# Patient Record
Sex: MALE | Race: WHITE | URBAN - METROPOLITAN AREA
[De-identification: names, ages, dates, MRNs, and addresses within clinical notes are randomized per-mention and may not be internally consistent; named-entity substitution may affect disease eponyms.]

---

## 2023-02-21 DIAGNOSIS — Z12.5 PROSTATE CANCER SCREENING: Primary | ICD-10-CM

## 2023-03-31 ENCOUNTER — OFFICE VISIT (OUTPATIENT)
Dept: UROLOGY | Age: 73
End: 2023-03-31
Payer: MEDICARE

## 2023-03-31 VITALS
BODY MASS INDEX: 39.99 KG/M2 | WEIGHT: 270 LBS | TEMPERATURE: 97.4 F | SYSTOLIC BLOOD PRESSURE: 140 MMHG | DIASTOLIC BLOOD PRESSURE: 94 MMHG | HEIGHT: 69 IN | HEART RATE: 97 BPM

## 2023-03-31 DIAGNOSIS — Z12.5 PROSTATE CANCER SCREENING: ICD-10-CM

## 2023-03-31 DIAGNOSIS — R35.0 URINARY FREQUENCY: ICD-10-CM

## 2023-03-31 DIAGNOSIS — Z12.5 PROSTATE CANCER SCREENING: Primary | ICD-10-CM

## 2023-03-31 DIAGNOSIS — R97.20 ELEVATED PSA: ICD-10-CM

## 2023-03-31 PROCEDURE — 1036F TOBACCO NON-USER: CPT | Performed by: UROLOGY

## 2023-03-31 PROCEDURE — G8417 CALC BMI ABV UP PARAM F/U: HCPCS | Performed by: UROLOGY

## 2023-03-31 PROCEDURE — 1123F ACP DISCUSS/DSCN MKR DOCD: CPT | Performed by: UROLOGY

## 2023-03-31 PROCEDURE — G8427 DOCREV CUR MEDS BY ELIG CLIN: HCPCS | Performed by: UROLOGY

## 2023-03-31 PROCEDURE — 99214 OFFICE O/P EST MOD 30 MIN: CPT | Performed by: UROLOGY

## 2023-03-31 PROCEDURE — G8484 FLU IMMUNIZE NO ADMIN: HCPCS | Performed by: UROLOGY

## 2023-03-31 PROCEDURE — 3017F COLORECTAL CA SCREEN DOC REV: CPT | Performed by: UROLOGY

## 2023-03-31 RX ORDER — INSULIN GLARGINE 100 [IU]/ML
INJECTION, SOLUTION SUBCUTANEOUS NIGHTLY
COMMUNITY

## 2023-03-31 RX ORDER — ALFUZOSIN HYDROCHLORIDE 10 MG/1
10 TABLET, EXTENDED RELEASE ORAL DAILY
Qty: 30 TABLET | Refills: 3 | Status: SHIPPED | OUTPATIENT
Start: 2023-03-31

## 2023-03-31 ASSESSMENT — ENCOUNTER SYMPTOMS
SHORTNESS OF BREATH: 0
NAUSEA: 0
EYE REDNESS: 0
VOMITING: 0
COUGH: 0
BACK PAIN: 0
EYE PAIN: 0
ABDOMINAL PAIN: 0
CONSTIPATION: 0
DIARRHEA: 0
WHEEZING: 0

## 2023-03-31 NOTE — PROGRESS NOTES
Review of Systems   Constitutional:  Negative for chills, fatigue and fever. Eyes:  Negative for pain, redness and visual disturbance. Respiratory:  Negative for cough, shortness of breath and wheezing. Cardiovascular:  Negative for chest pain and leg swelling. Gastrointestinal:  Negative for abdominal pain, constipation, diarrhea, nausea and vomiting. Genitourinary:  Negative for difficulty urinating, dysuria, flank pain, frequency, hematuria, scrotal swelling, testicular pain and urgency. Musculoskeletal:  Negative for back pain, joint swelling and myalgias. Skin:  Negative for rash and wound. Neurological:  Negative for dizziness, tremors, weakness and numbness. Hematological:  Does not bruise/bleed easily.
PRESTAB) 3 MG tablet 3 mg      ONETOUCH VERIO strip USE 1 STRIP TO CHECK GLUCOSE ONCE DAILY      candesartan (ATACAND) 16 MG tablet TAKE 1 TABLET BY MOUTH ONCE DAILY         Ciprofloxacin and Sulfa antibiotics  Social History     Tobacco Use   Smoking Status Never   Smokeless Tobacco Never     (Ifpatient a smoker, smoking cessation counseling offered)    Social History     Substance and Sexual Activity   Alcohol Use None       REVIEW OF SYSTEMS:  Review of Systems    Physical Exam:      Vitals:    03/31/23 1117   BP: (!) 140/94   Pulse: 97   Temp: 97.4 °F (36.3 °C)     Body mass index is 39.87 kg/m². Patient is a 71 y.o. male in no acute distress and alert and oriented to person, place and time. Physical Exam  Constitutional: Patient in no acute distress. Neuro: Alert and oriented to person, place and time. Psych: Mood normal, affect normal  Skin: No rash noted    GUILLE 60 grams, no nodules. Assessment and Plan      1. Prostate cancer screening    2. Urinary frequency    3. Elevated PSA           Plan:         PSA pending. Frequency is likely related to DM. Start uroxatral.   Needs to restrict fluids before bed. PSA elevation is new. He does not want to pursue anything more at this time. Will repeat PSA in 6 months. Return in about 6 months (around 9/30/2023) for Labs. Prescriptions Ordered:  Orders Placed This Encounter   Medications    alfuzosin (UROXATRAL) 10 MG extended release tablet     Sig: Take 1 tablet by mouth daily     Dispense:  30 tablet     Refill:  3       Orders Placed:  No orders of the defined types were placed in this encounter. Stephane Armstrong MD    Agree with the ROS entered by the MA.

## 2023-03-31 NOTE — LETTER
[facility]  [address]  [facility] [address]  Dept: [phone]  Dept Fax: [phone]        3/31/23    Patient: Mojgan Price  YOB: 1950    Dear ALURA DIAS,    I had the pleasure of seeing one of your patients, MOJGAN PRICE today in the office today.  Below are the relevant portions of my assessment and plan of care.    IMPRESSION:  1. Prostate cancer screening    2. Urinary frequency    3. Elevated PSA        PLAN:  PSA pending.   Frequency is likely related to DM.   Needs to restrict fluids before bed.   PSA elevation is new.   He does not want to pursue anything more at this time.   Will repeat PSA in 6 months.   Return in about 6 months (around 9/30/2023) for Labs.    Prescriptions Ordered:  No orders of the defined types were placed in this encounter.    Orders Placed:  No orders of the defined types were placed in this encounter.       Thank you for allowing me to participate in the care of this patient.  I will keep you updated on this patient's follow up and I look forward to serving you and your patients again in the future.        Francisco Javier Palencia MD

## 2023-10-06 ENCOUNTER — OFFICE VISIT (OUTPATIENT)
Dept: UROLOGY | Age: 73
End: 2023-10-06
Payer: MEDICARE

## 2023-10-06 VITALS
SYSTOLIC BLOOD PRESSURE: 138 MMHG | HEIGHT: 69 IN | DIASTOLIC BLOOD PRESSURE: 81 MMHG | HEART RATE: 80 BPM | TEMPERATURE: 96.9 F | BODY MASS INDEX: 39.99 KG/M2 | WEIGHT: 270 LBS

## 2023-10-06 DIAGNOSIS — R35.0 URINARY FREQUENCY: ICD-10-CM

## 2023-10-06 DIAGNOSIS — Z12.5 PROSTATE CANCER SCREENING: Primary | ICD-10-CM

## 2023-10-06 PROCEDURE — G8484 FLU IMMUNIZE NO ADMIN: HCPCS | Performed by: UROLOGY

## 2023-10-06 PROCEDURE — G8427 DOCREV CUR MEDS BY ELIG CLIN: HCPCS | Performed by: UROLOGY

## 2023-10-06 PROCEDURE — G8417 CALC BMI ABV UP PARAM F/U: HCPCS | Performed by: UROLOGY

## 2023-10-06 PROCEDURE — 3017F COLORECTAL CA SCREEN DOC REV: CPT | Performed by: UROLOGY

## 2023-10-06 PROCEDURE — 99214 OFFICE O/P EST MOD 30 MIN: CPT | Performed by: UROLOGY

## 2023-10-06 PROCEDURE — 1036F TOBACCO NON-USER: CPT | Performed by: UROLOGY

## 2023-10-06 PROCEDURE — 1123F ACP DISCUSS/DSCN MKR DOCD: CPT | Performed by: UROLOGY

## 2023-10-06 RX ORDER — ALFUZOSIN HYDROCHLORIDE 10 MG/1
10 TABLET, EXTENDED RELEASE ORAL DAILY
Qty: 90 TABLET | Refills: 3 | Status: SHIPPED | OUTPATIENT
Start: 2023-10-06

## 2023-10-06 ASSESSMENT — ENCOUNTER SYMPTOMS
ABDOMINAL PAIN: 0
EYES NEGATIVE: 1
RESPIRATORY NEGATIVE: 1
WHEEZING: 0
NAUSEA: 0
EYE PAIN: 0
DIARRHEA: 0
CONSTIPATION: 0
GASTROINTESTINAL NEGATIVE: 1
SHORTNESS OF BREATH: 0
EYE REDNESS: 0
BACK PAIN: 0
VOMITING: 0
COUGH: 0

## 2023-10-06 NOTE — PROGRESS NOTES
Review of Systems   Constitutional: Negative. Negative for appetite change, chills and fatigue. Eyes: Negative. Negative for pain, redness and visual disturbance. Respiratory: Negative. Negative for cough, shortness of breath and wheezing. Cardiovascular: Negative. Negative for chest pain and leg swelling. Gastrointestinal: Negative. Negative for abdominal pain, constipation, diarrhea, nausea and vomiting. Genitourinary: Negative. Negative for difficulty urinating, dysuria, flank pain, frequency, hematuria and urgency. Musculoskeletal: Negative. Negative for back pain, joint swelling and myalgias. Skin: Negative. Negative for rash and wound. Neurological: Negative. Negative for dizziness, weakness and numbness. Hematological:  Does not bruise/bleed easily.

## 2023-10-06 NOTE — PROGRESS NOTES
[address],[place] [address] [facility] [address]  Dept: [address] Urology Office Note - Established    Patient:  Jeevan Mccullough  YOB: 1950  Date: 10/6/2023    The patient is a 68 y.o. male who presents todayfor evaluation of the following problems:   Chief Complaint   Patient presents with    6 Month Follow-Up       HPI  He is here in follow up for prostate cancer screening. PSA was 4.73 and is down to 2. He has some urgency   He is diabetic. Hgb A1c is 10. He was on Alfuzosin, but he is not taking it currently. Summary of old records: N/A    Additional History: N/A    Procedures Today: N/A    Urinalysis today:  No results found for this visit on 10/06/23. Last several PSA's:  No results found for: \"PSA\"  Last total testosterone:  No results found for: \"TESTOSTERONE\"    AUA Symptom Score (10/6/2023): Last BUN and creatinine:  No results found for: \"BUN\"  No results found for: \"CREATININE\"    Additional Lab/Culture results: none    Imaging Reviewed during this Office Visit: none  (results were independently reviewed by physician and radiology report verified)    PAST MEDICAL, [place]:  No past medical history on file. No past surgical history on file. No family history on file.   Outpatient Medications Marked as Taking for the 10/6/23 encounter (Office Visit) with Reynold Curtis MD   Medication Sig Dispense Refill    alfuzosin (UROXATRAL) 10 MG extended release tablet Take 1 tablet by mouth daily 90 tablet 3    insulin glargine (LANTUS) 100 UNIT/ML injection vial Inject into the skin nightly      alfuzosin (UROXATRAL) 10 MG extended release tablet Take 1 tablet by mouth daily 30 tablet 3    glyBURIDE micronized (GLYNASE PRESTAB) 3 MG tablet 1 tablet      ONETOUCH VERIO strip USE 1 STRIP TO CHECK GLUCOSE ONCE DAILY      candesartan

## 2024-06-20 ENCOUNTER — APPOINTMENT (OUTPATIENT)
Dept: GENERAL RADIOLOGY | Age: 74
DRG: 064 | End: 2024-06-20
Payer: MEDICARE

## 2024-06-20 ENCOUNTER — APPOINTMENT (OUTPATIENT)
Dept: MRI IMAGING | Age: 74
DRG: 064 | End: 2024-06-20
Payer: MEDICARE

## 2024-06-20 ENCOUNTER — HOSPITAL ENCOUNTER (INPATIENT)
Age: 74
LOS: 20 days | Discharge: NURSING, REHAB OR LONG-TERM CARE | DRG: 064 | End: 2024-07-10
Attending: EMERGENCY MEDICINE | Admitting: PSYCHIATRY & NEUROLOGY
Payer: MEDICARE

## 2024-06-20 ENCOUNTER — APPOINTMENT (OUTPATIENT)
Dept: CT IMAGING | Age: 74
DRG: 064 | End: 2024-06-20
Payer: MEDICARE

## 2024-06-20 DIAGNOSIS — G93.40 ENCEPHALOPATHY: ICD-10-CM

## 2024-06-20 DIAGNOSIS — R57.9 SHOCK (HCC): ICD-10-CM

## 2024-06-20 DIAGNOSIS — J18.9 PNEUMONIA DUE TO INFECTIOUS ORGANISM, UNSPECIFIED LATERALITY, UNSPECIFIED PART OF LUNG: ICD-10-CM

## 2024-06-20 DIAGNOSIS — M62.82 NON-TRAUMATIC RHABDOMYOLYSIS: ICD-10-CM

## 2024-06-20 DIAGNOSIS — R13.10 DYSPHAGIA, UNSPECIFIED TYPE: ICD-10-CM

## 2024-06-20 DIAGNOSIS — I63.512 ACUTE ISCHEMIC LEFT MCA STROKE (HCC): Primary | ICD-10-CM

## 2024-06-20 PROBLEM — I63.9 ISCHEMIC STROKE (HCC): Status: ACTIVE | Noted: 2024-06-20

## 2024-06-20 LAB
ALBUMIN SERPL-MCNC: 3.8 G/DL (ref 3.5–5.2)
ALBUMIN/GLOB SERPL: 1 {RATIO} (ref 1–2.5)
ALLEN TEST: POSITIVE
ALP SERPL-CCNC: 69 U/L (ref 40–129)
ALT SERPL-CCNC: 80 U/L (ref 10–50)
ANION GAP SERPL CALCULATED.3IONS-SCNC: 13 MMOL/L (ref 9–16)
APAP SERPL-MCNC: <5 UG/ML (ref 10–30)
AST SERPL-CCNC: 444 U/L (ref 10–50)
B-OH-BUTYR SERPL-MCNC: 0.65 MMOL/L (ref 0.02–0.27)
BACTERIA URNS QL MICRO: ABNORMAL
BASOPHILS # BLD: 0 K/UL (ref 0–0.2)
BASOPHILS NFR BLD: 0 % (ref 0–2)
BILIRUB SERPL-MCNC: 1.3 MG/DL (ref 0–1.2)
BILIRUB UR QL STRIP: ABNORMAL
BODY TEMPERATURE: 37
BUN BLD-MCNC: 30 MG/DL (ref 8–26)
BUN SERPL-MCNC: 27 MG/DL (ref 8–23)
CA-I BLD-SCNC: 1.14 MMOL/L (ref 1.13–1.33)
CA-I BLD-SCNC: 1.15 MMOL/L (ref 1.15–1.33)
CALCIUM SERPL-MCNC: 8.5 MG/DL (ref 8.6–10.4)
CASTS #/AREA URNS LPF: ABNORMAL /LPF (ref 0–8)
CHLORIDE BLD-SCNC: 101 MMOL/L (ref 98–107)
CHLORIDE SERPL-SCNC: 97 MMOL/L (ref 98–107)
CK SERPL-CCNC: ABNORMAL U/L (ref 39–308)
CLARITY UR: ABNORMAL
CO2 BLD CALC-SCNC: 23 MMOL/L (ref 22–30)
CO2 SERPL-SCNC: 21 MMOL/L (ref 20–31)
COHGB MFR BLD: 3 % (ref 0–5)
COLOR UR: ABNORMAL
CREAT SERPL-MCNC: 1.6 MG/DL (ref 0.7–1.2)
EGFR, POC: 53 ML/MIN/1.73M2
EOSINOPHIL # BLD: 0 K/UL (ref 0–0.44)
EOSINOPHILS RELATIVE PERCENT: 0 % (ref 1–4)
EPI CELLS #/AREA URNS HPF: ABNORMAL /HPF (ref 0–5)
ERYTHROCYTE [DISTWIDTH] IN BLOOD BY AUTOMATED COUNT: 14.5 % (ref 11.8–14.4)
ETHANOL PERCENT: <0.01 %
ETHANOLAMINE SERPL-MCNC: <10 MG/DL (ref 0–0.08)
FIO2 ON VENT: ABNORMAL %
FIO2: 100
GFR, ESTIMATED: 45 ML/MIN/1.73M2
GLUCOSE BLD-MCNC: 283 MG/DL (ref 75–110)
GLUCOSE BLD-MCNC: 366 MG/DL (ref 74–100)
GLUCOSE BLD-MCNC: 421 MG/DL (ref 74–100)
GLUCOSE SERPL-MCNC: 389 MG/DL (ref 74–99)
GLUCOSE UR STRIP-MCNC: ABNORMAL MG/DL
HCO3 VENOUS: 21.2 MMOL/L (ref 24–30)
HCO3 VENOUS: 22.9 MMOL/L (ref 22–29)
HCT VFR BLD AUTO: 46.7 % (ref 40.7–50.3)
HCT VFR BLD AUTO: 50 % (ref 41–53)
HGB BLD-MCNC: 15.1 G/DL (ref 13–17)
HGB UR QL STRIP.AUTO: ABNORMAL
IMM GRANULOCYTES # BLD AUTO: 0.21 K/UL (ref 0–0.3)
IMM GRANULOCYTES NFR BLD: 1 %
INR PPP: 1.3
KETONES UR STRIP-MCNC: ABNORMAL MG/DL
LACTIC ACID, SEPSIS WHOLE BLOOD: 3.6 MMOL/L (ref 0.5–1.9)
LACTIC ACID, SEPSIS WHOLE BLOOD: 4.7 MMOL/L (ref 0.5–1.9)
LEUKOCYTE ESTERASE UR QL STRIP: ABNORMAL
LYMPHOCYTES NFR BLD: 1.44 K/UL (ref 1.1–3.7)
LYMPHOCYTES RELATIVE PERCENT: 7 % (ref 24–43)
MAGNESIUM SERPL-MCNC: 2.2 MG/DL (ref 1.6–2.4)
MCH RBC QN AUTO: 30 PG (ref 25.2–33.5)
MCHC RBC AUTO-ENTMCNC: 32.3 G/DL (ref 28.4–34.8)
MCV RBC AUTO: 92.7 FL (ref 82.6–102.9)
MODE: ABNORMAL
MONOCYTES NFR BLD: 1.85 K/UL (ref 0.1–1.2)
MONOCYTES NFR BLD: 9 % (ref 3–12)
MORPHOLOGY: ABNORMAL
NEGATIVE BASE EXCESS, ART: 6.3 MMOL/L (ref 0–2)
NEGATIVE BASE EXCESS, VEN: 4.7 MMOL/L (ref 0–2)
NEGATIVE BASE EXCESS, VEN: 6.7 MMOL/L (ref 0–2)
NEUTROPHILS NFR BLD: 83 % (ref 36–65)
NEUTS SEG NFR BLD: 17.1 K/UL (ref 1.5–8.1)
NITRITE UR QL STRIP: NEGATIVE
NRBC BLD-RTO: 0 PER 100 WBC
O2 DELIVERY DEVICE: ABNORMAL
O2 SAT, VEN: 34.7 % (ref 60–85)
O2 SAT, VEN: 65.7 % (ref 60–85)
PARTIAL THROMBOPLASTIN TIME: 24.2 SEC (ref 23–36.5)
PCO2 VENOUS: 50.1 MM HG (ref 41–51)
PCO2 VENOUS: 52.4 MM HG (ref 39–55)
PH UR STRIP: 5.5 [PH] (ref 5–8)
PH VENOUS: 7.23 (ref 7.32–7.42)
PH VENOUS: 7.27 (ref 7.32–7.43)
PLATELET # BLD AUTO: 238 K/UL (ref 138–453)
PMV BLD AUTO: 12.4 FL (ref 8.1–13.5)
PO2 VENOUS: 24.2 MM HG (ref 30–50)
PO2 VENOUS: 42.2 MM HG (ref 30–50)
POC ANION GAP: 12 MMOL/L (ref 7–16)
POC CREATININE: 1.4 MG/DL (ref 0.51–1.19)
POC HCO3: 19.4 MMOL/L (ref 21–28)
POC HEMOGLOBIN (CALC): 16.9 G/DL (ref 13.5–17.5)
POC LACTIC ACID: 2 MMOL/L (ref 0.56–1.39)
POC LACTIC ACID: 4.5 MMOL/L (ref 0.56–1.39)
POC O2 SATURATION: 99.7 % (ref 94–98)
POC PCO2: 38.2 MM HG (ref 35–48)
POC PH: 7.31 (ref 7.35–7.45)
POC PO2: 223.3 MM HG (ref 83–108)
POTASSIUM BLD-SCNC: 5 MMOL/L (ref 3.5–4.5)
POTASSIUM SERPL-SCNC: 4.8 MMOL/L (ref 3.7–5.3)
PROT SERPL-MCNC: 7.6 G/DL (ref 6.6–8.7)
PROT UR STRIP-MCNC: ABNORMAL MG/DL
PROTHROMBIN TIME: 16.3 SEC (ref 11.7–14.9)
RBC # BLD AUTO: 5.04 M/UL (ref 4.21–5.77)
RBC #/AREA URNS HPF: ABNORMAL /HPF (ref 0–4)
SALICYLATES SERPL-MCNC: <0.5 MG/DL (ref 0–10)
SAMPLE SITE: ABNORMAL
SODIUM BLD-SCNC: 135 MMOL/L (ref 138–146)
SODIUM SERPL-SCNC: 131 MMOL/L (ref 136–145)
SP GR UR STRIP: 1.03 (ref 1–1.03)
TROPONIN I SERPL HS-MCNC: 72 NG/L (ref 0–22)
TROPONIN I SERPL HS-MCNC: 79 NG/L (ref 0–22)
UROBILINOGEN UR STRIP-ACNC: NORMAL EU/DL (ref 0–1)
WBC #/AREA URNS HPF: ABNORMAL /HPF (ref 0–5)
WBC OTHER # BLD: 20.6 K/UL (ref 3.5–11.3)

## 2024-06-20 PROCEDURE — 80053 COMPREHEN METABOLIC PANEL: CPT

## 2024-06-20 PROCEDURE — 36415 COLL VENOUS BLD VENIPUNCTURE: CPT

## 2024-06-20 PROCEDURE — 70551 MRI BRAIN STEM W/O DYE: CPT

## 2024-06-20 PROCEDURE — 99447 NTRPROF PH1/NTRNET/EHR 11-20: CPT | Performed by: PSYCHIATRY & NEUROLOGY

## 2024-06-20 PROCEDURE — 93005 ELECTROCARDIOGRAM TRACING: CPT | Performed by: REGISTERED NURSE

## 2024-06-20 PROCEDURE — 94761 N-INVAS EAR/PLS OXIMETRY MLT: CPT

## 2024-06-20 PROCEDURE — 0042T CT BRAIN PERFUSION: CPT

## 2024-06-20 PROCEDURE — 85025 COMPLETE CBC W/AUTO DIFF WBC: CPT

## 2024-06-20 PROCEDURE — 84484 ASSAY OF TROPONIN QUANT: CPT

## 2024-06-20 PROCEDURE — 83735 ASSAY OF MAGNESIUM: CPT

## 2024-06-20 PROCEDURE — 70450 CT HEAD/BRAIN W/O DYE: CPT

## 2024-06-20 PROCEDURE — 2500000003 HC RX 250 WO HCPCS: Performed by: STUDENT IN AN ORGANIZED HEALTH CARE EDUCATION/TRAINING PROGRAM

## 2024-06-20 PROCEDURE — 80051 ELECTROLYTE PANEL: CPT

## 2024-06-20 PROCEDURE — 71045 X-RAY EXAM CHEST 1 VIEW: CPT

## 2024-06-20 PROCEDURE — G0480 DRUG TEST DEF 1-7 CLASSES: HCPCS

## 2024-06-20 PROCEDURE — 87040 BLOOD CULTURE FOR BACTERIA: CPT

## 2024-06-20 PROCEDURE — 6370000000 HC RX 637 (ALT 250 FOR IP): Performed by: REGISTERED NURSE

## 2024-06-20 PROCEDURE — 70498 CT ANGIOGRAPHY NECK: CPT

## 2024-06-20 PROCEDURE — 87086 URINE CULTURE/COLONY COUNT: CPT

## 2024-06-20 PROCEDURE — 6360000002 HC RX W HCPCS: Performed by: STUDENT IN AN ORGANIZED HEALTH CARE EDUCATION/TRAINING PROGRAM

## 2024-06-20 PROCEDURE — 82803 BLOOD GASES ANY COMBINATION: CPT

## 2024-06-20 PROCEDURE — 2000000000 HC ICU R&B

## 2024-06-20 PROCEDURE — 99285 EMERGENCY DEPT VISIT HI MDM: CPT

## 2024-06-20 PROCEDURE — 2580000003 HC RX 258: Performed by: STUDENT IN AN ORGANIZED HEALTH CARE EDUCATION/TRAINING PROGRAM

## 2024-06-20 PROCEDURE — 36600 WITHDRAWAL OF ARTERIAL BLOOD: CPT

## 2024-06-20 PROCEDURE — 2500000003 HC RX 250 WO HCPCS: Performed by: REGISTERED NURSE

## 2024-06-20 PROCEDURE — 87205 SMEAR GRAM STAIN: CPT

## 2024-06-20 PROCEDURE — 94002 VENT MGMT INPAT INIT DAY: CPT

## 2024-06-20 PROCEDURE — 6370000000 HC RX 637 (ALT 250 FOR IP): Performed by: STUDENT IN AN ORGANIZED HEALTH CARE EDUCATION/TRAINING PROGRAM

## 2024-06-20 PROCEDURE — 80143 DRUG ASSAY ACETAMINOPHEN: CPT

## 2024-06-20 PROCEDURE — 2580000003 HC RX 258: Performed by: REGISTERED NURSE

## 2024-06-20 PROCEDURE — 82330 ASSAY OF CALCIUM: CPT

## 2024-06-20 PROCEDURE — 72125 CT NECK SPINE W/O DYE: CPT

## 2024-06-20 PROCEDURE — 85014 HEMATOCRIT: CPT

## 2024-06-20 PROCEDURE — 6360000004 HC RX CONTRAST MEDICATION: Performed by: STUDENT IN AN ORGANIZED HEALTH CARE EDUCATION/TRAINING PROGRAM

## 2024-06-20 PROCEDURE — 80179 DRUG ASSAY SALICYLATE: CPT

## 2024-06-20 PROCEDURE — 85610 PROTHROMBIN TIME: CPT

## 2024-06-20 PROCEDURE — 85730 THROMBOPLASTIN TIME PARTIAL: CPT

## 2024-06-20 PROCEDURE — 82805 BLOOD GASES W/O2 SATURATION: CPT

## 2024-06-20 PROCEDURE — 82550 ASSAY OF CK (CPK): CPT

## 2024-06-20 PROCEDURE — 82947 ASSAY GLUCOSE BLOOD QUANT: CPT

## 2024-06-20 PROCEDURE — 74177 CT ABD & PELVIS W/CONTRAST: CPT

## 2024-06-20 PROCEDURE — 82565 ASSAY OF CREATININE: CPT

## 2024-06-20 PROCEDURE — 83605 ASSAY OF LACTIC ACID: CPT

## 2024-06-20 PROCEDURE — 96375 TX/PRO/DX INJ NEW DRUG ADDON: CPT

## 2024-06-20 PROCEDURE — 84520 ASSAY OF UREA NITROGEN: CPT

## 2024-06-20 PROCEDURE — 87154 CUL TYP ID BLD PTHGN 6+ TRGT: CPT

## 2024-06-20 PROCEDURE — 82010 KETONE BODYS QUAN: CPT

## 2024-06-20 PROCEDURE — 6360000002 HC RX W HCPCS: Performed by: REGISTERED NURSE

## 2024-06-20 PROCEDURE — 2700000000 HC OXYGEN THERAPY PER DAY

## 2024-06-20 PROCEDURE — 5A1955Z RESPIRATORY VENTILATION, GREATER THAN 96 CONSECUTIVE HOURS: ICD-10-PCS | Performed by: STUDENT IN AN ORGANIZED HEALTH CARE EDUCATION/TRAINING PROGRAM

## 2024-06-20 PROCEDURE — 96365 THER/PROPH/DIAG IV INF INIT: CPT

## 2024-06-20 PROCEDURE — 81001 URINALYSIS AUTO W/SCOPE: CPT

## 2024-06-20 RX ORDER — INSULIN LISPRO 100 [IU]/ML
0-4 INJECTION, SOLUTION INTRAVENOUS; SUBCUTANEOUS NIGHTLY
Status: DISCONTINUED | OUTPATIENT
Start: 2024-06-20 | End: 2024-06-21

## 2024-06-20 RX ORDER — SODIUM CHLORIDE 0.9 % (FLUSH) 0.9 %
5-40 SYRINGE (ML) INJECTION EVERY 12 HOURS SCHEDULED
Status: DISCONTINUED | OUTPATIENT
Start: 2024-06-20 | End: 2024-07-10 | Stop reason: HOSPADM

## 2024-06-20 RX ORDER — ASPIRIN 325 MG
325 TABLET ORAL ONCE
Status: COMPLETED | OUTPATIENT
Start: 2024-06-20 | End: 2024-06-20

## 2024-06-20 RX ORDER — PHENYLEPHRINE HCL IN 0.9% NACL 50MG/250ML
10-300 PLASTIC BAG, INJECTION (ML) INTRAVENOUS CONTINUOUS
Status: DISCONTINUED | OUTPATIENT
Start: 2024-06-20 | End: 2024-06-21

## 2024-06-20 RX ORDER — ATORVASTATIN CALCIUM 80 MG/1
80 TABLET, FILM COATED ORAL NIGHTLY
Status: DISCONTINUED | OUTPATIENT
Start: 2024-06-20 | End: 2024-06-23

## 2024-06-20 RX ORDER — INSULIN LISPRO 100 [IU]/ML
0-8 INJECTION, SOLUTION INTRAVENOUS; SUBCUTANEOUS
Status: DISCONTINUED | OUTPATIENT
Start: 2024-06-21 | End: 2024-06-21

## 2024-06-20 RX ORDER — SODIUM CHLORIDE 9 MG/ML
INJECTION, SOLUTION INTRAVENOUS CONTINUOUS
Status: DISCONTINUED | OUTPATIENT
Start: 2024-06-20 | End: 2024-07-01

## 2024-06-20 RX ORDER — FENTANYL CITRATE-0.9 % NACL/PF 10 MCG/ML
25-200 PLASTIC BAG, INJECTION (ML) INTRAVENOUS CONTINUOUS
Status: DISCONTINUED | OUTPATIENT
Start: 2024-06-20 | End: 2024-06-20

## 2024-06-20 RX ORDER — ONDANSETRON 4 MG/1
4 TABLET, ORALLY DISINTEGRATING ORAL EVERY 8 HOURS PRN
Status: DISCONTINUED | OUTPATIENT
Start: 2024-06-20 | End: 2024-07-10 | Stop reason: HOSPADM

## 2024-06-20 RX ORDER — LABETALOL HYDROCHLORIDE 5 MG/ML
10 INJECTION, SOLUTION INTRAVENOUS EVERY 10 MIN PRN
Status: DISCONTINUED | OUTPATIENT
Start: 2024-06-20 | End: 2024-06-24

## 2024-06-20 RX ORDER — SODIUM CHLORIDE 9 MG/ML
INJECTION, SOLUTION INTRAVENOUS PRN
Status: DISCONTINUED | OUTPATIENT
Start: 2024-06-20 | End: 2024-07-06

## 2024-06-20 RX ORDER — FENTANYL CITRATE-0.9 % NACL/PF 20 MCG/2ML
50 SYRINGE (ML) INTRAVENOUS EVERY 30 MIN PRN
Status: DISCONTINUED | OUTPATIENT
Start: 2024-06-20 | End: 2024-06-21

## 2024-06-20 RX ORDER — SODIUM CHLORIDE 0.9 % (FLUSH) 0.9 %
5-40 SYRINGE (ML) INJECTION PRN
Status: DISCONTINUED | OUTPATIENT
Start: 2024-06-20 | End: 2024-07-10 | Stop reason: HOSPADM

## 2024-06-20 RX ORDER — ASPIRIN 300 MG/1
300 SUPPOSITORY RECTAL DAILY
Status: DISCONTINUED | OUTPATIENT
Start: 2024-06-21 | End: 2024-06-21

## 2024-06-20 RX ORDER — POLYETHYLENE GLYCOL 3350 17 G/17G
17 POWDER, FOR SOLUTION ORAL DAILY PRN
Status: DISCONTINUED | OUTPATIENT
Start: 2024-06-20 | End: 2024-06-21

## 2024-06-20 RX ORDER — ASPIRIN 81 MG/1
81 TABLET, CHEWABLE ORAL DAILY
Status: DISCONTINUED | OUTPATIENT
Start: 2024-06-21 | End: 2024-06-21

## 2024-06-20 RX ORDER — 0.9 % SODIUM CHLORIDE 0.9 %
30 INTRAVENOUS SOLUTION INTRAVENOUS ONCE
Status: COMPLETED | OUTPATIENT
Start: 2024-06-20 | End: 2024-06-20

## 2024-06-20 RX ORDER — ONDANSETRON 2 MG/ML
4 INJECTION INTRAMUSCULAR; INTRAVENOUS EVERY 6 HOURS PRN
Status: DISCONTINUED | OUTPATIENT
Start: 2024-06-20 | End: 2024-07-10 | Stop reason: HOSPADM

## 2024-06-20 RX ORDER — CHLORHEXIDINE GLUCONATE ORAL RINSE 1.2 MG/ML
15 SOLUTION DENTAL 2 TIMES DAILY
Status: DISCONTINUED | OUTPATIENT
Start: 2024-06-20 | End: 2024-07-01

## 2024-06-20 RX ORDER — PROPOFOL 10 MG/ML
5-50 INJECTION, EMULSION INTRAVENOUS CONTINUOUS
Status: DISCONTINUED | OUTPATIENT
Start: 2024-06-20 | End: 2024-06-22

## 2024-06-20 RX ADMIN — VANCOMYCIN HYDROCHLORIDE 1750 MG: 10 INJECTION, POWDER, LYOPHILIZED, FOR SOLUTION INTRAVENOUS at 16:38

## 2024-06-20 RX ADMIN — IOPAMIDOL 200 ML: 755 INJECTION, SOLUTION INTRAVENOUS at 14:43

## 2024-06-20 RX ADMIN — SODIUM CHLORIDE: 9 INJECTION, SOLUTION INTRAVENOUS at 16:08

## 2024-06-20 RX ADMIN — CHLORHEXIDINE GLUCONATE 0.12% ORAL RINSE 15 ML: 1.2 LIQUID ORAL at 22:03

## 2024-06-20 RX ADMIN — Medication 50 MCG/HR: at 21:59

## 2024-06-20 RX ADMIN — ASPIRIN 325 MG: 325 TABLET ORAL at 22:48

## 2024-06-20 RX ADMIN — FAMOTIDINE 20 MG: 10 INJECTION, SOLUTION INTRAVENOUS at 22:03

## 2024-06-20 RX ADMIN — PHENYLEPHRINE HYDROCHLORIDE 10 MCG/MIN: 10 INJECTION INTRAVENOUS at 22:17

## 2024-06-20 RX ADMIN — PROPOFOL 15 MCG/KG/MIN: 10 INJECTION, EMULSION INTRAVENOUS at 20:47

## 2024-06-20 RX ADMIN — SODIUM CHLORIDE 2121 ML: 9 INJECTION, SOLUTION INTRAVENOUS at 14:49

## 2024-06-20 RX ADMIN — SODIUM CHLORIDE: 9 INJECTION, SOLUTION INTRAVENOUS at 21:32

## 2024-06-20 RX ADMIN — PIPERACILLIN AND TAZOBACTAM 3375 MG: 3; .375 INJECTION, POWDER, LYOPHILIZED, FOR SOLUTION INTRAVENOUS at 15:43

## 2024-06-20 RX ADMIN — ATORVASTATIN CALCIUM 80 MG: 80 TABLET, FILM COATED ORAL at 22:03

## 2024-06-20 RX ADMIN — SODIUM CHLORIDE, PRESERVATIVE FREE 10 ML: 5 INJECTION INTRAVENOUS at 22:07

## 2024-06-20 RX ADMIN — PROPOFOL 20 MCG/KG/MIN: 10 INJECTION, EMULSION INTRAVENOUS at 15:25

## 2024-06-20 ASSESSMENT — PULMONARY FUNCTION TESTS
PIF_VALUE: 24
PIF_VALUE: 17
PIF_VALUE: 14

## 2024-06-20 NOTE — ED NOTES
Attempted to give report to Neuro ICU and was told by Aydee that there was no RN to take the patient and it would have to be night shift that takes report.

## 2024-06-20 NOTE — ED NOTES
Pt presents to ED as a transfer from [facility] for difficulty breathing.  Per EMS, pt's neighbor was concerned because he did not see the pt so she called EMS to check on pt.  EMS found pt in bed with snoring respirations and unresponsive.  Pt was intubated on scene and brought to [facility].  [facility] completed a CT scan that showed pt has left infarct. Pt also found to have rhabdomyolysis and lactic acidosis.  Upon arrival pt in a-fib.  Pt received 2 doses of fentanyl an unknown dose of Ketamine, and was started on a levo drip due to hypotension.

## 2024-06-20 NOTE — ED NOTES
Labeled blood specimens sent to lab via tube system.    [x] Lavender   [] on ice   [x] Blue   [x] Green/yellow  [x] Green/black [] on ice  [] Pink  [] Red  [] Yellow  [] on ice  [x] Blood Cultures  [] x1 [x] x2    Labeled urine specimen sent to lab via tube system.    Urine Sample  []  Clean catch  []  Straight cath  []  Urine voided  [x]  Indwelling catheter  []  Suprapubic catheter

## 2024-06-20 NOTE — ED PROVIDER NOTES
[facility] ED     Emergency Department     Faculty Attestation        I performed a history and physical examination of the patient and discussed management with the resident. I reviewed the resident’s note and agree with the documented findings and plan of care. Any areas of disagreement are noted on the chart. I was personally present for the key portions of any procedures. I have documented in the chart those procedures where I was not present during the key portions. I have reviewed the emergency nurses triage note. I agree with the chief complaint, past medical history, past surgical history, allergies, medications, social and family history as documented unless otherwise noted below.  For Physician Assistant/ Nurse Practitioner cases/documentation I have personally evaluated this patient and have completed at least one if not all key elements of the E/M (history, physical exam, and MDM). Additional findings are as noted.        PCP:  Kevyn Castillo MD  Note Started: 6/20/24, 1:55 PM EDT    Pertinent Comments:     Patient is a 73-year-old male transferred from [facility] with last seen normal at least 48 hours ago found on his floor with what appears to be rhabdomyolysis as well as not protecting his airway and altered requiring intubation prior to arrival.   CT head without contrast already shows an infarct in the left MCA territory.    Neuroendovascular was already involved and wished CT perfusion here.    Patient is intermittently in A-fib RVR as well.    Was requiring some Levophed just prior to arrival by [facility].    Awaiting stroke alert as well as MICU admission      CRITICAL CARE: There was a high probability of clinically significant/life threatening deterioration in this patient's condition which required my urgent intervention.  Total critical care time was 30 minutes.  This excludes any time for separately reportable procedures. 
        [facility] ED  Emergency Department  Emergency Medicine Resident Turn-Over     Note Started: 3:08 PM EDT    Care of Timothy Arellano was assumed from Dr. Richardson and is being seen for Respiratory Distress  .  The patient's initial evaluation and plan have been discussed with the prior provider who initially evaluated the patient.     EMERGENCY DEPARTMENT COURSE / MEDICAL DECISION MAKING:       MEDICATIONS GIVEN:  Orders Placed This Encounter   Medications    iopamidol (ISOVUE-370) 76 % injection 200 mL    sodium chloride 0.9 % bolus 2,121 mL    propofol infusion     Order Specific Question:   Titrate Infusion?     Answer:   Yes     Order Specific Question:   Initial Infusion Dose:     Answer:   20 mcg/kg/min     Order Specific Question:   Goal of Therapy:     Answer:   RASS of 0 to -1     Order Specific Question:   Contact Provider if:     Answer:   New onset HR less than 50 bpm     Order Specific Question:   Contact Provider if:     Answer:   New onset SBP less than 90 mmHg     Order Specific Question:   Contact Provider if:     Answer:   Patient is receiving maximum dose and is not achieving the goal of therapy     Order Specific Question:   Contact Provider if:     Answer:   Triglycerides greater than 500 mg/dL    vancomycin (VANCOCIN) 1750 mg in sodium chloride 0.9 % 500 mL IVPB     Order Specific Question:   Antimicrobial Indications     Answer:   Sepsis of Unknown Etiology     Order Specific Question:   Sepsis duration of therapy     Answer:   7 days    piperacillin-tazobactam (ZOSYN) 3,375 mg in sodium chloride 0.9 % 50 mL IVPB (mini-bag)     Order Specific Question:   Antimicrobial Indications     Answer:   Sepsis of Unknown Etiology     Order Specific Question:   Sepsis duration of therapy     Answer:   7 days    0.9 % sodium chloride infusion    sodium chloride flush 0.9 % injection 5-40 mL    sodium chloride flush 0.9 % injection 5-40 mL    0.9 % sodium chloride infusion    OR Linked 
[facility]  FACULTY HANDOFF       Handoff taken on the following patient from prior Attending Physician:  Pt Name: Enricocurtis Banksy  PCP:  Kevyn Castillo MD    Attestation  I was available and discussed any additional care issues that arose and coordinated the management plans with the resident(s) caring for the patient during my duty period. Any areas of disagreement with resident's documentation of care or procedures are noted on the chart. I was personally present for the key portions of any/all procedures during my duty period. I have documented in the chart those procedures where I was not present during the key portions.           Adam Wise MD  06/20/24 9881    
were made to edit the dictations but occasionally words are mis-transcribed.)

## 2024-06-21 ENCOUNTER — APPOINTMENT (OUTPATIENT)
Dept: GENERAL RADIOLOGY | Age: 74
DRG: 064 | End: 2024-06-21
Payer: MEDICARE

## 2024-06-21 PROBLEM — M62.82 NON-TRAUMATIC RHABDOMYOLYSIS: Status: ACTIVE | Noted: 2024-06-21

## 2024-06-21 PROBLEM — J18.9 PNEUMONIA DUE TO INFECTIOUS ORGANISM: Status: ACTIVE | Noted: 2024-06-21

## 2024-06-21 PROBLEM — I63.512 ACUTE ISCHEMIC LEFT MCA STROKE (HCC): Status: ACTIVE | Noted: 2024-06-20

## 2024-06-21 PROBLEM — N17.9 AKI (ACUTE KIDNEY INJURY) (HCC): Status: ACTIVE | Noted: 2024-06-21

## 2024-06-21 PROBLEM — R78.81 BACTEREMIA: Status: ACTIVE | Noted: 2024-06-21

## 2024-06-21 PROBLEM — R57.9 SHOCK (HCC): Status: ACTIVE | Noted: 2024-06-21

## 2024-06-21 LAB
ALLEN TEST: ABNORMAL
AMMONIA PLAS-SCNC: 39 UMOL/L (ref 16–60)
ANION GAP SERPL CALCULATED.3IONS-SCNC: 10 MMOL/L (ref 9–16)
ANION GAP SERPL CALCULATED.3IONS-SCNC: 11 MMOL/L (ref 9–16)
ANION GAP SERPL CALCULATED.3IONS-SCNC: 9 MMOL/L (ref 9–16)
ANTI-XA UNFRAC HEPARIN: 0.1 IU/L
ANTI-XA UNFRAC HEPARIN: 0.15 IU/L
ANTI-XA UNFRAC HEPARIN: <0.1 IU/L
B PARAP IS1001 DNA NPH QL NAA+NON-PROBE: NOT DETECTED
B PERT DNA SPEC QL NAA+PROBE: NOT DETECTED
BNP SERPL-MCNC: 7912 PG/ML (ref 0–300)
BUN SERPL-MCNC: 26 MG/DL (ref 8–23)
C PNEUM DNA NPH QL NAA+NON-PROBE: NOT DETECTED
CALCIUM SERPL-MCNC: 7.6 MG/DL (ref 8.6–10.4)
CHLORIDE SERPL-SCNC: 107 MMOL/L (ref 98–107)
CHLORIDE SERPL-SCNC: 109 MMOL/L (ref 98–107)
CHLORIDE SERPL-SCNC: 110 MMOL/L (ref 98–107)
CHLORIDE SERPL-SCNC: 111 MMOL/L (ref 98–107)
CHLORIDE SERPL-SCNC: 111 MMOL/L (ref 98–107)
CHOLEST SERPL-MCNC: 126 MG/DL (ref 0–199)
CHOLESTEROL/HDL RATIO: 4
CK SERPL-CCNC: ABNORMAL U/L (ref 39–308)
CO2 SERPL-SCNC: 18 MMOL/L (ref 20–31)
CO2 SERPL-SCNC: 19 MMOL/L (ref 20–31)
CO2 SERPL-SCNC: 20 MMOL/L (ref 20–31)
CREAT SERPL-MCNC: 1.3 MG/DL (ref 0.7–1.2)
EKG ATRIAL RATE: 120 BPM
EKG Q-T INTERVAL: 268 MS
EKG QRS DURATION: 102 MS
EKG QTC CALCULATION (BAZETT): 383 MS
EKG R AXIS: 16 DEGREES
EKG T AXIS: 30 DEGREES
EKG VENTRICULAR RATE: 123 BPM
ERYTHROCYTE [DISTWIDTH] IN BLOOD BY AUTOMATED COUNT: 14.8 % (ref 11.8–14.4)
ERYTHROCYTE [DISTWIDTH] IN BLOOD BY AUTOMATED COUNT: 15 % (ref 11.8–14.4)
EST. AVERAGE GLUCOSE BLD GHB EST-MCNC: 226 MG/DL
FIO2: 50
FLUAV RNA NPH QL NAA+NON-PROBE: NOT DETECTED
FLUBV RNA NPH QL NAA+NON-PROBE: NOT DETECTED
GFR, ESTIMATED: 58 ML/MIN/1.73M2
GLUCOSE BLD-MCNC: 192 MG/DL (ref 74–100)
GLUCOSE BLD-MCNC: 203 MG/DL (ref 75–110)
GLUCOSE BLD-MCNC: 207 MG/DL (ref 75–110)
GLUCOSE SERPL-MCNC: 178 MG/DL (ref 74–99)
HADV DNA NPH QL NAA+NON-PROBE: NOT DETECTED
HBA1C MFR BLD: 9.5 % (ref 4–6)
HCOV 229E RNA NPH QL NAA+NON-PROBE: NOT DETECTED
HCOV HKU1 RNA NPH QL NAA+NON-PROBE: NOT DETECTED
HCOV NL63 RNA NPH QL NAA+NON-PROBE: NOT DETECTED
HCOV OC43 RNA NPH QL NAA+NON-PROBE: NOT DETECTED
HCT VFR BLD AUTO: 39.5 % (ref 40.7–50.3)
HCT VFR BLD AUTO: 40.4 % (ref 40.7–50.3)
HDLC SERPL-MCNC: 36 MG/DL
HGB BLD-MCNC: 12.9 G/DL (ref 13–17)
HGB BLD-MCNC: 13.3 G/DL (ref 13–17)
HMPV RNA NPH QL NAA+NON-PROBE: NOT DETECTED
HPIV1 RNA NPH QL NAA+NON-PROBE: NOT DETECTED
HPIV2 RNA NPH QL NAA+NON-PROBE: NOT DETECTED
HPIV3 RNA NPH QL NAA+NON-PROBE: NOT DETECTED
HPIV4 RNA NPH QL NAA+NON-PROBE: NOT DETECTED
INR PPP: 1.4
LACTIC ACID, WHOLE BLOOD: 1.6 MMOL/L (ref 0.7–2.1)
LDLC SERPL CALC-MCNC: 66 MG/DL (ref 0–100)
M PNEUMO DNA NPH QL NAA+NON-PROBE: NOT DETECTED
MCH RBC QN AUTO: 30.6 PG (ref 25.2–33.5)
MCH RBC QN AUTO: 30.7 PG (ref 25.2–33.5)
MCHC RBC AUTO-ENTMCNC: 32.7 G/DL (ref 28.4–34.8)
MCHC RBC AUTO-ENTMCNC: 32.9 G/DL (ref 28.4–34.8)
MCV RBC AUTO: 93.3 FL (ref 82.6–102.9)
MCV RBC AUTO: 93.8 FL (ref 82.6–102.9)
MICROORGANISM SPEC CULT: NO GROWTH
MODE: ABNORMAL
NEGATIVE BASE EXCESS, ART: 4.2 MMOL/L (ref 0–2)
NRBC BLD-RTO: 0 PER 100 WBC
NRBC BLD-RTO: 0 PER 100 WBC
O2 DELIVERY DEVICE: ABNORMAL
PARTIAL THROMBOPLASTIN TIME: 24.3 SEC (ref 23–36.5)
PLATELET # BLD AUTO: 202 K/UL (ref 138–453)
PLATELET # BLD AUTO: 212 K/UL (ref 138–453)
PMV BLD AUTO: 11.9 FL (ref 8.1–13.5)
PMV BLD AUTO: 11.9 FL (ref 8.1–13.5)
POC HCO3: 22.2 MMOL/L (ref 21–28)
POC LACTIC ACID: 1.4 MMOL/L (ref 0.56–1.39)
POC O2 SATURATION: 97.5 % (ref 94–98)
POC PCO2: 44.8 MM HG (ref 35–48)
POC PH: 7.3 (ref 7.35–7.45)
POC PO2: 106.9 MM HG (ref 83–108)
POTASSIUM SERPL-SCNC: 4.1 MMOL/L (ref 3.7–5.3)
POTASSIUM SERPL-SCNC: 4.2 MMOL/L (ref 3.7–5.3)
POTASSIUM SERPL-SCNC: 4.3 MMOL/L (ref 3.7–5.3)
POTASSIUM SERPL-SCNC: 4.4 MMOL/L (ref 3.7–5.3)
POTASSIUM SERPL-SCNC: 4.4 MMOL/L (ref 3.7–5.3)
PROCALCITONIN SERPL-MCNC: 0.55 NG/ML (ref 0–0.09)
PROTHROMBIN TIME: 16.8 SEC (ref 11.7–14.9)
RBC # BLD AUTO: 4.21 M/UL (ref 4.21–5.77)
RBC # BLD AUTO: 4.33 M/UL (ref 4.21–5.77)
RSV RNA NPH QL NAA+NON-PROBE: NOT DETECTED
RV+EV RNA NPH QL NAA+NON-PROBE: NOT DETECTED
SAMPLE SITE: ABNORMAL
SARS-COV-2 RNA NPH QL NAA+NON-PROBE: NOT DETECTED
SODIUM SERPL-SCNC: 136 MMOL/L (ref 136–145)
SODIUM SERPL-SCNC: 138 MMOL/L (ref 136–145)
SODIUM SERPL-SCNC: 140 MMOL/L (ref 136–145)
SODIUM SERPL-SCNC: 140 MMOL/L (ref 136–145)
SODIUM SERPL-SCNC: 141 MMOL/L (ref 136–145)
SODIUM UR-SCNC: 20 MMOL/L
SPECIMEN DESCRIPTION: NORMAL
SPECIMEN DESCRIPTION: NORMAL
TRIGL SERPL-MCNC: 120 MG/DL
TROPONIN I SERPL HS-MCNC: 72 NG/L (ref 0–22)
VLDLC SERPL CALC-MCNC: 24 MG/DL
WBC OTHER # BLD: 15.3 K/UL (ref 3.5–11.3)
WBC OTHER # BLD: 17.5 K/UL (ref 3.5–11.3)

## 2024-06-21 PROCEDURE — 87205 SMEAR GRAM STAIN: CPT

## 2024-06-21 PROCEDURE — 85027 COMPLETE CBC AUTOMATED: CPT

## 2024-06-21 PROCEDURE — 83880 ASSAY OF NATRIURETIC PEPTIDE: CPT

## 2024-06-21 PROCEDURE — 82803 BLOOD GASES ANY COMBINATION: CPT

## 2024-06-21 PROCEDURE — 84165 PROTEIN E-PHORESIS SERUM: CPT

## 2024-06-21 PROCEDURE — 71045 X-RAY EXAM CHEST 1 VIEW: CPT

## 2024-06-21 PROCEDURE — 6360000002 HC RX W HCPCS: Performed by: STUDENT IN AN ORGANIZED HEALTH CARE EDUCATION/TRAINING PROGRAM

## 2024-06-21 PROCEDURE — 83036 HEMOGLOBIN GLYCOSYLATED A1C: CPT

## 2024-06-21 PROCEDURE — 82947 ASSAY GLUCOSE BLOOD QUANT: CPT

## 2024-06-21 PROCEDURE — 6370000000 HC RX 637 (ALT 250 FOR IP): Performed by: REGISTERED NURSE

## 2024-06-21 PROCEDURE — 84484 ASSAY OF TROPONIN QUANT: CPT

## 2024-06-21 PROCEDURE — 84155 ASSAY OF PROTEIN SERUM: CPT

## 2024-06-21 PROCEDURE — 82140 ASSAY OF AMMONIA: CPT

## 2024-06-21 PROCEDURE — 85520 HEPARIN ASSAY: CPT

## 2024-06-21 PROCEDURE — 80048 BASIC METABOLIC PNL TOTAL CA: CPT

## 2024-06-21 PROCEDURE — 2500000003 HC RX 250 WO HCPCS: Performed by: STUDENT IN AN ORGANIZED HEALTH CARE EDUCATION/TRAINING PROGRAM

## 2024-06-21 PROCEDURE — 85610 PROTHROMBIN TIME: CPT

## 2024-06-21 PROCEDURE — 37799 UNLISTED PX VASCULAR SURGERY: CPT

## 2024-06-21 PROCEDURE — 6370000000 HC RX 637 (ALT 250 FOR IP): Performed by: STUDENT IN AN ORGANIZED HEALTH CARE EDUCATION/TRAINING PROGRAM

## 2024-06-21 PROCEDURE — 94003 VENT MGMT INPAT SUBQ DAY: CPT

## 2024-06-21 PROCEDURE — 2580000003 HC RX 258: Performed by: INTERNAL MEDICINE

## 2024-06-21 PROCEDURE — 85730 THROMBOPLASTIN TIME PARTIAL: CPT

## 2024-06-21 PROCEDURE — 87040 BLOOD CULTURE FOR BACTERIA: CPT

## 2024-06-21 PROCEDURE — 2580000003 HC RX 258: Performed by: REGISTERED NURSE

## 2024-06-21 PROCEDURE — 99222 1ST HOSP IP/OBS MODERATE 55: CPT | Performed by: INTERNAL MEDICINE

## 2024-06-21 PROCEDURE — 89220 SPUTUM SPECIMEN COLLECTION: CPT

## 2024-06-21 PROCEDURE — 2580000003 HC RX 258: Performed by: STUDENT IN AN ORGANIZED HEALTH CARE EDUCATION/TRAINING PROGRAM

## 2024-06-21 PROCEDURE — 36556 INSERT NON-TUNNEL CV CATH: CPT

## 2024-06-21 PROCEDURE — 80051 ELECTROLYTE PANEL: CPT

## 2024-06-21 PROCEDURE — 84300 ASSAY OF URINE SODIUM: CPT

## 2024-06-21 PROCEDURE — 83605 ASSAY OF LACTIC ACID: CPT

## 2024-06-21 PROCEDURE — 94761 N-INVAS EAR/PLS OXIMETRY MLT: CPT

## 2024-06-21 PROCEDURE — 6360000002 HC RX W HCPCS: Performed by: REGISTERED NURSE

## 2024-06-21 PROCEDURE — 87641 MR-STAPH DNA AMP PROBE: CPT

## 2024-06-21 PROCEDURE — 99291 CRITICAL CARE FIRST HOUR: CPT | Performed by: STUDENT IN AN ORGANIZED HEALTH CARE EDUCATION/TRAINING PROGRAM

## 2024-06-21 PROCEDURE — 87070 CULTURE OTHR SPECIMN AEROBIC: CPT

## 2024-06-21 PROCEDURE — 80061 LIPID PANEL: CPT

## 2024-06-21 PROCEDURE — 02HV33Z INSERTION OF INFUSION DEVICE INTO SUPERIOR VENA CAVA, PERCUTANEOUS APPROACH: ICD-10-PCS | Performed by: INTERNAL MEDICINE

## 2024-06-21 PROCEDURE — 84145 PROCALCITONIN (PCT): CPT

## 2024-06-21 PROCEDURE — 04HY32Z INSERTION OF MONITORING DEVICE INTO LOWER ARTERY, PERCUTANEOUS APPROACH: ICD-10-PCS | Performed by: INTERNAL MEDICINE

## 2024-06-21 PROCEDURE — 2700000000 HC OXYGEN THERAPY PER DAY

## 2024-06-21 PROCEDURE — 3E043XZ INTRODUCTION OF VASOPRESSOR INTO CENTRAL VEIN, PERCUTANEOUS APPROACH: ICD-10-PCS

## 2024-06-21 PROCEDURE — 6360000002 HC RX W HCPCS

## 2024-06-21 PROCEDURE — 82550 ASSAY OF CK (CPK): CPT

## 2024-06-21 PROCEDURE — 0202U NFCT DS 22 TRGT SARS-COV-2: CPT

## 2024-06-21 PROCEDURE — 36415 COLL VENOUS BLD VENIPUNCTURE: CPT

## 2024-06-21 PROCEDURE — 2000000000 HC ICU R&B

## 2024-06-21 RX ORDER — DEXTROSE MONOHYDRATE 100 MG/ML
INJECTION, SOLUTION INTRAVENOUS CONTINUOUS PRN
Status: DISCONTINUED | OUTPATIENT
Start: 2024-06-21 | End: 2024-07-10 | Stop reason: HOSPADM

## 2024-06-21 RX ORDER — POLYETHYLENE GLYCOL 3350 17 G/17G
17 POWDER, FOR SOLUTION ORAL DAILY PRN
Status: DISCONTINUED | OUTPATIENT
Start: 2024-06-21 | End: 2024-07-06

## 2024-06-21 RX ORDER — APIXABAN 5 MG/1
5 TABLET, FILM COATED ORAL 2 TIMES DAILY
Status: ON HOLD | COMMUNITY
End: 2024-07-10 | Stop reason: HOSPADM

## 2024-06-21 RX ORDER — PHENYLEPHRINE HCL IN 0.9% NACL 50MG/250ML
10-300 PLASTIC BAG, INJECTION (ML) INTRAVENOUS CONTINUOUS
Status: DISCONTINUED | OUTPATIENT
Start: 2024-06-21 | End: 2024-06-23

## 2024-06-21 RX ORDER — ACETAMINOPHEN 325 MG/1
650 TABLET ORAL EVERY 6 HOURS PRN
Status: DISCONTINUED | OUTPATIENT
Start: 2024-06-21 | End: 2024-07-06

## 2024-06-21 RX ORDER — HEPARIN SODIUM 10000 [USP'U]/100ML
5-30 INJECTION, SOLUTION INTRAVENOUS CONTINUOUS
Status: DISCONTINUED | OUTPATIENT
Start: 2024-06-21 | End: 2024-06-30

## 2024-06-21 RX ORDER — INSULIN LISPRO 100 [IU]/ML
0-8 INJECTION, SOLUTION INTRAVENOUS; SUBCUTANEOUS EVERY 6 HOURS
Status: DISCONTINUED | OUTPATIENT
Start: 2024-06-21 | End: 2024-06-22

## 2024-06-21 RX ORDER — FENTANYL CITRATE 50 UG/ML
50 INJECTION, SOLUTION INTRAMUSCULAR; INTRAVENOUS
Status: DISCONTINUED | OUTPATIENT
Start: 2024-06-21 | End: 2024-07-01

## 2024-06-21 RX ORDER — ASPIRIN 300 MG/1
300 SUPPOSITORY RECTAL DAILY
Status: DISCONTINUED | OUTPATIENT
Start: 2024-06-22 | End: 2024-07-03

## 2024-06-21 RX ORDER — GLUCAGON 1 MG/ML
1 KIT INJECTION PRN
Status: DISCONTINUED | OUTPATIENT
Start: 2024-06-21 | End: 2024-07-10 | Stop reason: HOSPADM

## 2024-06-21 RX ORDER — FUROSEMIDE 20 MG/1
20 TABLET ORAL DAILY
Status: ON HOLD | COMMUNITY
Start: 2024-05-29

## 2024-06-21 RX ORDER — DIGOXIN 125 MCG
125 TABLET ORAL DAILY
Status: ON HOLD | COMMUNITY

## 2024-06-21 RX ORDER — ASPIRIN 81 MG/1
81 TABLET, CHEWABLE ORAL DAILY
Status: DISCONTINUED | OUTPATIENT
Start: 2024-06-22 | End: 2024-07-03

## 2024-06-21 RX ADMIN — ACETAMINOPHEN 650 MG: 325 TABLET ORAL at 15:04

## 2024-06-21 RX ADMIN — HEPARIN SODIUM 8 UNITS/KG/HR: 10000 INJECTION, SOLUTION INTRAVENOUS at 11:33

## 2024-06-21 RX ADMIN — SODIUM CHLORIDE 3000 MG: 900 INJECTION INTRAVENOUS at 16:26

## 2024-06-21 RX ADMIN — PROPOFOL 25 MCG/KG/MIN: 10 INJECTION, EMULSION INTRAVENOUS at 03:50

## 2024-06-21 RX ADMIN — PHENYLEPHRINE HYDROCHLORIDE 175 MCG/MIN: 10 INJECTION INTRAVENOUS at 09:50

## 2024-06-21 RX ADMIN — FAMOTIDINE 20 MG: 10 INJECTION, SOLUTION INTRAVENOUS at 09:21

## 2024-06-21 RX ADMIN — SODIUM CHLORIDE 100 ML/HR: 9 INJECTION, SOLUTION INTRAVENOUS at 11:54

## 2024-06-21 RX ADMIN — SODIUM CHLORIDE: 9 INJECTION, SOLUTION INTRAVENOUS at 21:06

## 2024-06-21 RX ADMIN — Medication 200 MCG/MIN: at 14:26

## 2024-06-21 RX ADMIN — PIPERACILLIN AND TAZOBACTAM 3375 MG: 3; .375 INJECTION, POWDER, LYOPHILIZED, FOR SOLUTION INTRAVENOUS at 09:04

## 2024-06-21 RX ADMIN — PHENYLEPHRINE HYDROCHLORIDE 225 MCG/MIN: 10 INJECTION INTRAVENOUS at 04:57

## 2024-06-21 RX ADMIN — ACETAMINOPHEN 650 MG: 325 TABLET ORAL at 21:03

## 2024-06-21 RX ADMIN — CHLORHEXIDINE GLUCONATE 0.12% ORAL RINSE 15 ML: 1.2 LIQUID ORAL at 20:22

## 2024-06-21 RX ADMIN — SODIUM CHLORIDE, PRESERVATIVE FREE 10 ML: 5 INJECTION INTRAVENOUS at 09:22

## 2024-06-21 RX ADMIN — INSULIN LISPRO 2 UNITS: 100 INJECTION, SOLUTION INTRAVENOUS; SUBCUTANEOUS at 18:59

## 2024-06-21 RX ADMIN — PIPERACILLIN AND TAZOBACTAM 3375 MG: 3; .375 INJECTION, POWDER, LYOPHILIZED, FOR SOLUTION INTRAVENOUS at 00:20

## 2024-06-21 RX ADMIN — PROPOFOL 20 MCG/KG/MIN: 10 INJECTION, EMULSION INTRAVENOUS at 23:08

## 2024-06-21 RX ADMIN — ASPIRIN 81 MG 81 MG: 81 TABLET ORAL at 09:21

## 2024-06-21 RX ADMIN — SODIUM CHLORIDE 3000 MG: 900 INJECTION INTRAVENOUS at 22:38

## 2024-06-21 RX ADMIN — CHLORHEXIDINE GLUCONATE 0.12% ORAL RINSE 15 ML: 1.2 LIQUID ORAL at 09:21

## 2024-06-21 RX ADMIN — PROPOFOL 25 MCG/KG/MIN: 10 INJECTION, EMULSION INTRAVENOUS at 09:01

## 2024-06-21 RX ADMIN — FAMOTIDINE 20 MG: 10 INJECTION, SOLUTION INTRAVENOUS at 20:22

## 2024-06-21 RX ADMIN — Medication 200 MCG/MIN: at 18:55

## 2024-06-21 RX ADMIN — SODIUM CHLORIDE, PRESERVATIVE FREE 10 ML: 5 INJECTION INTRAVENOUS at 20:23

## 2024-06-21 RX ADMIN — PROPOFOL 20 MCG/KG/MIN: 10 INJECTION, EMULSION INTRAVENOUS at 16:26

## 2024-06-21 RX ADMIN — INSULIN LISPRO 2 UNITS: 100 INJECTION, SOLUTION INTRAVENOUS; SUBCUTANEOUS at 13:31

## 2024-06-21 RX ADMIN — VASOPRESSIN 0.03 UNITS/MIN: 0.2 INJECTION INTRAVENOUS at 05:01

## 2024-06-21 RX ADMIN — Medication 200 MCG/MIN: at 23:42

## 2024-06-21 ASSESSMENT — PULMONARY FUNCTION TESTS
PIF_VALUE: 17
PIF_VALUE: 21
PIF_VALUE: 19
PIF_VALUE: 21
PIF_VALUE: 19
PIF_VALUE: 19
PIF_VALUE: 17
PIF_VALUE: 17
PIF_VALUE: 13
PIF_VALUE: 20
PIF_VALUE: 16
PIF_VALUE: 22
PIF_VALUE: 12
PIF_VALUE: 15

## 2024-06-21 NOTE — CARE COORDINATION
Unable to complete initial assessment. Called brother Ray Arellano this AM and again now and left  both times to call  at [phone].

## 2024-06-22 ENCOUNTER — APPOINTMENT (OUTPATIENT)
Dept: ULTRASOUND IMAGING | Age: 74
DRG: 064 | End: 2024-06-22
Payer: MEDICARE

## 2024-06-22 ENCOUNTER — APPOINTMENT (OUTPATIENT)
Dept: GENERAL RADIOLOGY | Age: 74
DRG: 064 | End: 2024-06-22
Payer: MEDICARE

## 2024-06-22 ENCOUNTER — APPOINTMENT (OUTPATIENT)
Dept: CT IMAGING | Age: 74
DRG: 064 | End: 2024-06-22
Payer: MEDICARE

## 2024-06-22 LAB
ANION GAP SERPL CALCULATED.3IONS-SCNC: 7 MMOL/L (ref 9–16)
ANION GAP SERPL CALCULATED.3IONS-SCNC: 8 MMOL/L (ref 9–16)
ANION GAP SERPL CALCULATED.3IONS-SCNC: 8 MMOL/L (ref 9–16)
ANION GAP SERPL CALCULATED.3IONS-SCNC: 9 MMOL/L (ref 9–16)
ANTI-XA UNFRAC HEPARIN: 0.36 IU/L
ANTI-XA UNFRAC HEPARIN: 0.4 IU/L
ANTI-XA UNFRAC HEPARIN: 0.42 IU/L
BUN SERPL-MCNC: 24 MG/DL (ref 8–23)
C3 SERPL-MCNC: 124 MG/DL (ref 90–180)
C4 SERPL-MCNC: 16 MG/DL (ref 10–40)
CALCIUM SERPL-MCNC: 7.6 MG/DL (ref 8.6–10.4)
CHLORIDE SERPL-SCNC: 110 MMOL/L (ref 98–107)
CHLORIDE SERPL-SCNC: 112 MMOL/L (ref 98–107)
CK SERPL-CCNC: 5457 U/L (ref 39–308)
CK SERPL-CCNC: 6394 U/L (ref 39–308)
CK SERPL-CCNC: 7868 U/L (ref 39–308)
CK SERPL-CCNC: 8931 U/L (ref 39–308)
CO2 SERPL-SCNC: 20 MMOL/L (ref 20–31)
CO2 SERPL-SCNC: 21 MMOL/L (ref 20–31)
CO2 SERPL-SCNC: 21 MMOL/L (ref 20–31)
CO2 SERPL-SCNC: 22 MMOL/L (ref 20–31)
CREAT SERPL-MCNC: 1 MG/DL (ref 0.7–1.2)
ERYTHROCYTE [DISTWIDTH] IN BLOOD BY AUTOMATED COUNT: 15.3 % (ref 11.8–14.4)
GFR, ESTIMATED: 83 ML/MIN/1.73M2
GLUCOSE BLD-MCNC: 203 MG/DL (ref 75–110)
GLUCOSE BLD-MCNC: 226 MG/DL (ref 75–110)
GLUCOSE BLD-MCNC: 231 MG/DL (ref 75–110)
GLUCOSE BLD-MCNC: 240 MG/DL (ref 75–110)
GLUCOSE BLD-MCNC: 256 MG/DL (ref 75–110)
GLUCOSE BLD-MCNC: 259 MG/DL (ref 75–110)
GLUCOSE SERPL-MCNC: 248 MG/DL (ref 74–99)
HCT VFR BLD AUTO: 38.5 % (ref 40.7–50.3)
HGB BLD-MCNC: 12.4 G/DL (ref 13–17)
MCH RBC QN AUTO: 30.4 PG (ref 25.2–33.5)
MCHC RBC AUTO-ENTMCNC: 32.2 G/DL (ref 28.4–34.8)
MCV RBC AUTO: 94.4 FL (ref 82.6–102.9)
MICROORGANISM SPEC CULT: ABNORMAL
MICROORGANISM SPEC CULT: NORMAL
MICROORGANISM/AGENT SPEC: NORMAL
MRSA, DNA, NASAL: NEGATIVE
NRBC BLD-RTO: 0 PER 100 WBC
PLATELET # BLD AUTO: 196 K/UL (ref 138–453)
PMV BLD AUTO: 11.9 FL (ref 8.1–13.5)
POTASSIUM SERPL-SCNC: 4 MMOL/L (ref 3.7–5.3)
POTASSIUM SERPL-SCNC: 4 MMOL/L (ref 3.7–5.3)
POTASSIUM SERPL-SCNC: 4.1 MMOL/L (ref 3.7–5.3)
POTASSIUM SERPL-SCNC: 4.2 MMOL/L (ref 3.7–5.3)
RBC # BLD AUTO: 4.08 M/UL (ref 4.21–5.77)
SERVICE CMNT-IMP: ABNORMAL
SERVICE CMNT-IMP: NORMAL
SODIUM SERPL-SCNC: 140 MMOL/L (ref 136–145)
SODIUM SERPL-SCNC: 140 MMOL/L (ref 136–145)
SODIUM SERPL-SCNC: 141 MMOL/L (ref 136–145)
SODIUM SERPL-SCNC: 141 MMOL/L (ref 136–145)
SPECIMEN DESCRIPTION: ABNORMAL
SPECIMEN DESCRIPTION: NORMAL
SPECIMEN DESCRIPTION: NORMAL
WBC OTHER # BLD: 11.8 K/UL (ref 3.5–11.3)

## 2024-06-22 PROCEDURE — 82947 ASSAY GLUCOSE BLOOD QUANT: CPT

## 2024-06-22 PROCEDURE — 2580000003 HC RX 258: Performed by: STUDENT IN AN ORGANIZED HEALTH CARE EDUCATION/TRAINING PROGRAM

## 2024-06-22 PROCEDURE — 94003 VENT MGMT INPAT SUBQ DAY: CPT

## 2024-06-22 PROCEDURE — 6370000000 HC RX 637 (ALT 250 FOR IP): Performed by: REGISTERED NURSE

## 2024-06-22 PROCEDURE — 6360000002 HC RX W HCPCS: Performed by: STUDENT IN AN ORGANIZED HEALTH CARE EDUCATION/TRAINING PROGRAM

## 2024-06-22 PROCEDURE — 2500000003 HC RX 250 WO HCPCS: Performed by: STUDENT IN AN ORGANIZED HEALTH CARE EDUCATION/TRAINING PROGRAM

## 2024-06-22 PROCEDURE — 2580000003 HC RX 258

## 2024-06-22 PROCEDURE — 6360000002 HC RX W HCPCS

## 2024-06-22 PROCEDURE — 76770 US EXAM ABDO BACK WALL COMP: CPT

## 2024-06-22 PROCEDURE — 85520 HEPARIN ASSAY: CPT

## 2024-06-22 PROCEDURE — 94761 N-INVAS EAR/PLS OXIMETRY MLT: CPT

## 2024-06-22 PROCEDURE — 2580000003 HC RX 258: Performed by: REGISTERED NURSE

## 2024-06-22 PROCEDURE — 70450 CT HEAD/BRAIN W/O DYE: CPT

## 2024-06-22 PROCEDURE — 80051 ELECTROLYTE PANEL: CPT

## 2024-06-22 PROCEDURE — 2000000000 HC ICU R&B

## 2024-06-22 PROCEDURE — 6370000000 HC RX 637 (ALT 250 FOR IP): Performed by: STUDENT IN AN ORGANIZED HEALTH CARE EDUCATION/TRAINING PROGRAM

## 2024-06-22 PROCEDURE — 6370000000 HC RX 637 (ALT 250 FOR IP)

## 2024-06-22 PROCEDURE — 99291 CRITICAL CARE FIRST HOUR: CPT | Performed by: STUDENT IN AN ORGANIZED HEALTH CARE EDUCATION/TRAINING PROGRAM

## 2024-06-22 PROCEDURE — 71045 X-RAY EXAM CHEST 1 VIEW: CPT

## 2024-06-22 PROCEDURE — 85027 COMPLETE CBC AUTOMATED: CPT

## 2024-06-22 PROCEDURE — 80048 BASIC METABOLIC PNL TOTAL CA: CPT

## 2024-06-22 PROCEDURE — 86160 COMPLEMENT ANTIGEN: CPT

## 2024-06-22 PROCEDURE — 99232 SBSQ HOSP IP/OBS MODERATE 35: CPT | Performed by: INTERNAL MEDICINE

## 2024-06-22 PROCEDURE — 82550 ASSAY OF CK (CPK): CPT

## 2024-06-22 PROCEDURE — 6360000002 HC RX W HCPCS: Performed by: INTERNAL MEDICINE

## 2024-06-22 PROCEDURE — 2700000000 HC OXYGEN THERAPY PER DAY

## 2024-06-22 RX ORDER — INSULIN GLARGINE 100 [IU]/ML
15 INJECTION, SOLUTION SUBCUTANEOUS NIGHTLY
Status: DISCONTINUED | OUTPATIENT
Start: 2024-06-22 | End: 2024-06-23

## 2024-06-22 RX ORDER — FUROSEMIDE 10 MG/ML
40 INJECTION INTRAMUSCULAR; INTRAVENOUS ONCE
Status: COMPLETED | OUTPATIENT
Start: 2024-06-22 | End: 2024-06-22

## 2024-06-22 RX ORDER — INSULIN LISPRO 100 [IU]/ML
0-16 INJECTION, SOLUTION INTRAVENOUS; SUBCUTANEOUS EVERY 6 HOURS
Status: DISCONTINUED | OUTPATIENT
Start: 2024-06-22 | End: 2024-07-10 | Stop reason: HOSPADM

## 2024-06-22 RX ADMIN — PROPOFOL 20 MCG/KG/MIN: 10 INJECTION, EMULSION INTRAVENOUS at 06:02

## 2024-06-22 RX ADMIN — FUROSEMIDE 40 MG: 10 INJECTION, SOLUTION INTRAMUSCULAR; INTRAVENOUS at 19:43

## 2024-06-22 RX ADMIN — METOPROLOL TARTRATE 25 MG: 25 TABLET, FILM COATED ORAL at 19:43

## 2024-06-22 RX ADMIN — SODIUM CHLORIDE, PRESERVATIVE FREE 10 ML: 5 INJECTION INTRAVENOUS at 08:20

## 2024-06-22 RX ADMIN — SODIUM CHLORIDE: 9 INJECTION, SOLUTION INTRAVENOUS at 19:34

## 2024-06-22 RX ADMIN — SODIUM CHLORIDE, PRESERVATIVE FREE 10 ML: 5 INJECTION INTRAVENOUS at 19:45

## 2024-06-22 RX ADMIN — INSULIN LISPRO 2 UNITS: 100 INJECTION, SOLUTION INTRAVENOUS; SUBCUTANEOUS at 08:17

## 2024-06-22 RX ADMIN — INSULIN LISPRO 8 UNITS: 100 INJECTION, SOLUTION INTRAVENOUS; SUBCUTANEOUS at 16:25

## 2024-06-22 RX ADMIN — FAMOTIDINE 20 MG: 10 INJECTION, SOLUTION INTRAVENOUS at 19:43

## 2024-06-22 RX ADMIN — CHLORHEXIDINE GLUCONATE 0.12% ORAL RINSE 15 ML: 1.2 LIQUID ORAL at 08:17

## 2024-06-22 RX ADMIN — SODIUM CHLORIDE 3000 MG: 900 INJECTION INTRAVENOUS at 17:13

## 2024-06-22 RX ADMIN — INSULIN LISPRO 4 UNITS: 100 INJECTION, SOLUTION INTRAVENOUS; SUBCUTANEOUS at 22:49

## 2024-06-22 RX ADMIN — SODIUM CHLORIDE 3000 MG: 900 INJECTION INTRAVENOUS at 04:33

## 2024-06-22 RX ADMIN — CHLORHEXIDINE GLUCONATE 0.12% ORAL RINSE 15 ML: 1.2 LIQUID ORAL at 20:38

## 2024-06-22 RX ADMIN — FAMOTIDINE 20 MG: 10 INJECTION, SOLUTION INTRAVENOUS at 08:17

## 2024-06-22 RX ADMIN — INSULIN LISPRO 2 UNITS: 100 INJECTION, SOLUTION INTRAVENOUS; SUBCUTANEOUS at 00:10

## 2024-06-22 RX ADMIN — SODIUM CHLORIDE 3000 MG: 900 INJECTION INTRAVENOUS at 10:49

## 2024-06-22 RX ADMIN — SODIUM CHLORIDE 3000 MG: 900 INJECTION INTRAVENOUS at 22:50

## 2024-06-22 RX ADMIN — ACETAMINOPHEN 650 MG: 325 TABLET ORAL at 20:55

## 2024-06-22 RX ADMIN — Medication 175 MCG/MIN: at 03:40

## 2024-06-22 RX ADMIN — METOPROLOL TARTRATE 25 MG: 25 TABLET, FILM COATED ORAL at 12:06

## 2024-06-22 RX ADMIN — Medication 65 MCG/MIN: at 10:07

## 2024-06-22 RX ADMIN — INSULIN LISPRO 8 UNITS: 100 INJECTION, SOLUTION INTRAVENOUS; SUBCUTANEOUS at 12:06

## 2024-06-22 RX ADMIN — INSULIN GLARGINE 15 UNITS: 100 INJECTION, SOLUTION SUBCUTANEOUS at 19:42

## 2024-06-22 RX ADMIN — HEPARIN SODIUM 12 UNITS/KG/HR: 10000 INJECTION, SOLUTION INTRAVENOUS at 08:58

## 2024-06-22 RX ADMIN — ASPIRIN 81 MG 81 MG: 81 TABLET ORAL at 08:17

## 2024-06-22 RX ADMIN — FENTANYL CITRATE 50 MCG: 50 INJECTION, SOLUTION INTRAMUSCULAR; INTRAVENOUS at 11:58

## 2024-06-22 ASSESSMENT — PULMONARY FUNCTION TESTS
PIF_VALUE: 22
PIF_VALUE: 16
PIF_VALUE: 12
PIF_VALUE: 17
PIF_VALUE: 23
PIF_VALUE: 11
PIF_VALUE: 15
PIF_VALUE: 15
PIF_VALUE: 19
PIF_VALUE: 22
PIF_VALUE: 24
PIF_VALUE: 15
PIF_VALUE: 19
PIF_VALUE: 14
PIF_VALUE: 25
PIF_VALUE: 17
PIF_VALUE: 28
PIF_VALUE: 26

## 2024-06-23 ENCOUNTER — APPOINTMENT (OUTPATIENT)
Dept: GENERAL RADIOLOGY | Age: 74
DRG: 064 | End: 2024-06-23
Payer: MEDICARE

## 2024-06-23 LAB
ALLEN TEST: ABNORMAL
ANION GAP SERPL CALCULATED.3IONS-SCNC: 10 MMOL/L (ref 9–16)
ANION GAP SERPL CALCULATED.3IONS-SCNC: 9 MMOL/L (ref 9–16)
ANION GAP SERPL CALCULATED.3IONS-SCNC: 9 MMOL/L (ref 9–16)
ANTI-XA UNFRAC HEPARIN: 0.39 IU/L
BUN SERPL-MCNC: 25 MG/DL (ref 8–23)
CALCIUM SERPL-MCNC: 8.5 MG/DL (ref 8.6–10.4)
CHLORIDE SERPL-SCNC: 110 MMOL/L (ref 98–107)
CHLORIDE SERPL-SCNC: 111 MMOL/L (ref 98–107)
CHLORIDE SERPL-SCNC: 111 MMOL/L (ref 98–107)
CK SERPL-CCNC: 3762 U/L (ref 39–308)
CK SERPL-CCNC: 4725 U/L (ref 39–308)
CO2 SERPL-SCNC: 23 MMOL/L (ref 20–31)
CO2 SERPL-SCNC: 23 MMOL/L (ref 20–31)
CO2 SERPL-SCNC: 25 MMOL/L (ref 20–31)
CREAT SERPL-MCNC: 0.9 MG/DL (ref 0.7–1.2)
ERYTHROCYTE [DISTWIDTH] IN BLOOD BY AUTOMATED COUNT: 15.7 % (ref 11.8–14.4)
FIO2: 50
GFR, ESTIMATED: 88 ML/MIN/1.73M2
GLUCOSE BLD-MCNC: 242 MG/DL (ref 75–110)
GLUCOSE BLD-MCNC: 251 MG/DL (ref 75–110)
GLUCOSE BLD-MCNC: 257 MG/DL (ref 75–110)
GLUCOSE BLD-MCNC: 258 MG/DL (ref 75–110)
GLUCOSE BLD-MCNC: 270 MG/DL (ref 74–100)
GLUCOSE BLD-MCNC: 280 MG/DL (ref 75–110)
GLUCOSE SERPL-MCNC: 262 MG/DL (ref 74–99)
HCT VFR BLD AUTO: 38 % (ref 40.7–50.3)
HGB BLD-MCNC: 12.4 G/DL (ref 13–17)
MCH RBC QN AUTO: 30.7 PG (ref 25.2–33.5)
MCHC RBC AUTO-ENTMCNC: 32.6 G/DL (ref 28.4–34.8)
MCV RBC AUTO: 94.1 FL (ref 82.6–102.9)
NEGATIVE BASE EXCESS, ART: 0.6 MMOL/L (ref 0–2)
NRBC BLD-RTO: 0 PER 100 WBC
O2 DELIVERY DEVICE: ABNORMAL
PLATELET # BLD AUTO: 170 K/UL (ref 138–453)
PMV BLD AUTO: 12.1 FL (ref 8.1–13.5)
POC HCO3: 24.4 MMOL/L (ref 21–28)
POC O2 SATURATION: 98.9 % (ref 94–98)
POC PCO2: 40.8 MM HG (ref 35–48)
POC PH: 7.39 (ref 7.35–7.45)
POC PO2: 131.5 MM HG (ref 83–108)
POTASSIUM SERPL-SCNC: 3.9 MMOL/L (ref 3.7–5.3)
POTASSIUM SERPL-SCNC: 3.9 MMOL/L (ref 3.7–5.3)
POTASSIUM SERPL-SCNC: 4.1 MMOL/L (ref 3.7–5.3)
RBC # BLD AUTO: 4.04 M/UL (ref 4.21–5.77)
SAMPLE SITE: ABNORMAL
SODIUM SERPL-SCNC: 143 MMOL/L (ref 136–145)
SODIUM SERPL-SCNC: 144 MMOL/L (ref 136–145)
SODIUM SERPL-SCNC: 144 MMOL/L (ref 136–145)
WBC OTHER # BLD: 10 K/UL (ref 3.5–11.3)

## 2024-06-23 PROCEDURE — 2580000003 HC RX 258: Performed by: REGISTERED NURSE

## 2024-06-23 PROCEDURE — 2580000003 HC RX 258: Performed by: STUDENT IN AN ORGANIZED HEALTH CARE EDUCATION/TRAINING PROGRAM

## 2024-06-23 PROCEDURE — 2580000003 HC RX 258

## 2024-06-23 PROCEDURE — 6360000002 HC RX W HCPCS: Performed by: REGISTERED NURSE

## 2024-06-23 PROCEDURE — 6360000002 HC RX W HCPCS: Performed by: STUDENT IN AN ORGANIZED HEALTH CARE EDUCATION/TRAINING PROGRAM

## 2024-06-23 PROCEDURE — 2000000000 HC ICU R&B

## 2024-06-23 PROCEDURE — 82550 ASSAY OF CK (CPK): CPT

## 2024-06-23 PROCEDURE — 6370000000 HC RX 637 (ALT 250 FOR IP): Performed by: REGISTERED NURSE

## 2024-06-23 PROCEDURE — 82803 BLOOD GASES ANY COMBINATION: CPT

## 2024-06-23 PROCEDURE — 82947 ASSAY GLUCOSE BLOOD QUANT: CPT

## 2024-06-23 PROCEDURE — 85520 HEPARIN ASSAY: CPT

## 2024-06-23 PROCEDURE — 6360000002 HC RX W HCPCS

## 2024-06-23 PROCEDURE — 2500000003 HC RX 250 WO HCPCS: Performed by: STUDENT IN AN ORGANIZED HEALTH CARE EDUCATION/TRAINING PROGRAM

## 2024-06-23 PROCEDURE — 6370000000 HC RX 637 (ALT 250 FOR IP): Performed by: STUDENT IN AN ORGANIZED HEALTH CARE EDUCATION/TRAINING PROGRAM

## 2024-06-23 PROCEDURE — 94761 N-INVAS EAR/PLS OXIMETRY MLT: CPT

## 2024-06-23 PROCEDURE — 99291 CRITICAL CARE FIRST HOUR: CPT | Performed by: STUDENT IN AN ORGANIZED HEALTH CARE EDUCATION/TRAINING PROGRAM

## 2024-06-23 PROCEDURE — 37799 UNLISTED PX VASCULAR SURGERY: CPT

## 2024-06-23 PROCEDURE — 80048 BASIC METABOLIC PNL TOTAL CA: CPT

## 2024-06-23 PROCEDURE — 6370000000 HC RX 637 (ALT 250 FOR IP)

## 2024-06-23 PROCEDURE — 80051 ELECTROLYTE PANEL: CPT

## 2024-06-23 PROCEDURE — 85027 COMPLETE CBC AUTOMATED: CPT

## 2024-06-23 PROCEDURE — 71045 X-RAY EXAM CHEST 1 VIEW: CPT

## 2024-06-23 PROCEDURE — 2700000000 HC OXYGEN THERAPY PER DAY

## 2024-06-23 PROCEDURE — 94003 VENT MGMT INPAT SUBQ DAY: CPT

## 2024-06-23 RX ORDER — FUROSEMIDE 20 MG/1
20 TABLET ORAL 2 TIMES DAILY
Status: DISCONTINUED | OUTPATIENT
Start: 2024-06-23 | End: 2024-07-03

## 2024-06-23 RX ORDER — ATORVASTATIN CALCIUM 80 MG/1
80 TABLET, FILM COATED ORAL NIGHTLY
Status: DISCONTINUED | OUTPATIENT
Start: 2024-06-23 | End: 2024-07-06

## 2024-06-23 RX ORDER — FUROSEMIDE 10 MG/ML
20 INJECTION INTRAMUSCULAR; INTRAVENOUS ONCE
Status: COMPLETED | OUTPATIENT
Start: 2024-06-23 | End: 2024-06-23

## 2024-06-23 RX ORDER — DIGOXIN 0.25 MG/ML
125 INJECTION INTRAMUSCULAR; INTRAVENOUS DAILY
Status: DISCONTINUED | OUTPATIENT
Start: 2024-06-23 | End: 2024-06-24

## 2024-06-23 RX ORDER — INSULIN GLARGINE 100 [IU]/ML
30 INJECTION, SOLUTION SUBCUTANEOUS NIGHTLY
Status: DISCONTINUED | OUTPATIENT
Start: 2024-06-23 | End: 2024-06-26

## 2024-06-23 RX ADMIN — FENTANYL CITRATE 50 MCG: 50 INJECTION, SOLUTION INTRAMUSCULAR; INTRAVENOUS at 18:02

## 2024-06-23 RX ADMIN — ATORVASTATIN CALCIUM 80 MG: 80 TABLET, FILM COATED ORAL at 19:53

## 2024-06-23 RX ADMIN — INSULIN LISPRO 8 UNITS: 100 INJECTION, SOLUTION INTRAVENOUS; SUBCUTANEOUS at 22:39

## 2024-06-23 RX ADMIN — FAMOTIDINE 20 MG: 10 INJECTION, SOLUTION INTRAVENOUS at 19:53

## 2024-06-23 RX ADMIN — SODIUM CHLORIDE 3000 MG: 900 INJECTION INTRAVENOUS at 17:12

## 2024-06-23 RX ADMIN — FUROSEMIDE 20 MG: 20 TABLET ORAL at 17:12

## 2024-06-23 RX ADMIN — DIGOXIN 125 MCG: 0.25 INJECTION INTRAMUSCULAR; INTRAVENOUS at 12:03

## 2024-06-23 RX ADMIN — FAMOTIDINE 20 MG: 10 INJECTION, SOLUTION INTRAVENOUS at 07:53

## 2024-06-23 RX ADMIN — CHLORHEXIDINE GLUCONATE 0.12% ORAL RINSE 15 ML: 1.2 LIQUID ORAL at 11:15

## 2024-06-23 RX ADMIN — METOPROLOL TARTRATE 25 MG: 25 TABLET, FILM COATED ORAL at 07:53

## 2024-06-23 RX ADMIN — INSULIN LISPRO 8 UNITS: 100 INJECTION, SOLUTION INTRAVENOUS; SUBCUTANEOUS at 05:04

## 2024-06-23 RX ADMIN — HEPARIN SODIUM 12 UNITS/KG/HR: 10000 INJECTION, SOLUTION INTRAVENOUS at 21:55

## 2024-06-23 RX ADMIN — CHLORHEXIDINE GLUCONATE 0.12% ORAL RINSE 15 ML: 1.2 LIQUID ORAL at 19:53

## 2024-06-23 RX ADMIN — INSULIN GLARGINE 30 UNITS: 100 INJECTION, SOLUTION SUBCUTANEOUS at 19:53

## 2024-06-23 RX ADMIN — METOPROLOL TARTRATE 25 MG: 25 TABLET, FILM COATED ORAL at 19:53

## 2024-06-23 RX ADMIN — FENTANYL CITRATE 50 MCG: 50 INJECTION, SOLUTION INTRAMUSCULAR; INTRAVENOUS at 08:07

## 2024-06-23 RX ADMIN — SODIUM CHLORIDE 3000 MG: 900 INJECTION INTRAVENOUS at 05:06

## 2024-06-23 RX ADMIN — INSULIN LISPRO 8 UNITS: 100 INJECTION, SOLUTION INTRAVENOUS; SUBCUTANEOUS at 17:12

## 2024-06-23 RX ADMIN — LABETALOL HYDROCHLORIDE 10 MG: 5 INJECTION, SOLUTION INTRAVENOUS at 18:52

## 2024-06-23 RX ADMIN — ASPIRIN 81 MG 81 MG: 81 TABLET ORAL at 07:53

## 2024-06-23 RX ADMIN — SODIUM CHLORIDE, PRESERVATIVE FREE 10 ML: 5 INJECTION INTRAVENOUS at 19:58

## 2024-06-23 RX ADMIN — INSULIN LISPRO 8 UNITS: 100 INJECTION, SOLUTION INTRAVENOUS; SUBCUTANEOUS at 11:26

## 2024-06-23 RX ADMIN — HEPARIN SODIUM 12 UNITS/KG/HR: 10000 INJECTION, SOLUTION INTRAVENOUS at 03:50

## 2024-06-23 RX ADMIN — SODIUM CHLORIDE: 9 INJECTION, SOLUTION INTRAVENOUS at 03:49

## 2024-06-23 RX ADMIN — FUROSEMIDE 20 MG: 10 INJECTION, SOLUTION INTRAMUSCULAR; INTRAVENOUS at 11:28

## 2024-06-23 RX ADMIN — SODIUM CHLORIDE 3000 MG: 900 INJECTION INTRAVENOUS at 22:33

## 2024-06-23 RX ADMIN — FENTANYL CITRATE 50 MCG: 50 INJECTION, SOLUTION INTRAMUSCULAR; INTRAVENOUS at 12:20

## 2024-06-23 RX ADMIN — SODIUM CHLORIDE 3000 MG: 900 INJECTION INTRAVENOUS at 11:07

## 2024-06-23 RX ADMIN — SODIUM CHLORIDE, PRESERVATIVE FREE 10 ML: 5 INJECTION INTRAVENOUS at 07:54

## 2024-06-23 ASSESSMENT — PULMONARY FUNCTION TESTS
PIF_VALUE: 11
PIF_VALUE: 13
PIF_VALUE: 19
PIF_VALUE: 16
PIF_VALUE: 8
PIF_VALUE: 14
PIF_VALUE: 16
PIF_VALUE: 19
PIF_VALUE: 16
PIF_VALUE: 33
PIF_VALUE: 18
PIF_VALUE: 22
PIF_VALUE: 16
PIF_VALUE: 17
PIF_VALUE: 14
PIF_VALUE: 18
PIF_VALUE: 16
PIF_VALUE: 17
PIF_VALUE: 15
PIF_VALUE: 16
PIF_VALUE: 21
PIF_VALUE: 19
PIF_VALUE: 11
PIF_VALUE: 19

## 2024-06-24 ENCOUNTER — APPOINTMENT (OUTPATIENT)
Age: 74
DRG: 064 | End: 2024-06-24
Payer: MEDICARE

## 2024-06-24 ENCOUNTER — APPOINTMENT (OUTPATIENT)
Dept: GENERAL RADIOLOGY | Age: 74
DRG: 064 | End: 2024-06-24
Payer: MEDICARE

## 2024-06-24 LAB
AMMONIA PLAS-SCNC: 39 UMOL/L (ref 16–60)
ANION GAP SERPL CALCULATED.3IONS-SCNC: 5 MMOL/L (ref 9–16)
ANTI-XA UNFRAC HEPARIN: 0.3 IU/L
BASOPHILS # BLD: <0.03 K/UL (ref 0–0.2)
BASOPHILS NFR BLD: 0 % (ref 0–2)
BUN SERPL-MCNC: 25 MG/DL (ref 8–23)
CALCIUM SERPL-MCNC: 9 MG/DL (ref 8.6–10.4)
CHLORIDE SERPL-SCNC: 110 MMOL/L (ref 98–107)
CK SERPL-CCNC: 1629 U/L (ref 39–308)
CO2 SERPL-SCNC: 30 MMOL/L (ref 20–31)
CREAT SERPL-MCNC: 0.8 MG/DL (ref 0.7–1.2)
ECHO AO ROOT DIAM: 3.1 CM
ECHO AO ROOT INDEX: 1.28 CM/M2
ECHO AV AREA PEAK VELOCITY: 1.8 CM2
ECHO AV AREA VTI: 1.9 CM2
ECHO AV AREA/BSA PEAK VELOCITY: 0.7 CM2/M2
ECHO AV AREA/BSA VTI: 0.8 CM2/M2
ECHO AV MEAN GRADIENT: 6 MMHG
ECHO AV MEAN VELOCITY: 1.1 M/S
ECHO AV PEAK GRADIENT: 11 MMHG
ECHO AV PEAK VELOCITY: 1.7 M/S
ECHO AV VELOCITY RATIO: 0.47
ECHO AV VTI: 27.6 CM
ECHO BSA: 2.55 M2
ECHO EST RA PRESSURE: 8 MMHG
ECHO LA AREA 4C: 24.7 CM2
ECHO LA DIAMETER INDEX: 1.94 CM/M2
ECHO LA DIAMETER: 4.7 CM
ECHO LA MAJOR AXIS: 6.3 CM
ECHO LA TO AORTIC ROOT RATIO: 1.52
ECHO LA VOL MOD A4C: 77 ML (ref 18–58)
ECHO LA VOLUME INDEX MOD A4C: 32 ML/M2 (ref 16–34)
ECHO LV E' LATERAL VELOCITY: 12 CM/S
ECHO LV E' SEPTAL VELOCITY: 9 CM/S
ECHO LV FRACTIONAL SHORTENING: 2 % (ref 28–44)
ECHO LV INTERNAL DIMENSION DIASTOLE INDEX: 1.94 CM/M2
ECHO LV INTERNAL DIMENSION DIASTOLIC: 4.7 CM (ref 4.2–5.9)
ECHO LV INTERNAL DIMENSION SYSTOLIC INDEX: 1.9 CM/M2
ECHO LV INTERNAL DIMENSION SYSTOLIC: 4.6 CM
ECHO LV IVSD: 1.1 CM (ref 0.6–1)
ECHO LV MASS 2D: 199.6 G (ref 88–224)
ECHO LV MASS INDEX 2D: 82.5 G/M2 (ref 49–115)
ECHO LV POSTERIOR WALL DIASTOLIC: 1.2 CM (ref 0.6–1)
ECHO LV RELATIVE WALL THICKNESS RATIO: 0.51
ECHO LVOT AREA: 3.8 CM2
ECHO LVOT AV VTI INDEX: 0.51
ECHO LVOT DIAM: 2.2 CM
ECHO LVOT MEAN GRADIENT: 1 MMHG
ECHO LVOT PEAK GRADIENT: 3 MMHG
ECHO LVOT PEAK VELOCITY: 0.8 M/S
ECHO LVOT STROKE VOLUME INDEX: 22 ML/M2
ECHO LVOT SV: 53.2 ML
ECHO LVOT VTI: 14 CM
ECHO MV A VELOCITY: 0.9 M/S
ECHO MV AREA VTI: 3.1 CM2
ECHO MV E DECELERATION TIME (DT): 98 MS
ECHO MV E VELOCITY: 0.7 M/S
ECHO MV E/A RATIO: 0.78
ECHO MV E/E' LATERAL: 5.83
ECHO MV E/E' RATIO (AVERAGED): 6.81
ECHO MV E/E' SEPTAL: 7.78
ECHO MV LVOT VTI INDEX: 1.24
ECHO MV MAX VELOCITY: 1.2 M/S
ECHO MV MEAN GRADIENT: 3 MMHG
ECHO MV MEAN VELOCITY: 0.7 M/S
ECHO MV PEAK GRADIENT: 5 MMHG
ECHO MV VTI: 17.4 CM
ECHO PV MAX VELOCITY: 1 M/S
ECHO PV PEAK GRADIENT: 4 MMHG
ECHO RIGHT VENTRICULAR SYSTOLIC PRESSURE (RVSP): 55 MMHG
ECHO RV BASAL DIMENSION: 3.8 CM
ECHO RV FREE WALL PEAK S': 13 CM/S
ECHO RV TAPSE: 1.3 CM (ref 1.7–?)
ECHO TV REGURGITANT MAX VELOCITY: 3.42 M/S
ECHO TV REGURGITANT PEAK GRADIENT: 47 MMHG
EOSINOPHIL # BLD: 0.12 K/UL (ref 0–0.44)
EOSINOPHILS RELATIVE PERCENT: 2 % (ref 1–4)
ERYTHROCYTE [DISTWIDTH] IN BLOOD BY AUTOMATED COUNT: 15.5 % (ref 11.8–14.4)
FIO2: 40
FOLATE SERPL-MCNC: 6.9 NG/ML (ref 4.8–24.2)
GFR, ESTIMATED: >90 ML/MIN/1.73M2
GLUCOSE BLD-MCNC: 191 MG/DL (ref 75–110)
GLUCOSE BLD-MCNC: 221 MG/DL (ref 75–110)
GLUCOSE BLD-MCNC: 228 MG/DL (ref 75–110)
GLUCOSE BLD-MCNC: 254 MG/DL (ref 75–110)
GLUCOSE BLD-MCNC: 265 MG/DL (ref 74–100)
GLUCOSE BLD-MCNC: 269 MG/DL (ref 75–110)
GLUCOSE SERPL-MCNC: 251 MG/DL (ref 74–99)
HCT VFR BLD AUTO: 34.8 % (ref 40.7–50.3)
HGB BLD-MCNC: 11.2 G/DL (ref 13–17)
IMM GRANULOCYTES # BLD AUTO: 0.04 K/UL (ref 0–0.3)
IMM GRANULOCYTES NFR BLD: 1 %
LYMPHOCYTES NFR BLD: 1.49 K/UL (ref 1.1–3.7)
LYMPHOCYTES RELATIVE PERCENT: 18 % (ref 24–43)
MCH RBC QN AUTO: 30.4 PG (ref 25.2–33.5)
MCHC RBC AUTO-ENTMCNC: 32.2 G/DL (ref 28.4–34.8)
MCV RBC AUTO: 94.3 FL (ref 82.6–102.9)
MONOCYTES NFR BLD: 0.7 K/UL (ref 0.1–1.2)
MONOCYTES NFR BLD: 9 % (ref 3–12)
NEUTROPHILS NFR BLD: 70 % (ref 36–65)
NEUTS SEG NFR BLD: 5.78 K/UL (ref 1.5–8.1)
NRBC BLD-RTO: 0 PER 100 WBC
PLATELET # BLD AUTO: 176 K/UL (ref 138–453)
PMV BLD AUTO: 12.2 FL (ref 8.1–13.5)
POC HCO3: 29.4 MMOL/L (ref 21–28)
POC O2 SATURATION: 97.7 % (ref 94–98)
POC PCO2: 43.1 MM HG (ref 35–48)
POC PH: 7.44 (ref 7.35–7.45)
POC PO2: 96.3 MM HG (ref 83–108)
POSITIVE BASE EXCESS, ART: 4.7 MMOL/L (ref 0–3)
POTASSIUM SERPL-SCNC: 3.9 MMOL/L (ref 3.7–5.3)
RBC # BLD AUTO: 3.69 M/UL (ref 4.21–5.77)
RBC # BLD: ABNORMAL 10*6/UL
SODIUM SERPL-SCNC: 145 MMOL/L (ref 136–145)
TSH SERPL DL<=0.05 MIU/L-ACNC: 2.23 UIU/ML (ref 0.27–4.2)
VIT B12 SERPL-MCNC: 1047 PG/ML (ref 232–1245)
WBC OTHER # BLD: 8.2 K/UL (ref 3.5–11.3)

## 2024-06-24 PROCEDURE — 80048 BASIC METABOLIC PNL TOTAL CA: CPT

## 2024-06-24 PROCEDURE — 2580000003 HC RX 258: Performed by: STUDENT IN AN ORGANIZED HEALTH CARE EDUCATION/TRAINING PROGRAM

## 2024-06-24 PROCEDURE — 95714 VEEG EA 12-26 HR UNMNTR: CPT

## 2024-06-24 PROCEDURE — 6360000002 HC RX W HCPCS

## 2024-06-24 PROCEDURE — 82947 ASSAY GLUCOSE BLOOD QUANT: CPT

## 2024-06-24 PROCEDURE — 6360000002 HC RX W HCPCS: Performed by: REGISTERED NURSE

## 2024-06-24 PROCEDURE — 93306 TTE W/DOPPLER COMPLETE: CPT | Performed by: INTERNAL MEDICINE

## 2024-06-24 PROCEDURE — 2580000003 HC RX 258: Performed by: PSYCHIATRY & NEUROLOGY

## 2024-06-24 PROCEDURE — 2700000000 HC OXYGEN THERAPY PER DAY

## 2024-06-24 PROCEDURE — 82803 BLOOD GASES ANY COMBINATION: CPT

## 2024-06-24 PROCEDURE — 6370000000 HC RX 637 (ALT 250 FOR IP): Performed by: REGISTERED NURSE

## 2024-06-24 PROCEDURE — 4A10X4Z MONITORING OF CENTRAL NERVOUS ELECTRICAL ACTIVITY, EXTERNAL APPROACH: ICD-10-PCS | Performed by: PSYCHIATRY & NEUROLOGY

## 2024-06-24 PROCEDURE — 82746 ASSAY OF FOLIC ACID SERUM: CPT

## 2024-06-24 PROCEDURE — 94761 N-INVAS EAR/PLS OXIMETRY MLT: CPT

## 2024-06-24 PROCEDURE — 2000000000 HC ICU R&B

## 2024-06-24 PROCEDURE — 6370000000 HC RX 637 (ALT 250 FOR IP)

## 2024-06-24 PROCEDURE — 94003 VENT MGMT INPAT SUBQ DAY: CPT

## 2024-06-24 PROCEDURE — 84443 ASSAY THYROID STIM HORMONE: CPT

## 2024-06-24 PROCEDURE — 95700 EEG CONT REC W/VID EEG TECH: CPT

## 2024-06-24 PROCEDURE — 6370000000 HC RX 637 (ALT 250 FOR IP): Performed by: STUDENT IN AN ORGANIZED HEALTH CARE EDUCATION/TRAINING PROGRAM

## 2024-06-24 PROCEDURE — 37799 UNLISTED PX VASCULAR SURGERY: CPT

## 2024-06-24 PROCEDURE — 85520 HEPARIN ASSAY: CPT

## 2024-06-24 PROCEDURE — 6360000002 HC RX W HCPCS: Performed by: STUDENT IN AN ORGANIZED HEALTH CARE EDUCATION/TRAINING PROGRAM

## 2024-06-24 PROCEDURE — 85025 COMPLETE CBC W/AUTO DIFF WBC: CPT

## 2024-06-24 PROCEDURE — 2580000003 HC RX 258: Performed by: REGISTERED NURSE

## 2024-06-24 PROCEDURE — 82607 VITAMIN B-12: CPT

## 2024-06-24 PROCEDURE — 71045 X-RAY EXAM CHEST 1 VIEW: CPT

## 2024-06-24 PROCEDURE — 6360000002 HC RX W HCPCS: Performed by: PSYCHIATRY & NEUROLOGY

## 2024-06-24 PROCEDURE — 82140 ASSAY OF AMMONIA: CPT

## 2024-06-24 PROCEDURE — 2500000003 HC RX 250 WO HCPCS: Performed by: STUDENT IN AN ORGANIZED HEALTH CARE EDUCATION/TRAINING PROGRAM

## 2024-06-24 PROCEDURE — 82550 ASSAY OF CK (CPK): CPT

## 2024-06-24 PROCEDURE — 93306 TTE W/DOPPLER COMPLETE: CPT

## 2024-06-24 RX ORDER — LABETALOL HYDROCHLORIDE 5 MG/ML
10 INJECTION, SOLUTION INTRAVENOUS
Status: DISCONTINUED | OUTPATIENT
Start: 2024-06-24 | End: 2024-07-01

## 2024-06-24 RX ORDER — DIGOXIN 125 MCG
125 TABLET ORAL DAILY
Status: DISCONTINUED | OUTPATIENT
Start: 2024-06-25 | End: 2024-06-24

## 2024-06-24 RX ORDER — NOREPINEPHRINE BITARTRATE 0.06 MG/ML
INJECTION, SOLUTION INTRAVENOUS
Status: DISCONTINUED
Start: 2024-06-24 | End: 2024-06-24 | Stop reason: WASHOUT

## 2024-06-24 RX ORDER — DIGOXIN 125 MCG
125 TABLET ORAL DAILY
Status: DISCONTINUED | OUTPATIENT
Start: 2024-06-25 | End: 2024-07-06

## 2024-06-24 RX ORDER — FAMOTIDINE 20 MG/1
20 TABLET, FILM COATED ORAL 2 TIMES DAILY
Status: DISCONTINUED | OUTPATIENT
Start: 2024-06-24 | End: 2024-07-01

## 2024-06-24 RX ADMIN — METOPROLOL TARTRATE 25 MG: 25 TABLET, FILM COATED ORAL at 07:38

## 2024-06-24 RX ADMIN — ASPIRIN 81 MG 81 MG: 81 TABLET ORAL at 07:38

## 2024-06-24 RX ADMIN — INSULIN GLARGINE 30 UNITS: 100 INJECTION, SOLUTION SUBCUTANEOUS at 20:23

## 2024-06-24 RX ADMIN — INSULIN LISPRO 8 UNITS: 100 INJECTION, SOLUTION INTRAVENOUS; SUBCUTANEOUS at 17:02

## 2024-06-24 RX ADMIN — CHLORHEXIDINE GLUCONATE 0.12% ORAL RINSE 15 ML: 1.2 LIQUID ORAL at 07:38

## 2024-06-24 RX ADMIN — SODIUM CHLORIDE, PRESERVATIVE FREE 10 ML: 5 INJECTION INTRAVENOUS at 07:39

## 2024-06-24 RX ADMIN — INSULIN LISPRO 4 UNITS: 100 INJECTION, SOLUTION INTRAVENOUS; SUBCUTANEOUS at 05:07

## 2024-06-24 RX ADMIN — LABETALOL HYDROCHLORIDE 10 MG: 5 INJECTION, SOLUTION INTRAVENOUS at 12:11

## 2024-06-24 RX ADMIN — LABETALOL HYDROCHLORIDE 10 MG: 5 INJECTION, SOLUTION INTRAVENOUS at 06:01

## 2024-06-24 RX ADMIN — FENTANYL CITRATE 50 MCG: 50 INJECTION, SOLUTION INTRAMUSCULAR; INTRAVENOUS at 09:25

## 2024-06-24 RX ADMIN — SODIUM CHLORIDE 3000 MG: 900 INJECTION INTRAVENOUS at 05:08

## 2024-06-24 RX ADMIN — DIGOXIN 125 MCG: 0.25 INJECTION INTRAMUSCULAR; INTRAVENOUS at 07:39

## 2024-06-24 RX ADMIN — FENTANYL CITRATE 50 MCG: 50 INJECTION, SOLUTION INTRAMUSCULAR; INTRAVENOUS at 15:22

## 2024-06-24 RX ADMIN — FENTANYL CITRATE 50 MCG: 50 INJECTION, SOLUTION INTRAMUSCULAR; INTRAVENOUS at 08:04

## 2024-06-24 RX ADMIN — METOPROLOL TARTRATE 25 MG: 25 TABLET, FILM COATED ORAL at 20:23

## 2024-06-24 RX ADMIN — FAMOTIDINE 20 MG: 20 TABLET, FILM COATED ORAL at 20:24

## 2024-06-24 RX ADMIN — CHLORHEXIDINE GLUCONATE 0.12% ORAL RINSE 15 ML: 1.2 LIQUID ORAL at 08:43

## 2024-06-24 RX ADMIN — INSULIN LISPRO 8 UNITS: 100 INJECTION, SOLUTION INTRAVENOUS; SUBCUTANEOUS at 11:24

## 2024-06-24 RX ADMIN — HEPARIN SODIUM 12 UNITS/KG/HR: 10000 INJECTION, SOLUTION INTRAVENOUS at 16:50

## 2024-06-24 RX ADMIN — CHLORHEXIDINE GLUCONATE 0.12% ORAL RINSE 15 ML: 1.2 LIQUID ORAL at 20:22

## 2024-06-24 RX ADMIN — CARBIDOPA AND LEVODOPA 1 TABLET: 25; 100 TABLET ORAL at 20:23

## 2024-06-24 RX ADMIN — SODIUM CHLORIDE 3000 MG: 900 INJECTION INTRAVENOUS at 11:26

## 2024-06-24 RX ADMIN — ATORVASTATIN CALCIUM 80 MG: 80 TABLET, FILM COATED ORAL at 20:23

## 2024-06-24 RX ADMIN — SODIUM CHLORIDE 3000 MG: 900 INJECTION INTRAVENOUS at 23:25

## 2024-06-24 RX ADMIN — FENTANYL CITRATE 50 MCG: 50 INJECTION, SOLUTION INTRAMUSCULAR; INTRAVENOUS at 02:04

## 2024-06-24 RX ADMIN — SODIUM CHLORIDE 3000 MG: 900 INJECTION INTRAVENOUS at 16:57

## 2024-06-24 RX ADMIN — FUROSEMIDE 20 MG: 20 TABLET ORAL at 07:39

## 2024-06-24 RX ADMIN — SODIUM CHLORIDE, PRESERVATIVE FREE 10 ML: 5 INJECTION INTRAVENOUS at 20:24

## 2024-06-24 RX ADMIN — FUROSEMIDE 20 MG: 20 TABLET ORAL at 16:58

## 2024-06-24 RX ADMIN — FAMOTIDINE 20 MG: 10 INJECTION, SOLUTION INTRAVENOUS at 07:38

## 2024-06-24 RX ADMIN — ACETAMINOPHEN 650 MG: 325 TABLET ORAL at 20:23

## 2024-06-24 ASSESSMENT — PULMONARY FUNCTION TESTS
PIF_VALUE: 26
PIF_VALUE: 20
PIF_VALUE: 12
PIF_VALUE: 12
PIF_VALUE: 20
PIF_VALUE: 20
PIF_VALUE: 12
PIF_VALUE: 21
PIF_VALUE: 13
PIF_VALUE: 21
PIF_VALUE: 23
PIF_VALUE: 22
PIF_VALUE: 21
PIF_VALUE: 17
PIF_VALUE: 21
PIF_VALUE: 12
PIF_VALUE: 20
PIF_VALUE: 13

## 2024-06-25 ENCOUNTER — APPOINTMENT (OUTPATIENT)
Dept: MRI IMAGING | Age: 74
DRG: 064 | End: 2024-06-25
Payer: MEDICARE

## 2024-06-25 PROBLEM — G93.40 ENCEPHALOPATHY: Status: ACTIVE | Noted: 2024-06-25

## 2024-06-25 LAB
ANION GAP SERPL CALCULATED.3IONS-SCNC: 9 MMOL/L (ref 9–16)
ANTI-XA UNFRAC HEPARIN: 0.18 IU/L
ANTI-XA UNFRAC HEPARIN: 0.38 IU/L
ANTI-XA UNFRAC HEPARIN: 0.46 IU/L
BUN SERPL-MCNC: 27 MG/DL (ref 8–23)
CALCIUM SERPL-MCNC: 9 MG/DL (ref 8.6–10.4)
CHLORIDE SERPL-SCNC: 107 MMOL/L (ref 98–107)
CO2 SERPL-SCNC: 28 MMOL/L (ref 20–31)
CREAT SERPL-MCNC: 0.8 MG/DL (ref 0.7–1.2)
DIGOXIN SERPL-MCNC: <0.3 NG/ML (ref 0.8–2)
ERYTHROCYTE [DISTWIDTH] IN BLOOD BY AUTOMATED COUNT: 15.1 % (ref 11.8–14.4)
FIO2: 30
GFR, ESTIMATED: >90 ML/MIN/1.73M2
GLUCOSE BLD-MCNC: 197 MG/DL (ref 75–110)
GLUCOSE BLD-MCNC: 215 MG/DL (ref 75–110)
GLUCOSE BLD-MCNC: 257 MG/DL (ref 74–100)
GLUCOSE SERPL-MCNC: 252 MG/DL (ref 74–99)
HCT VFR BLD AUTO: 37.5 % (ref 40.7–50.3)
HGB BLD-MCNC: 12.2 G/DL (ref 13–17)
MCH RBC QN AUTO: 30.4 PG (ref 25.2–33.5)
MCHC RBC AUTO-ENTMCNC: 32.5 G/DL (ref 28.4–34.8)
MCV RBC AUTO: 93.5 FL (ref 82.6–102.9)
MICROORGANISM SPEC CULT: NORMAL
MODE: ABNORMAL
NRBC BLD-RTO: 0 PER 100 WBC
O2 DELIVERY DEVICE: ABNORMAL
PLATELET # BLD AUTO: 185 K/UL (ref 138–453)
PMV BLD AUTO: 11.9 FL (ref 8.1–13.5)
POC HCO3: 28.9 MMOL/L (ref 21–28)
POC O2 SATURATION: 97.3 % (ref 94–98)
POC PCO2: 41.1 MM HG (ref 35–48)
POC PH: 7.46 (ref 7.35–7.45)
POC PO2: 89.7 MM HG (ref 83–108)
POSITIVE BASE EXCESS, ART: 4.5 MMOL/L (ref 0–3)
POTASSIUM SERPL-SCNC: 3.9 MMOL/L (ref 3.7–5.3)
RBC # BLD AUTO: 4.01 M/UL (ref 4.21–5.77)
SAMPLE SITE: ABNORMAL
SERVICE CMNT-IMP: NORMAL
SODIUM SERPL-SCNC: 144 MMOL/L (ref 136–145)
SPECIMEN DESCRIPTION: NORMAL
WBC OTHER # BLD: 9.2 K/UL (ref 3.5–11.3)

## 2024-06-25 PROCEDURE — 80048 BASIC METABOLIC PNL TOTAL CA: CPT

## 2024-06-25 PROCEDURE — 82803 BLOOD GASES ANY COMBINATION: CPT

## 2024-06-25 PROCEDURE — 85027 COMPLETE CBC AUTOMATED: CPT

## 2024-06-25 PROCEDURE — 6370000000 HC RX 637 (ALT 250 FOR IP)

## 2024-06-25 PROCEDURE — 85520 HEPARIN ASSAY: CPT

## 2024-06-25 PROCEDURE — 37799 UNLISTED PX VASCULAR SURGERY: CPT

## 2024-06-25 PROCEDURE — 70551 MRI BRAIN STEM W/O DYE: CPT

## 2024-06-25 PROCEDURE — 6370000000 HC RX 637 (ALT 250 FOR IP): Performed by: STUDENT IN AN ORGANIZED HEALTH CARE EDUCATION/TRAINING PROGRAM

## 2024-06-25 PROCEDURE — 80162 ASSAY OF DIGOXIN TOTAL: CPT

## 2024-06-25 PROCEDURE — 6360000002 HC RX W HCPCS: Performed by: PSYCHIATRY & NEUROLOGY

## 2024-06-25 PROCEDURE — 2580000003 HC RX 258: Performed by: PSYCHIATRY & NEUROLOGY

## 2024-06-25 PROCEDURE — 95714 VEEG EA 12-26 HR UNMNTR: CPT

## 2024-06-25 PROCEDURE — 2580000003 HC RX 258: Performed by: REGISTERED NURSE

## 2024-06-25 PROCEDURE — 95720 EEG PHY/QHP EA INCR W/VEEG: CPT | Performed by: PSYCHIATRY & NEUROLOGY

## 2024-06-25 PROCEDURE — 6360000002 HC RX W HCPCS

## 2024-06-25 PROCEDURE — 94761 N-INVAS EAR/PLS OXIMETRY MLT: CPT

## 2024-06-25 PROCEDURE — 94003 VENT MGMT INPAT SUBQ DAY: CPT

## 2024-06-25 PROCEDURE — 2000000000 HC ICU R&B

## 2024-06-25 PROCEDURE — 99291 CRITICAL CARE FIRST HOUR: CPT | Performed by: PSYCHIATRY & NEUROLOGY

## 2024-06-25 PROCEDURE — 6370000000 HC RX 637 (ALT 250 FOR IP): Performed by: REGISTERED NURSE

## 2024-06-25 PROCEDURE — 82947 ASSAY GLUCOSE BLOOD QUANT: CPT

## 2024-06-25 PROCEDURE — 2700000000 HC OXYGEN THERAPY PER DAY

## 2024-06-25 RX ORDER — MODAFINIL 100 MG/1
200 TABLET ORAL DAILY
Status: DISCONTINUED | OUTPATIENT
Start: 2024-06-26 | End: 2024-07-06

## 2024-06-25 RX ORDER — MODAFINIL 100 MG/1
200 TABLET ORAL DAILY
Status: DISCONTINUED | OUTPATIENT
Start: 2024-06-25 | End: 2024-06-25

## 2024-06-25 RX ADMIN — SODIUM CHLORIDE 3000 MG: 900 INJECTION INTRAVENOUS at 22:46

## 2024-06-25 RX ADMIN — HEPARIN SODIUM 14 UNITS/KG/HR: 10000 INJECTION, SOLUTION INTRAVENOUS at 11:37

## 2024-06-25 RX ADMIN — SODIUM CHLORIDE, PRESERVATIVE FREE 10 ML: 5 INJECTION INTRAVENOUS at 08:10

## 2024-06-25 RX ADMIN — INSULIN LISPRO 4 UNITS: 100 INJECTION, SOLUTION INTRAVENOUS; SUBCUTANEOUS at 11:50

## 2024-06-25 RX ADMIN — CARBIDOPA AND LEVODOPA 1 TABLET: 25; 100 TABLET ORAL at 08:10

## 2024-06-25 RX ADMIN — ASPIRIN 81 MG 81 MG: 81 TABLET ORAL at 08:10

## 2024-06-25 RX ADMIN — FAMOTIDINE 20 MG: 20 TABLET, FILM COATED ORAL at 08:10

## 2024-06-25 RX ADMIN — ATORVASTATIN CALCIUM 80 MG: 80 TABLET, FILM COATED ORAL at 20:10

## 2024-06-25 RX ADMIN — METOPROLOL TARTRATE 25 MG: 25 TABLET, FILM COATED ORAL at 08:10

## 2024-06-25 RX ADMIN — SODIUM CHLORIDE 3000 MG: 900 INJECTION INTRAVENOUS at 04:51

## 2024-06-25 RX ADMIN — FENTANYL CITRATE 50 MCG: 50 INJECTION, SOLUTION INTRAMUSCULAR; INTRAVENOUS at 11:41

## 2024-06-25 RX ADMIN — CARBIDOPA AND LEVODOPA 1 TABLET: 25; 100 TABLET ORAL at 20:10

## 2024-06-25 RX ADMIN — FUROSEMIDE 20 MG: 20 TABLET ORAL at 08:10

## 2024-06-25 RX ADMIN — CHLORHEXIDINE GLUCONATE 0.12% ORAL RINSE 15 ML: 1.2 LIQUID ORAL at 08:10

## 2024-06-25 RX ADMIN — INSULIN LISPRO 4 UNITS: 100 INJECTION, SOLUTION INTRAVENOUS; SUBCUTANEOUS at 04:47

## 2024-06-25 RX ADMIN — CARBIDOPA AND LEVODOPA 1 TABLET: 25; 100 TABLET ORAL at 15:18

## 2024-06-25 RX ADMIN — SODIUM CHLORIDE, PRESERVATIVE FREE 10 ML: 5 INJECTION INTRAVENOUS at 20:11

## 2024-06-25 RX ADMIN — SODIUM CHLORIDE 3000 MG: 900 INJECTION INTRAVENOUS at 18:25

## 2024-06-25 RX ADMIN — FUROSEMIDE 20 MG: 20 TABLET ORAL at 18:25

## 2024-06-25 RX ADMIN — FENTANYL CITRATE 50 MCG: 50 INJECTION, SOLUTION INTRAMUSCULAR; INTRAVENOUS at 01:45

## 2024-06-25 RX ADMIN — FAMOTIDINE 20 MG: 20 TABLET, FILM COATED ORAL at 20:10

## 2024-06-25 RX ADMIN — INSULIN GLARGINE 30 UNITS: 100 INJECTION, SOLUTION SUBCUTANEOUS at 22:34

## 2024-06-25 RX ADMIN — DIGOXIN 125 MCG: 125 TABLET ORAL at 08:10

## 2024-06-25 RX ADMIN — SODIUM CHLORIDE 3000 MG: 900 INJECTION INTRAVENOUS at 11:11

## 2024-06-25 RX ADMIN — METOPROLOL TARTRATE 25 MG: 25 TABLET, FILM COATED ORAL at 20:10

## 2024-06-25 RX ADMIN — INSULIN LISPRO 4 UNITS: 100 INJECTION, SOLUTION INTRAVENOUS; SUBCUTANEOUS at 22:35

## 2024-06-25 RX ADMIN — CHLORHEXIDINE GLUCONATE 0.12% ORAL RINSE 15 ML: 1.2 LIQUID ORAL at 20:11

## 2024-06-25 RX ADMIN — MODAFINIL 200 MG: 100 TABLET ORAL at 11:06

## 2024-06-25 ASSESSMENT — PULMONARY FUNCTION TESTS
PIF_VALUE: 12
PIF_VALUE: 2
PIF_VALUE: 12
PIF_VALUE: 21
PIF_VALUE: 12
PIF_VALUE: 22
PIF_VALUE: 12
PIF_VALUE: 19
PIF_VALUE: 20
PIF_VALUE: 12

## 2024-06-25 NOTE — CARE COORDINATION
Patient remains intubated and sedated.  No famiy present.  RN states wife should be here later today.     1646 No family at bedside.  Will contact the family in the AM to discuss transition plan.   Patient is not

## 2024-06-26 LAB
ALBUMIN PERCENT: 49 % (ref 45–65)
ALBUMIN SERPL-MCNC: 2.8 G/DL (ref 3.2–5.2)
ALPHA 2 PERCENT: 14 % (ref 6–13)
ALPHA1 GLOB SERPL ELPH-MCNC: 0.3 G/DL (ref 0.1–0.4)
ALPHA1 GLOB SERPL ELPH-MCNC: 6 % (ref 3–6)
ALPHA2 GLOB SERPL ELPH-MCNC: 0.8 G/DL (ref 0.5–0.9)
ANION GAP SERPL CALCULATED.3IONS-SCNC: 7 MMOL/L (ref 9–16)
ANTI-XA UNFRAC HEPARIN: 0.5 IU/L
B-GLOBULIN SERPL ELPH-MCNC: 0.8 G/DL (ref 0.5–1.1)
B-GLOBULIN SERPL ELPH-MCNC: 13 % (ref 11–19)
BUN SERPL-MCNC: 25 MG/DL (ref 8–23)
CALCIUM SERPL-MCNC: 8.9 MG/DL (ref 8.6–10.4)
CHLORIDE SERPL-SCNC: 104 MMOL/L (ref 98–107)
CO2 SERPL-SCNC: 30 MMOL/L (ref 20–31)
CREAT SERPL-MCNC: 0.8 MG/DL (ref 0.7–1.2)
ERYTHROCYTE [DISTWIDTH] IN BLOOD BY AUTOMATED COUNT: 14.8 % (ref 11.8–14.4)
GAMMA GLOB SERPL ELPH-MCNC: 1 G/DL (ref 0.5–1.5)
GAMMA GLOBULIN %: 18 % (ref 9–20)
GFR, ESTIMATED: >90 ML/MIN/1.73M2
GLUCOSE BLD-MCNC: 188 MG/DL (ref 75–110)
GLUCOSE BLD-MCNC: 199 MG/DL (ref 75–110)
GLUCOSE BLD-MCNC: 201 MG/DL (ref 75–110)
GLUCOSE BLD-MCNC: 202 MG/DL (ref 75–110)
GLUCOSE BLD-MCNC: 215 MG/DL (ref 75–110)
GLUCOSE SERPL-MCNC: 215 MG/DL (ref 74–99)
HCT VFR BLD AUTO: 36.4 % (ref 40.7–50.3)
HGB BLD-MCNC: 12 G/DL (ref 13–17)
MCH RBC QN AUTO: 30.5 PG (ref 25.2–33.5)
MCHC RBC AUTO-ENTMCNC: 33 G/DL (ref 28.4–34.8)
MCV RBC AUTO: 92.6 FL (ref 82.6–102.9)
MICROORGANISM SPEC CULT: NORMAL
NRBC BLD-RTO: 0 PER 100 WBC
PATHOLOGIST: ABNORMAL
PLATELET # BLD AUTO: 188 K/UL (ref 138–453)
PMV BLD AUTO: 12.1 FL (ref 8.1–13.5)
POTASSIUM SERPL-SCNC: 3.8 MMOL/L (ref 3.7–5.3)
PROT PATTERN SERPL ELPH-IMP: ABNORMAL
PROT SERPL-MCNC: 5.7 G/DL (ref 6.6–8.7)
RBC # BLD AUTO: 3.93 M/UL (ref 4.21–5.77)
SERVICE CMNT-IMP: NORMAL
SODIUM SERPL-SCNC: 141 MMOL/L (ref 136–145)
SPECIMEN DESCRIPTION: NORMAL
TOTAL PROT. SUM,%: 100 % (ref 98–102)
TOTAL PROT. SUM: 5.7 G/DL (ref 6.3–8.2)
WBC OTHER # BLD: 10.1 K/UL (ref 3.5–11.3)

## 2024-06-26 PROCEDURE — 94003 VENT MGMT INPAT SUBQ DAY: CPT

## 2024-06-26 PROCEDURE — 2580000003 HC RX 258: Performed by: REGISTERED NURSE

## 2024-06-26 PROCEDURE — 95718 EEG PHYS/QHP 2-12 HR W/VEEG: CPT | Performed by: PSYCHIATRY & NEUROLOGY

## 2024-06-26 PROCEDURE — 2000000000 HC ICU R&B

## 2024-06-26 PROCEDURE — 6360000002 HC RX W HCPCS

## 2024-06-26 PROCEDURE — 2700000000 HC OXYGEN THERAPY PER DAY

## 2024-06-26 PROCEDURE — 6370000000 HC RX 637 (ALT 250 FOR IP): Performed by: REGISTERED NURSE

## 2024-06-26 PROCEDURE — 2580000003 HC RX 258: Performed by: PSYCHIATRY & NEUROLOGY

## 2024-06-26 PROCEDURE — 6360000002 HC RX W HCPCS: Performed by: PSYCHIATRY & NEUROLOGY

## 2024-06-26 PROCEDURE — 6370000000 HC RX 637 (ALT 250 FOR IP)

## 2024-06-26 PROCEDURE — 95714 VEEG EA 12-26 HR UNMNTR: CPT

## 2024-06-26 PROCEDURE — 85520 HEPARIN ASSAY: CPT

## 2024-06-26 PROCEDURE — 99291 CRITICAL CARE FIRST HOUR: CPT | Performed by: PSYCHIATRY & NEUROLOGY

## 2024-06-26 PROCEDURE — 94761 N-INVAS EAR/PLS OXIMETRY MLT: CPT

## 2024-06-26 PROCEDURE — 6370000000 HC RX 637 (ALT 250 FOR IP): Performed by: STUDENT IN AN ORGANIZED HEALTH CARE EDUCATION/TRAINING PROGRAM

## 2024-06-26 PROCEDURE — 36415 COLL VENOUS BLD VENIPUNCTURE: CPT

## 2024-06-26 PROCEDURE — 95711 VEEG 2-12 HR UNMONITORED: CPT

## 2024-06-26 PROCEDURE — 80048 BASIC METABOLIC PNL TOTAL CA: CPT

## 2024-06-26 PROCEDURE — 6370000000 HC RX 637 (ALT 250 FOR IP): Performed by: PSYCHIATRY & NEUROLOGY

## 2024-06-26 PROCEDURE — 85027 COMPLETE CBC AUTOMATED: CPT

## 2024-06-26 RX ORDER — INSULIN GLARGINE 100 [IU]/ML
36 INJECTION, SOLUTION SUBCUTANEOUS NIGHTLY
Status: DISCONTINUED | OUTPATIENT
Start: 2024-06-26 | End: 2024-07-04

## 2024-06-26 RX ADMIN — METOPROLOL TARTRATE 25 MG: 25 TABLET, FILM COATED ORAL at 10:03

## 2024-06-26 RX ADMIN — CHLORHEXIDINE GLUCONATE 0.12% ORAL RINSE 15 ML: 1.2 LIQUID ORAL at 21:55

## 2024-06-26 RX ADMIN — FAMOTIDINE 20 MG: 20 TABLET, FILM COATED ORAL at 21:54

## 2024-06-26 RX ADMIN — FENTANYL CITRATE 50 MCG: 50 INJECTION, SOLUTION INTRAMUSCULAR; INTRAVENOUS at 02:51

## 2024-06-26 RX ADMIN — INSULIN LISPRO 4 UNITS: 100 INJECTION, SOLUTION INTRAVENOUS; SUBCUTANEOUS at 05:24

## 2024-06-26 RX ADMIN — MODAFINIL 200 MG: 100 TABLET ORAL at 10:03

## 2024-06-26 RX ADMIN — SODIUM CHLORIDE, PRESERVATIVE FREE 10 ML: 5 INJECTION INTRAVENOUS at 21:56

## 2024-06-26 RX ADMIN — INSULIN LISPRO 4 UNITS: 100 INJECTION, SOLUTION INTRAVENOUS; SUBCUTANEOUS at 23:18

## 2024-06-26 RX ADMIN — FAMOTIDINE 20 MG: 20 TABLET, FILM COATED ORAL at 10:03

## 2024-06-26 RX ADMIN — DIGOXIN 125 MCG: 125 TABLET ORAL at 10:04

## 2024-06-26 RX ADMIN — SODIUM CHLORIDE, PRESERVATIVE FREE 10 ML: 5 INJECTION INTRAVENOUS at 10:05

## 2024-06-26 RX ADMIN — ACETAMINOPHEN 650 MG: 325 TABLET ORAL at 17:43

## 2024-06-26 RX ADMIN — FUROSEMIDE 20 MG: 20 TABLET ORAL at 10:04

## 2024-06-26 RX ADMIN — HEPARIN SODIUM 14 UNITS/KG/HR: 10000 INJECTION, SOLUTION INTRAVENOUS at 02:43

## 2024-06-26 RX ADMIN — METOPROLOL TARTRATE 25 MG: 25 TABLET, FILM COATED ORAL at 21:54

## 2024-06-26 RX ADMIN — SODIUM CHLORIDE 3000 MG: 900 INJECTION INTRAVENOUS at 23:18

## 2024-06-26 RX ADMIN — CARBIDOPA AND LEVODOPA 2 TABLET: 25; 100 TABLET ORAL at 15:27

## 2024-06-26 RX ADMIN — SODIUM CHLORIDE 3000 MG: 900 INJECTION INTRAVENOUS at 05:16

## 2024-06-26 RX ADMIN — INSULIN GLARGINE 36 UNITS: 100 INJECTION, SOLUTION SUBCUTANEOUS at 21:55

## 2024-06-26 RX ADMIN — CHLORHEXIDINE GLUCONATE 0.12% ORAL RINSE 15 ML: 1.2 LIQUID ORAL at 10:03

## 2024-06-26 RX ADMIN — CARBIDOPA AND LEVODOPA 1 TABLET: 25; 100 TABLET ORAL at 10:03

## 2024-06-26 RX ADMIN — SODIUM CHLORIDE 3000 MG: 900 INJECTION INTRAVENOUS at 10:17

## 2024-06-26 RX ADMIN — ATORVASTATIN CALCIUM 80 MG: 80 TABLET, FILM COATED ORAL at 21:54

## 2024-06-26 RX ADMIN — ASPIRIN 81 MG 81 MG: 81 TABLET ORAL at 10:03

## 2024-06-26 RX ADMIN — CARBIDOPA AND LEVODOPA 2 TABLET: 25; 100 TABLET ORAL at 21:54

## 2024-06-26 RX ADMIN — FUROSEMIDE 20 MG: 20 TABLET ORAL at 17:43

## 2024-06-26 RX ADMIN — SODIUM CHLORIDE 3000 MG: 900 INJECTION INTRAVENOUS at 17:37

## 2024-06-26 ASSESSMENT — PULMONARY FUNCTION TESTS
PIF_VALUE: 12
PIF_VALUE: 13
PIF_VALUE: 12
PIF_VALUE: 13
PIF_VALUE: 20
PIF_VALUE: 12
PIF_VALUE: 14
PIF_VALUE: 12

## 2024-06-26 ASSESSMENT — PAIN SCALES - GENERAL
PAINLEVEL_OUTOF10: 0

## 2024-06-26 NOTE — CARE COORDINATION
Called patients brother Ray.  We have discussed LTACH options.  He tells me he has no preference, but the patient has a very close friend in [place] and he thinks that [facility] is the best choice.   Referral placed.    Raudel asks if I could return call later to discuss other options for ARU an SNF

## 2024-06-27 ENCOUNTER — APPOINTMENT (OUTPATIENT)
Dept: CT IMAGING | Age: 74
DRG: 064 | End: 2024-06-27
Payer: MEDICARE

## 2024-06-27 LAB
ANION GAP SERPL CALCULATED.3IONS-SCNC: 6 MMOL/L (ref 9–16)
ANTI-XA UNFRAC HEPARIN: <0.1 IU/L
BUN SERPL-MCNC: 26 MG/DL (ref 8–23)
CALCIUM SERPL-MCNC: 8.7 MG/DL (ref 8.6–10.4)
CHLORIDE SERPL-SCNC: 102 MMOL/L (ref 98–107)
CK SERPL-CCNC: 278 U/L (ref 39–308)
CO2 SERPL-SCNC: 33 MMOL/L (ref 20–31)
CREAT SERPL-MCNC: 0.8 MG/DL (ref 0.7–1.2)
ERYTHROCYTE [DISTWIDTH] IN BLOOD BY AUTOMATED COUNT: 14.8 % (ref 11.8–14.4)
GFR, ESTIMATED: >90 ML/MIN/1.73M2
GLUCOSE BLD-MCNC: 159 MG/DL (ref 75–110)
GLUCOSE BLD-MCNC: 183 MG/DL (ref 75–110)
GLUCOSE SERPL-MCNC: 150 MG/DL (ref 74–99)
HCT VFR BLD AUTO: 36.1 % (ref 40.7–50.3)
HGB BLD-MCNC: 11.5 G/DL (ref 13–17)
INR PPP: 1.3
MCH RBC QN AUTO: 30.2 PG (ref 25.2–33.5)
MCHC RBC AUTO-ENTMCNC: 31.9 G/DL (ref 28.4–34.8)
MCV RBC AUTO: 94.8 FL (ref 82.6–102.9)
NRBC BLD-RTO: 0 PER 100 WBC
PARTIAL THROMBOPLASTIN TIME: 25.5 SEC (ref 23–36.5)
PLATELET # BLD AUTO: 185 K/UL (ref 138–453)
PMV BLD AUTO: 12.2 FL (ref 8.1–13.5)
POTASSIUM SERPL-SCNC: 3.7 MMOL/L (ref 3.7–5.3)
PROTHROMBIN TIME: 15.6 SEC (ref 11.7–14.9)
RBC # BLD AUTO: 3.81 M/UL (ref 4.21–5.77)
SODIUM SERPL-SCNC: 141 MMOL/L (ref 136–145)
WBC OTHER # BLD: 10.2 K/UL (ref 3.5–11.3)

## 2024-06-27 PROCEDURE — 2580000003 HC RX 258: Performed by: PSYCHIATRY & NEUROLOGY

## 2024-06-27 PROCEDURE — 94003 VENT MGMT INPAT SUBQ DAY: CPT

## 2024-06-27 PROCEDURE — 85520 HEPARIN ASSAY: CPT

## 2024-06-27 PROCEDURE — 6370000000 HC RX 637 (ALT 250 FOR IP): Performed by: REGISTERED NURSE

## 2024-06-27 PROCEDURE — 85027 COMPLETE CBC AUTOMATED: CPT

## 2024-06-27 PROCEDURE — 85730 THROMBOPLASTIN TIME PARTIAL: CPT

## 2024-06-27 PROCEDURE — 70450 CT HEAD/BRAIN W/O DYE: CPT

## 2024-06-27 PROCEDURE — 85610 PROTHROMBIN TIME: CPT

## 2024-06-27 PROCEDURE — 82550 ASSAY OF CK (CPK): CPT

## 2024-06-27 PROCEDURE — 6370000000 HC RX 637 (ALT 250 FOR IP)

## 2024-06-27 PROCEDURE — 80048 BASIC METABOLIC PNL TOTAL CA: CPT

## 2024-06-27 PROCEDURE — 6360000002 HC RX W HCPCS: Performed by: PSYCHIATRY & NEUROLOGY

## 2024-06-27 PROCEDURE — 2700000000 HC OXYGEN THERAPY PER DAY

## 2024-06-27 PROCEDURE — 6360000002 HC RX W HCPCS

## 2024-06-27 PROCEDURE — 94761 N-INVAS EAR/PLS OXIMETRY MLT: CPT

## 2024-06-27 PROCEDURE — 82947 ASSAY GLUCOSE BLOOD QUANT: CPT

## 2024-06-27 PROCEDURE — 2580000003 HC RX 258: Performed by: REGISTERED NURSE

## 2024-06-27 PROCEDURE — 6370000000 HC RX 637 (ALT 250 FOR IP): Performed by: STUDENT IN AN ORGANIZED HEALTH CARE EDUCATION/TRAINING PROGRAM

## 2024-06-27 PROCEDURE — 36415 COLL VENOUS BLD VENIPUNCTURE: CPT

## 2024-06-27 PROCEDURE — 99291 CRITICAL CARE FIRST HOUR: CPT | Performed by: PSYCHIATRY & NEUROLOGY

## 2024-06-27 PROCEDURE — 6370000000 HC RX 637 (ALT 250 FOR IP): Performed by: PSYCHIATRY & NEUROLOGY

## 2024-06-27 PROCEDURE — 2000000000 HC ICU R&B

## 2024-06-27 RX ORDER — ENOXAPARIN SODIUM 100 MG/ML
30 INJECTION SUBCUTANEOUS 2 TIMES DAILY
Status: DISCONTINUED | OUTPATIENT
Start: 2024-06-28 | End: 2024-06-30

## 2024-06-27 RX ADMIN — SODIUM CHLORIDE, PRESERVATIVE FREE 5 ML: 5 INJECTION INTRAVENOUS at 08:13

## 2024-06-27 RX ADMIN — METOPROLOL TARTRATE 25 MG: 25 TABLET, FILM COATED ORAL at 20:35

## 2024-06-27 RX ADMIN — FAMOTIDINE 20 MG: 20 TABLET, FILM COATED ORAL at 20:35

## 2024-06-27 RX ADMIN — SODIUM CHLORIDE, PRESERVATIVE FREE 10 ML: 5 INJECTION INTRAVENOUS at 20:15

## 2024-06-27 RX ADMIN — CARBIDOPA AND LEVODOPA 3 TABLET: 25; 100 TABLET ORAL at 14:19

## 2024-06-27 RX ADMIN — CHLORHEXIDINE GLUCONATE 0.12% ORAL RINSE 15 ML: 1.2 LIQUID ORAL at 20:15

## 2024-06-27 RX ADMIN — INSULIN GLARGINE 36 UNITS: 100 INJECTION, SOLUTION SUBCUTANEOUS at 20:35

## 2024-06-27 RX ADMIN — FENTANYL CITRATE 50 MCG: 50 INJECTION, SOLUTION INTRAMUSCULAR; INTRAVENOUS at 22:48

## 2024-06-27 RX ADMIN — DIGOXIN 125 MCG: 125 TABLET ORAL at 08:13

## 2024-06-27 RX ADMIN — CARBIDOPA AND LEVODOPA 3 TABLET: 25; 100 TABLET ORAL at 20:35

## 2024-06-27 RX ADMIN — CARBIDOPA AND LEVODOPA 2 TABLET: 25; 100 TABLET ORAL at 08:14

## 2024-06-27 RX ADMIN — METOPROLOL TARTRATE 25 MG: 25 TABLET, FILM COATED ORAL at 08:14

## 2024-06-27 RX ADMIN — ASPIRIN 81 MG 81 MG: 81 TABLET ORAL at 08:14

## 2024-06-27 RX ADMIN — SODIUM CHLORIDE 3000 MG: 900 INJECTION INTRAVENOUS at 11:22

## 2024-06-27 RX ADMIN — FUROSEMIDE 20 MG: 20 TABLET ORAL at 18:19

## 2024-06-27 RX ADMIN — CHLORHEXIDINE GLUCONATE 0.12% ORAL RINSE 15 ML: 1.2 LIQUID ORAL at 08:15

## 2024-06-27 RX ADMIN — FUROSEMIDE 20 MG: 20 TABLET ORAL at 08:13

## 2024-06-27 RX ADMIN — MODAFINIL 200 MG: 100 TABLET ORAL at 08:13

## 2024-06-27 RX ADMIN — SODIUM CHLORIDE: 9 INJECTION, SOLUTION INTRAVENOUS at 04:33

## 2024-06-27 RX ADMIN — SODIUM CHLORIDE 3000 MG: 900 INJECTION INTRAVENOUS at 04:35

## 2024-06-27 RX ADMIN — ATORVASTATIN CALCIUM 80 MG: 80 TABLET, FILM COATED ORAL at 20:35

## 2024-06-27 RX ADMIN — FAMOTIDINE 20 MG: 20 TABLET, FILM COATED ORAL at 08:14

## 2024-06-27 ASSESSMENT — PULMONARY FUNCTION TESTS
PIF_VALUE: 12
PIF_VALUE: 53
PIF_VALUE: 21
PIF_VALUE: 25
PIF_VALUE: 22
PIF_VALUE: 12
PIF_VALUE: 28
PIF_VALUE: 30
PIF_VALUE: 28
PIF_VALUE: 12
PIF_VALUE: 12
PIF_VALUE: 30
PIF_VALUE: 12
PIF_VALUE: 27
PIF_VALUE: 12
PIF_VALUE: 21
PIF_VALUE: 26
PIF_VALUE: 12
PIF_VALUE: 17
PIF_VALUE: 28
PIF_VALUE: 27
PIF_VALUE: 24
PIF_VALUE: 22
PIF_VALUE: 12
PIF_VALUE: 26

## 2024-06-27 ASSESSMENT — PAIN SCALES - GENERAL: PAINLEVEL_OUTOF10: 0

## 2024-06-28 LAB
ANION GAP SERPL CALCULATED.3IONS-SCNC: 7 MMOL/L (ref 9–16)
ANTI-XA UNFRAC HEPARIN: <0.1 IU/L
BUN SERPL-MCNC: 24 MG/DL (ref 8–23)
CALCIUM SERPL-MCNC: 8.3 MG/DL (ref 8.6–10.4)
CHLORIDE SERPL-SCNC: 102 MMOL/L (ref 98–107)
CO2 SERPL-SCNC: 27 MMOL/L (ref 20–31)
CREAT SERPL-MCNC: 0.8 MG/DL (ref 0.7–1.2)
ERYTHROCYTE [DISTWIDTH] IN BLOOD BY AUTOMATED COUNT: 14.6 % (ref 11.8–14.4)
GFR, ESTIMATED: >90 ML/MIN/1.73M2
GLUCOSE BLD-MCNC: 165 MG/DL (ref 75–110)
GLUCOSE BLD-MCNC: 174 MG/DL (ref 75–110)
GLUCOSE BLD-MCNC: 178 MG/DL (ref 75–110)
GLUCOSE BLD-MCNC: 184 MG/DL (ref 75–110)
GLUCOSE BLD-MCNC: 188 MG/DL (ref 75–110)
GLUCOSE BLD-MCNC: 212 MG/DL (ref 75–110)
GLUCOSE SERPL-MCNC: 188 MG/DL (ref 74–99)
HCT VFR BLD AUTO: 37 % (ref 40.7–50.3)
HGB BLD-MCNC: 12.1 G/DL (ref 13–17)
MCH RBC QN AUTO: 31 PG (ref 25.2–33.5)
MCHC RBC AUTO-ENTMCNC: 32.7 G/DL (ref 28.4–34.8)
MCV RBC AUTO: 94.9 FL (ref 82.6–102.9)
NRBC BLD-RTO: 0 PER 100 WBC
PLATELET # BLD AUTO: 217 K/UL (ref 138–453)
PMV BLD AUTO: 12 FL (ref 8.1–13.5)
POTASSIUM SERPL-SCNC: 3.8 MMOL/L (ref 3.7–5.3)
RBC # BLD AUTO: 3.9 M/UL (ref 4.21–5.77)
SODIUM SERPL-SCNC: 136 MMOL/L (ref 136–145)
TROPONIN I SERPL HS-MCNC: 57 NG/L (ref 0–22)
WBC OTHER # BLD: 11.2 K/UL (ref 3.5–11.3)

## 2024-06-28 PROCEDURE — 94003 VENT MGMT INPAT SUBQ DAY: CPT

## 2024-06-28 PROCEDURE — 6370000000 HC RX 637 (ALT 250 FOR IP)

## 2024-06-28 PROCEDURE — 6370000000 HC RX 637 (ALT 250 FOR IP): Performed by: REGISTERED NURSE

## 2024-06-28 PROCEDURE — 84484 ASSAY OF TROPONIN QUANT: CPT

## 2024-06-28 PROCEDURE — 6370000000 HC RX 637 (ALT 250 FOR IP): Performed by: STUDENT IN AN ORGANIZED HEALTH CARE EDUCATION/TRAINING PROGRAM

## 2024-06-28 PROCEDURE — 93005 ELECTROCARDIOGRAM TRACING: CPT | Performed by: STUDENT IN AN ORGANIZED HEALTH CARE EDUCATION/TRAINING PROGRAM

## 2024-06-28 PROCEDURE — 2580000003 HC RX 258: Performed by: REGISTERED NURSE

## 2024-06-28 PROCEDURE — 36415 COLL VENOUS BLD VENIPUNCTURE: CPT

## 2024-06-28 PROCEDURE — 2500000003 HC RX 250 WO HCPCS: Performed by: STUDENT IN AN ORGANIZED HEALTH CARE EDUCATION/TRAINING PROGRAM

## 2024-06-28 PROCEDURE — 6370000000 HC RX 637 (ALT 250 FOR IP): Performed by: PSYCHIATRY & NEUROLOGY

## 2024-06-28 PROCEDURE — 6360000002 HC RX W HCPCS

## 2024-06-28 PROCEDURE — 82947 ASSAY GLUCOSE BLOOD QUANT: CPT

## 2024-06-28 PROCEDURE — 6360000002 HC RX W HCPCS: Performed by: STUDENT IN AN ORGANIZED HEALTH CARE EDUCATION/TRAINING PROGRAM

## 2024-06-28 PROCEDURE — 94761 N-INVAS EAR/PLS OXIMETRY MLT: CPT

## 2024-06-28 PROCEDURE — 2700000000 HC OXYGEN THERAPY PER DAY

## 2024-06-28 PROCEDURE — 2580000003 HC RX 258: Performed by: PSYCHIATRY & NEUROLOGY

## 2024-06-28 PROCEDURE — 99291 CRITICAL CARE FIRST HOUR: CPT | Performed by: PSYCHIATRY & NEUROLOGY

## 2024-06-28 PROCEDURE — 85027 COMPLETE CBC AUTOMATED: CPT

## 2024-06-28 PROCEDURE — 2000000000 HC ICU R&B

## 2024-06-28 PROCEDURE — 85520 HEPARIN ASSAY: CPT

## 2024-06-28 PROCEDURE — 80048 BASIC METABOLIC PNL TOTAL CA: CPT

## 2024-06-28 RX ORDER — MAGNESIUM SULFATE IN WATER 40 MG/ML
2000 INJECTION, SOLUTION INTRAVENOUS ONCE
Status: COMPLETED | OUTPATIENT
Start: 2024-06-28 | End: 2024-06-29

## 2024-06-28 RX ORDER — CALCIUM GLUCONATE 20 MG/ML
2000 INJECTION, SOLUTION INTRAVENOUS ONCE
Status: COMPLETED | OUTPATIENT
Start: 2024-06-29 | End: 2024-06-29

## 2024-06-28 RX ADMIN — FUROSEMIDE 20 MG: 20 TABLET ORAL at 07:54

## 2024-06-28 RX ADMIN — ASPIRIN 81 MG 81 MG: 81 TABLET ORAL at 07:54

## 2024-06-28 RX ADMIN — INSULIN LISPRO 4 UNITS: 100 INJECTION, SOLUTION INTRAVENOUS; SUBCUTANEOUS at 23:21

## 2024-06-28 RX ADMIN — ENOXAPARIN SODIUM 30 MG: 100 INJECTION SUBCUTANEOUS at 20:06

## 2024-06-28 RX ADMIN — CARBIDOPA AND LEVODOPA 3 TABLET: 25; 100 TABLET ORAL at 20:06

## 2024-06-28 RX ADMIN — FAMOTIDINE 20 MG: 20 TABLET, FILM COATED ORAL at 07:54

## 2024-06-28 RX ADMIN — CHLORHEXIDINE GLUCONATE 0.12% ORAL RINSE 15 ML: 1.2 LIQUID ORAL at 20:06

## 2024-06-28 RX ADMIN — CARBIDOPA AND LEVODOPA 3 TABLET: 25; 100 TABLET ORAL at 07:54

## 2024-06-28 RX ADMIN — FAMOTIDINE 20 MG: 20 TABLET, FILM COATED ORAL at 20:06

## 2024-06-28 RX ADMIN — INSULIN GLARGINE 36 UNITS: 100 INJECTION, SOLUTION SUBCUTANEOUS at 20:06

## 2024-06-28 RX ADMIN — METOPROLOL TARTRATE 25 MG: 25 TABLET, FILM COATED ORAL at 20:06

## 2024-06-28 RX ADMIN — METOPROLOL TARTRATE 25 MG: 25 TABLET, FILM COATED ORAL at 07:54

## 2024-06-28 RX ADMIN — CALCIUM GLUCONATE 2000 MG: 20 INJECTION, SOLUTION INTRAVENOUS at 23:42

## 2024-06-28 RX ADMIN — SODIUM CHLORIDE: 9 INJECTION, SOLUTION INTRAVENOUS at 20:09

## 2024-06-28 RX ADMIN — MAGNESIUM SULFATE HEPTAHYDRATE 2000 MG: 40 INJECTION, SOLUTION INTRAVENOUS at 23:35

## 2024-06-28 RX ADMIN — FUROSEMIDE 20 MG: 20 TABLET ORAL at 17:09

## 2024-06-28 RX ADMIN — CHLORHEXIDINE GLUCONATE 0.12% ORAL RINSE 15 ML: 1.2 LIQUID ORAL at 07:54

## 2024-06-28 RX ADMIN — ENOXAPARIN SODIUM 30 MG: 100 INJECTION SUBCUTANEOUS at 07:54

## 2024-06-28 RX ADMIN — SODIUM CHLORIDE, PRESERVATIVE FREE 10 ML: 5 INJECTION INTRAVENOUS at 20:06

## 2024-06-28 RX ADMIN — SODIUM CHLORIDE, PRESERVATIVE FREE 5 ML: 5 INJECTION INTRAVENOUS at 07:56

## 2024-06-28 RX ADMIN — ATORVASTATIN CALCIUM 80 MG: 80 TABLET, FILM COATED ORAL at 20:06

## 2024-06-28 RX ADMIN — DIGOXIN 125 MCG: 125 TABLET ORAL at 07:54

## 2024-06-28 RX ADMIN — MODAFINIL 200 MG: 100 TABLET ORAL at 07:54

## 2024-06-28 RX ADMIN — CARBIDOPA AND LEVODOPA 3 TABLET: 25; 100 TABLET ORAL at 15:24

## 2024-06-28 ASSESSMENT — PULMONARY FUNCTION TESTS
PIF_VALUE: 24
PIF_VALUE: 12
PIF_VALUE: 23
PIF_VALUE: 12
PIF_VALUE: 24
PIF_VALUE: 28
PIF_VALUE: 12
PIF_VALUE: 30
PIF_VALUE: 28
PIF_VALUE: 12
PIF_VALUE: 21
PIF_VALUE: 22

## 2024-06-28 ASSESSMENT — PAIN SCALES - GENERAL: PAINLEVEL_OUTOF10: 0

## 2024-06-29 LAB
ALLEN TEST: POSITIVE
ANION GAP SERPL CALCULATED.3IONS-SCNC: 9 MMOL/L (ref 9–16)
ANTI-XA UNFRAC HEPARIN: 0.15 IU/L
BUN SERPL-MCNC: 22 MG/DL (ref 8–23)
CALCIUM SERPL-MCNC: 8.3 MG/DL (ref 8.6–10.4)
CHLORIDE SERPL-SCNC: 100 MMOL/L (ref 98–107)
CO2 SERPL-SCNC: 25 MMOL/L (ref 20–31)
CREAT SERPL-MCNC: 0.8 MG/DL (ref 0.7–1.2)
DIGOXIN SERPL-MCNC: 0.7 NG/ML (ref 0.8–2)
ERYTHROCYTE [DISTWIDTH] IN BLOOD BY AUTOMATED COUNT: 14.4 % (ref 11.8–14.4)
FIO2: 30
GFR, ESTIMATED: >90 ML/MIN/1.73M2
GLUCOSE BLD-MCNC: 164 MG/DL (ref 75–110)
GLUCOSE BLD-MCNC: 165 MG/DL (ref 74–100)
GLUCOSE BLD-MCNC: 184 MG/DL (ref 75–110)
GLUCOSE SERPL-MCNC: 198 MG/DL (ref 74–99)
HCO3 VENOUS: 30.8 MMOL/L (ref 22–29)
HCT VFR BLD AUTO: 37.8 % (ref 40.7–50.3)
HGB BLD-MCNC: 12.3 G/DL (ref 13–17)
MCH RBC QN AUTO: 30.4 PG (ref 25.2–33.5)
MCHC RBC AUTO-ENTMCNC: 32.5 G/DL (ref 28.4–34.8)
MCV RBC AUTO: 93.3 FL (ref 82.6–102.9)
NRBC BLD-RTO: 0 PER 100 WBC
O2 DELIVERY DEVICE: ABNORMAL
O2 SAT, VEN: 69.9 % (ref 60–85)
PCO2 VENOUS: 44 MM HG (ref 41–51)
PH VENOUS: 7.45 (ref 7.32–7.43)
PLATELET # BLD AUTO: 192 K/UL (ref 138–453)
PMV BLD AUTO: 12.1 FL (ref 8.1–13.5)
PO2 VENOUS: 35.1 MM HG (ref 30–50)
POC LACTIC ACID: 1.1 MMOL/L (ref 0.56–1.39)
POSITIVE BASE EXCESS, VEN: 6.1 MMOL/L (ref 0–3)
POTASSIUM SERPL-SCNC: 4.1 MMOL/L (ref 3.7–5.3)
RBC # BLD AUTO: 4.05 M/UL (ref 4.21–5.77)
SAMPLE SITE: ABNORMAL
SODIUM SERPL-SCNC: 134 MMOL/L (ref 136–145)
TROPONIN I SERPL HS-MCNC: 60 NG/L (ref 0–22)
WBC OTHER # BLD: 11.2 K/UL (ref 3.5–11.3)

## 2024-06-29 PROCEDURE — 6370000000 HC RX 637 (ALT 250 FOR IP): Performed by: REGISTERED NURSE

## 2024-06-29 PROCEDURE — 85520 HEPARIN ASSAY: CPT

## 2024-06-29 PROCEDURE — 36415 COLL VENOUS BLD VENIPUNCTURE: CPT

## 2024-06-29 PROCEDURE — 6370000000 HC RX 637 (ALT 250 FOR IP): Performed by: PSYCHIATRY & NEUROLOGY

## 2024-06-29 PROCEDURE — 84484 ASSAY OF TROPONIN QUANT: CPT

## 2024-06-29 PROCEDURE — 94761 N-INVAS EAR/PLS OXIMETRY MLT: CPT

## 2024-06-29 PROCEDURE — 2580000003 HC RX 258: Performed by: REGISTERED NURSE

## 2024-06-29 PROCEDURE — 83605 ASSAY OF LACTIC ACID: CPT

## 2024-06-29 PROCEDURE — 6370000000 HC RX 637 (ALT 250 FOR IP)

## 2024-06-29 PROCEDURE — 6370000000 HC RX 637 (ALT 250 FOR IP): Performed by: STUDENT IN AN ORGANIZED HEALTH CARE EDUCATION/TRAINING PROGRAM

## 2024-06-29 PROCEDURE — 80048 BASIC METABOLIC PNL TOTAL CA: CPT

## 2024-06-29 PROCEDURE — 85027 COMPLETE CBC AUTOMATED: CPT

## 2024-06-29 PROCEDURE — 2700000000 HC OXYGEN THERAPY PER DAY

## 2024-06-29 PROCEDURE — 99291 CRITICAL CARE FIRST HOUR: CPT | Performed by: PSYCHIATRY & NEUROLOGY

## 2024-06-29 PROCEDURE — 2500000003 HC RX 250 WO HCPCS: Performed by: PSYCHIATRY & NEUROLOGY

## 2024-06-29 PROCEDURE — 94003 VENT MGMT INPAT SUBQ DAY: CPT

## 2024-06-29 PROCEDURE — 36600 WITHDRAWAL OF ARTERIAL BLOOD: CPT

## 2024-06-29 PROCEDURE — 6360000002 HC RX W HCPCS

## 2024-06-29 PROCEDURE — 82803 BLOOD GASES ANY COMBINATION: CPT

## 2024-06-29 PROCEDURE — 2000000000 HC ICU R&B

## 2024-06-29 PROCEDURE — 80162 ASSAY OF DIGOXIN TOTAL: CPT

## 2024-06-29 PROCEDURE — 82947 ASSAY GLUCOSE BLOOD QUANT: CPT

## 2024-06-29 RX ORDER — CALCIUM GLUCONATE 20 MG/ML
2000 INJECTION, SOLUTION INTRAVENOUS ONCE
Status: COMPLETED | OUTPATIENT
Start: 2024-06-29 | End: 2024-06-29

## 2024-06-29 RX ORDER — DOXAZOSIN 2 MG/1
4 TABLET ORAL DAILY
Status: DISCONTINUED | OUTPATIENT
Start: 2024-06-29 | End: 2024-07-06

## 2024-06-29 RX ADMIN — DIGOXIN 125 MCG: 125 TABLET ORAL at 08:06

## 2024-06-29 RX ADMIN — DOXAZOSIN 4 MG: 2 TABLET ORAL at 10:32

## 2024-06-29 RX ADMIN — FAMOTIDINE 20 MG: 20 TABLET, FILM COATED ORAL at 08:06

## 2024-06-29 RX ADMIN — CARBIDOPA AND LEVODOPA 3 TABLET: 25; 100 TABLET ORAL at 20:39

## 2024-06-29 RX ADMIN — ASPIRIN 81 MG 81 MG: 81 TABLET ORAL at 08:05

## 2024-06-29 RX ADMIN — INSULIN GLARGINE 36 UNITS: 100 INJECTION, SOLUTION SUBCUTANEOUS at 19:42

## 2024-06-29 RX ADMIN — FAMOTIDINE 20 MG: 20 TABLET, FILM COATED ORAL at 20:39

## 2024-06-29 RX ADMIN — FUROSEMIDE 20 MG: 20 TABLET ORAL at 08:06

## 2024-06-29 RX ADMIN — CARBIDOPA AND LEVODOPA 3 TABLET: 25; 100 TABLET ORAL at 08:04

## 2024-06-29 RX ADMIN — METOPROLOL TARTRATE 25 MG: 25 TABLET, FILM COATED ORAL at 08:05

## 2024-06-29 RX ADMIN — FUROSEMIDE 20 MG: 20 TABLET ORAL at 17:47

## 2024-06-29 RX ADMIN — ENOXAPARIN SODIUM 30 MG: 100 INJECTION SUBCUTANEOUS at 19:42

## 2024-06-29 RX ADMIN — CALCIUM GLUCONATE 2000 MG: 20 INJECTION, SOLUTION INTRAVENOUS at 12:37

## 2024-06-29 RX ADMIN — METOPROLOL TARTRATE 25 MG: 25 TABLET, FILM COATED ORAL at 20:39

## 2024-06-29 RX ADMIN — ATORVASTATIN CALCIUM 80 MG: 80 TABLET, FILM COATED ORAL at 20:39

## 2024-06-29 RX ADMIN — SODIUM CHLORIDE, PRESERVATIVE FREE 10 ML: 5 INJECTION INTRAVENOUS at 19:43

## 2024-06-29 RX ADMIN — ENOXAPARIN SODIUM 30 MG: 100 INJECTION SUBCUTANEOUS at 08:05

## 2024-06-29 RX ADMIN — CARBIDOPA AND LEVODOPA 3 TABLET: 25; 100 TABLET ORAL at 14:05

## 2024-06-29 RX ADMIN — SODIUM CHLORIDE, PRESERVATIVE FREE 10 ML: 5 INJECTION INTRAVENOUS at 08:06

## 2024-06-29 RX ADMIN — CHLORHEXIDINE GLUCONATE 0.12% ORAL RINSE 15 ML: 1.2 LIQUID ORAL at 19:43

## 2024-06-29 RX ADMIN — MODAFINIL 200 MG: 100 TABLET ORAL at 08:04

## 2024-06-29 RX ADMIN — CHLORHEXIDINE GLUCONATE 0.12% ORAL RINSE 15 ML: 1.2 LIQUID ORAL at 08:05

## 2024-06-29 ASSESSMENT — PULMONARY FUNCTION TESTS
PIF_VALUE: 16
PIF_VALUE: 16
PIF_VALUE: 28
PIF_VALUE: 17
PIF_VALUE: 28
PIF_VALUE: 16
PIF_VALUE: 16
PIF_VALUE: 17
PIF_VALUE: 30
PIF_VALUE: 18
PIF_VALUE: 28
PIF_VALUE: 16
PIF_VALUE: 30
PIF_VALUE: 20
PIF_VALUE: 28
PIF_VALUE: 16
PIF_VALUE: 6
PIF_VALUE: 16
PIF_VALUE: 28
PIF_VALUE: 16
PIF_VALUE: 27
PIF_VALUE: 15
PIF_VALUE: 20
PIF_VALUE: 31
PIF_VALUE: 35

## 2024-06-29 ASSESSMENT — PAIN SCALES - GENERAL: PAINLEVEL_OUTOF10: 0

## 2024-06-30 ENCOUNTER — APPOINTMENT (OUTPATIENT)
Dept: CT IMAGING | Age: 74
DRG: 064 | End: 2024-06-30
Payer: MEDICARE

## 2024-06-30 LAB
ANION GAP SERPL CALCULATED.3IONS-SCNC: 8 MMOL/L (ref 9–16)
ANION GAP SERPL CALCULATED.3IONS-SCNC: 9 MMOL/L (ref 9–16)
ANTI-XA UNFRAC HEPARIN: 0.19 IU/L
ANTI-XA UNFRAC HEPARIN: 0.32 IU/L
ANTI-XA UNFRAC HEPARIN: <0.1 IU/L
BUN SERPL-MCNC: 20 MG/DL (ref 8–23)
BUN SERPL-MCNC: 20 MG/DL (ref 8–23)
CALCIUM SERPL-MCNC: 8.2 MG/DL (ref 8.6–10.4)
CALCIUM SERPL-MCNC: 8.3 MG/DL (ref 8.6–10.4)
CHLORIDE SERPL-SCNC: 100 MMOL/L (ref 98–107)
CHLORIDE SERPL-SCNC: 101 MMOL/L (ref 98–107)
CO2 SERPL-SCNC: 21 MMOL/L (ref 20–31)
CO2 SERPL-SCNC: 24 MMOL/L (ref 20–31)
CREAT SERPL-MCNC: 0.7 MG/DL (ref 0.7–1.2)
CREAT SERPL-MCNC: 0.7 MG/DL (ref 0.7–1.2)
ERYTHROCYTE [DISTWIDTH] IN BLOOD BY AUTOMATED COUNT: 14.6 % (ref 11.8–14.4)
GFR, ESTIMATED: >90 ML/MIN/1.73M2
GFR, ESTIMATED: >90 ML/MIN/1.73M2
GLUCOSE BLD-MCNC: 154 MG/DL (ref 75–110)
GLUCOSE BLD-MCNC: 159 MG/DL (ref 75–110)
GLUCOSE BLD-MCNC: 171 MG/DL (ref 75–110)
GLUCOSE BLD-MCNC: 181 MG/DL (ref 75–110)
GLUCOSE SERPL-MCNC: 174 MG/DL (ref 74–99)
GLUCOSE SERPL-MCNC: 186 MG/DL (ref 74–99)
HCT VFR BLD AUTO: 36.5 % (ref 40.7–50.3)
HGB BLD-MCNC: 11.6 G/DL (ref 13–17)
MCH RBC QN AUTO: 30.8 PG (ref 25.2–33.5)
MCHC RBC AUTO-ENTMCNC: 31.8 G/DL (ref 28.4–34.8)
MCV RBC AUTO: 96.8 FL (ref 82.6–102.9)
NRBC BLD-RTO: 0 PER 100 WBC
PLATELET # BLD AUTO: 192 K/UL (ref 138–453)
PMV BLD AUTO: 11.9 FL (ref 8.1–13.5)
POTASSIUM SERPL-SCNC: 4.1 MMOL/L (ref 3.7–5.3)
POTASSIUM SERPL-SCNC: 4.3 MMOL/L (ref 3.7–5.3)
RBC # BLD AUTO: 3.77 M/UL (ref 4.21–5.77)
SODIUM SERPL-SCNC: 130 MMOL/L (ref 136–145)
SODIUM SERPL-SCNC: 133 MMOL/L (ref 136–145)
TROPONIN I SERPL HS-MCNC: 61 NG/L (ref 0–22)
TROPONIN I SERPL HS-MCNC: 64 NG/L (ref 0–22)
WBC OTHER # BLD: 8.7 K/UL (ref 3.5–11.3)

## 2024-06-30 PROCEDURE — 2700000000 HC OXYGEN THERAPY PER DAY

## 2024-06-30 PROCEDURE — 6370000000 HC RX 637 (ALT 250 FOR IP): Performed by: STUDENT IN AN ORGANIZED HEALTH CARE EDUCATION/TRAINING PROGRAM

## 2024-06-30 PROCEDURE — 6370000000 HC RX 637 (ALT 250 FOR IP): Performed by: PSYCHIATRY & NEUROLOGY

## 2024-06-30 PROCEDURE — 6360000002 HC RX W HCPCS

## 2024-06-30 PROCEDURE — 85520 HEPARIN ASSAY: CPT

## 2024-06-30 PROCEDURE — 6370000000 HC RX 637 (ALT 250 FOR IP)

## 2024-06-30 PROCEDURE — 2000000000 HC ICU R&B

## 2024-06-30 PROCEDURE — 94761 N-INVAS EAR/PLS OXIMETRY MLT: CPT

## 2024-06-30 PROCEDURE — 84484 ASSAY OF TROPONIN QUANT: CPT

## 2024-06-30 PROCEDURE — 6370000000 HC RX 637 (ALT 250 FOR IP): Performed by: REGISTERED NURSE

## 2024-06-30 PROCEDURE — 70450 CT HEAD/BRAIN W/O DYE: CPT

## 2024-06-30 PROCEDURE — 94003 VENT MGMT INPAT SUBQ DAY: CPT

## 2024-06-30 PROCEDURE — 99291 CRITICAL CARE FIRST HOUR: CPT | Performed by: PSYCHIATRY & NEUROLOGY

## 2024-06-30 PROCEDURE — 80048 BASIC METABOLIC PNL TOTAL CA: CPT

## 2024-06-30 PROCEDURE — 2500000003 HC RX 250 WO HCPCS: Performed by: STUDENT IN AN ORGANIZED HEALTH CARE EDUCATION/TRAINING PROGRAM

## 2024-06-30 PROCEDURE — 85027 COMPLETE CBC AUTOMATED: CPT

## 2024-06-30 PROCEDURE — 36415 COLL VENOUS BLD VENIPUNCTURE: CPT

## 2024-06-30 PROCEDURE — 6360000002 HC RX W HCPCS: Performed by: STUDENT IN AN ORGANIZED HEALTH CARE EDUCATION/TRAINING PROGRAM

## 2024-06-30 PROCEDURE — 2580000003 HC RX 258: Performed by: REGISTERED NURSE

## 2024-06-30 PROCEDURE — 82947 ASSAY GLUCOSE BLOOD QUANT: CPT

## 2024-06-30 PROCEDURE — 93005 ELECTROCARDIOGRAM TRACING: CPT | Performed by: STUDENT IN AN ORGANIZED HEALTH CARE EDUCATION/TRAINING PROGRAM

## 2024-06-30 RX ORDER — CALCIUM GLUCONATE 20 MG/ML
2000 INJECTION, SOLUTION INTRAVENOUS ONCE
Status: COMPLETED | OUTPATIENT
Start: 2024-06-30 | End: 2024-06-30

## 2024-06-30 RX ORDER — MAGNESIUM SULFATE IN WATER 40 MG/ML
2000 INJECTION, SOLUTION INTRAVENOUS ONCE
Status: COMPLETED | OUTPATIENT
Start: 2024-06-30 | End: 2024-06-30

## 2024-06-30 RX ORDER — HEPARIN SODIUM 10000 [USP'U]/100ML
5-30 INJECTION, SOLUTION INTRAVENOUS CONTINUOUS
Status: DISCONTINUED | OUTPATIENT
Start: 2024-06-30 | End: 2024-07-06

## 2024-06-30 RX ADMIN — CARBIDOPA AND LEVODOPA 3 TABLET: 25; 100 TABLET ORAL at 19:43

## 2024-06-30 RX ADMIN — FUROSEMIDE 20 MG: 20 TABLET ORAL at 17:27

## 2024-06-30 RX ADMIN — FENTANYL CITRATE 50 MCG: 50 INJECTION, SOLUTION INTRAMUSCULAR; INTRAVENOUS at 16:26

## 2024-06-30 RX ADMIN — MODAFINIL 200 MG: 100 TABLET ORAL at 07:58

## 2024-06-30 RX ADMIN — CHLORHEXIDINE GLUCONATE 0.12% ORAL RINSE 15 ML: 1.2 LIQUID ORAL at 19:43

## 2024-06-30 RX ADMIN — CHLORHEXIDINE GLUCONATE 0.12% ORAL RINSE 15 ML: 1.2 LIQUID ORAL at 07:58

## 2024-06-30 RX ADMIN — FAMOTIDINE 20 MG: 20 TABLET, FILM COATED ORAL at 07:57

## 2024-06-30 RX ADMIN — CARBIDOPA AND LEVODOPA 3 TABLET: 25; 100 TABLET ORAL at 14:53

## 2024-06-30 RX ADMIN — INSULIN GLARGINE 36 UNITS: 100 INJECTION, SOLUTION SUBCUTANEOUS at 19:43

## 2024-06-30 RX ADMIN — CARBIDOPA AND LEVODOPA 3 TABLET: 25; 100 TABLET ORAL at 09:07

## 2024-06-30 RX ADMIN — SODIUM CHLORIDE, PRESERVATIVE FREE 10 ML: 5 INJECTION INTRAVENOUS at 07:58

## 2024-06-30 RX ADMIN — SODIUM CHLORIDE, PRESERVATIVE FREE 10 ML: 5 INJECTION INTRAVENOUS at 19:43

## 2024-06-30 RX ADMIN — FAMOTIDINE 20 MG: 20 TABLET, FILM COATED ORAL at 19:43

## 2024-06-30 RX ADMIN — DIGOXIN 125 MCG: 125 TABLET ORAL at 08:02

## 2024-06-30 RX ADMIN — ASPIRIN 81 MG 81 MG: 81 TABLET ORAL at 07:58

## 2024-06-30 RX ADMIN — MAGNESIUM SULFATE HEPTAHYDRATE 2000 MG: 40 INJECTION, SOLUTION INTRAVENOUS at 06:08

## 2024-06-30 RX ADMIN — CALCIUM GLUCONATE 2000 MG: 20 INJECTION, SOLUTION INTRAVENOUS at 06:06

## 2024-06-30 RX ADMIN — HEPARIN SODIUM 8 UNITS/KG/HR: 10000 INJECTION, SOLUTION INTRAVENOUS at 08:12

## 2024-06-30 RX ADMIN — ATORVASTATIN CALCIUM 80 MG: 80 TABLET, FILM COATED ORAL at 19:43

## 2024-06-30 RX ADMIN — METOPROLOL TARTRATE 25 MG: 25 TABLET, FILM COATED ORAL at 19:43

## 2024-06-30 ASSESSMENT — PULMONARY FUNCTION TESTS
PIF_VALUE: 14
PIF_VALUE: 27
PIF_VALUE: 18
PIF_VALUE: 14
PIF_VALUE: 26
PIF_VALUE: 28
PIF_VALUE: 28
PIF_VALUE: 23
PIF_VALUE: 24
PIF_VALUE: 23
PIF_VALUE: 14
PIF_VALUE: 14
PIF_VALUE: 18
PIF_VALUE: 22
PIF_VALUE: 27
PIF_VALUE: 14
PIF_VALUE: 21
PIF_VALUE: 22
PIF_VALUE: 14
PIF_VALUE: 22
PIF_VALUE: 26
PIF_VALUE: 29

## 2024-07-01 ENCOUNTER — APPOINTMENT (OUTPATIENT)
Dept: GENERAL RADIOLOGY | Age: 74
DRG: 064 | End: 2024-07-01
Payer: MEDICARE

## 2024-07-01 LAB
ANION GAP SERPL CALCULATED.3IONS-SCNC: 11 MMOL/L (ref 9–16)
ANTI-XA UNFRAC HEPARIN: 0.31 IU/L
ANTI-XA UNFRAC HEPARIN: 0.36 IU/L
ANTI-XA UNFRAC HEPARIN: 0.38 IU/L
ANTI-XA UNFRAC HEPARIN: 0.41 IU/L
BUN SERPL-MCNC: 17 MG/DL (ref 8–23)
CALCIUM SERPL-MCNC: 8 MG/DL (ref 8.6–10.4)
CHLORIDE SERPL-SCNC: 100 MMOL/L (ref 98–107)
CO2 SERPL-SCNC: 25 MMOL/L (ref 20–31)
CREAT SERPL-MCNC: 0.7 MG/DL (ref 0.7–1.2)
EKG ATRIAL RATE: 208 BPM
EKG ATRIAL RATE: 357 BPM
EKG Q-T INTERVAL: 402 MS
EKG Q-T INTERVAL: 442 MS
EKG QRS DURATION: 102 MS
EKG QRS DURATION: 104 MS
EKG QTC CALCULATION (BAZETT): 454 MS
EKG QTC CALCULATION (BAZETT): 528 MS
EKG R AXIS: 0 DEGREES
EKG R AXIS: 14 DEGREES
EKG T AXIS: -35 DEGREES
EKG T AXIS: -41 DEGREES
EKG VENTRICULAR RATE: 77 BPM
EKG VENTRICULAR RATE: 86 BPM
ERYTHROCYTE [DISTWIDTH] IN BLOOD BY AUTOMATED COUNT: 14.5 % (ref 11.8–14.4)
GFR, ESTIMATED: >90 ML/MIN/1.73M2
GLUCOSE BLD-MCNC: 137 MG/DL (ref 75–110)
GLUCOSE BLD-MCNC: 165 MG/DL (ref 75–110)
GLUCOSE BLD-MCNC: 173 MG/DL (ref 74–100)
GLUCOSE SERPL-MCNC: 159 MG/DL (ref 74–99)
HCT VFR BLD AUTO: 37 % (ref 40.7–50.3)
HGB BLD-MCNC: 11.5 G/DL (ref 13–17)
MCH RBC QN AUTO: 30.9 PG (ref 25.2–33.5)
MCHC RBC AUTO-ENTMCNC: 31.1 G/DL (ref 28.4–34.8)
MCV RBC AUTO: 99.5 FL (ref 82.6–102.9)
MODE: ABNORMAL
NRBC BLD-RTO: 0 PER 100 WBC
O2 DELIVERY DEVICE: ABNORMAL
PLATELET # BLD AUTO: 183 K/UL (ref 138–453)
PMV BLD AUTO: 11.9 FL (ref 8.1–13.5)
POC HCO3: 28.6 MMOL/L (ref 21–28)
POC O2 SATURATION: 95.8 % (ref 94–98)
POC PCO2: 39.5 MM HG (ref 35–48)
POC PH: 7.47 (ref 7.35–7.45)
POC PO2: 75.3 MM HG (ref 83–108)
POSITIVE BASE EXCESS, ART: 4.6 MMOL/L (ref 0–3)
POTASSIUM SERPL-SCNC: 4.1 MMOL/L (ref 3.7–5.3)
RBC # BLD AUTO: 3.72 M/UL (ref 4.21–5.77)
SODIUM SERPL-SCNC: 136 MMOL/L (ref 136–145)
TROPONIN I SERPL HS-MCNC: 104 NG/L (ref 0–22)
WBC OTHER # BLD: 10.2 K/UL (ref 3.5–11.3)

## 2024-07-01 PROCEDURE — 85520 HEPARIN ASSAY: CPT

## 2024-07-01 PROCEDURE — 80048 BASIC METABOLIC PNL TOTAL CA: CPT

## 2024-07-01 PROCEDURE — 6370000000 HC RX 637 (ALT 250 FOR IP): Performed by: STUDENT IN AN ORGANIZED HEALTH CARE EDUCATION/TRAINING PROGRAM

## 2024-07-01 PROCEDURE — 2000000000 HC ICU R&B

## 2024-07-01 PROCEDURE — 2580000003 HC RX 258: Performed by: REGISTERED NURSE

## 2024-07-01 PROCEDURE — 71045 X-RAY EXAM CHEST 1 VIEW: CPT

## 2024-07-01 PROCEDURE — 6370000000 HC RX 637 (ALT 250 FOR IP): Performed by: PSYCHIATRY & NEUROLOGY

## 2024-07-01 PROCEDURE — 82803 BLOOD GASES ANY COMBINATION: CPT

## 2024-07-01 PROCEDURE — 6370000000 HC RX 637 (ALT 250 FOR IP)

## 2024-07-01 PROCEDURE — 84484 ASSAY OF TROPONIN QUANT: CPT

## 2024-07-01 PROCEDURE — 94003 VENT MGMT INPAT SUBQ DAY: CPT

## 2024-07-01 PROCEDURE — 85027 COMPLETE CBC AUTOMATED: CPT

## 2024-07-01 PROCEDURE — 82947 ASSAY GLUCOSE BLOOD QUANT: CPT

## 2024-07-01 PROCEDURE — 6360000002 HC RX W HCPCS: Performed by: STUDENT IN AN ORGANIZED HEALTH CARE EDUCATION/TRAINING PROGRAM

## 2024-07-01 PROCEDURE — 2700000000 HC OXYGEN THERAPY PER DAY

## 2024-07-01 PROCEDURE — 99291 CRITICAL CARE FIRST HOUR: CPT | Performed by: STUDENT IN AN ORGANIZED HEALTH CARE EDUCATION/TRAINING PROGRAM

## 2024-07-01 PROCEDURE — 36415 COLL VENOUS BLD VENIPUNCTURE: CPT

## 2024-07-01 PROCEDURE — 6370000000 HC RX 637 (ALT 250 FOR IP): Performed by: REGISTERED NURSE

## 2024-07-01 PROCEDURE — 6360000002 HC RX W HCPCS

## 2024-07-01 PROCEDURE — 36600 WITHDRAWAL OF ARTERIAL BLOOD: CPT

## 2024-07-01 RX ORDER — FUROSEMIDE 10 MG/ML
20 INJECTION INTRAMUSCULAR; INTRAVENOUS ONCE
Status: COMPLETED | OUTPATIENT
Start: 2024-07-01 | End: 2024-07-01

## 2024-07-01 RX ORDER — LABETALOL HYDROCHLORIDE 5 MG/ML
10 INJECTION, SOLUTION INTRAVENOUS
Status: DISCONTINUED | OUTPATIENT
Start: 2024-07-01 | End: 2024-07-10 | Stop reason: HOSPADM

## 2024-07-01 RX ADMIN — CARBIDOPA AND LEVODOPA 3 TABLET: 25; 100 TABLET ORAL at 19:24

## 2024-07-01 RX ADMIN — CARBIDOPA AND LEVODOPA 3 TABLET: 25; 100 TABLET ORAL at 14:24

## 2024-07-01 RX ADMIN — DIGOXIN 125 MCG: 125 TABLET ORAL at 09:20

## 2024-07-01 RX ADMIN — FAMOTIDINE 20 MG: 20 TABLET, FILM COATED ORAL at 09:20

## 2024-07-01 RX ADMIN — FUROSEMIDE 20 MG: 10 INJECTION, SOLUTION INTRAMUSCULAR; INTRAVENOUS at 11:13

## 2024-07-01 RX ADMIN — METOPROLOL TARTRATE 25 MG: 25 TABLET, FILM COATED ORAL at 19:24

## 2024-07-01 RX ADMIN — ASPIRIN 81 MG 81 MG: 81 TABLET ORAL at 09:20

## 2024-07-01 RX ADMIN — METOPROLOL TARTRATE 25 MG: 25 TABLET, FILM COATED ORAL at 09:20

## 2024-07-01 RX ADMIN — FUROSEMIDE 20 MG: 20 TABLET ORAL at 18:28

## 2024-07-01 RX ADMIN — INSULIN GLARGINE 36 UNITS: 100 INJECTION, SOLUTION SUBCUTANEOUS at 19:24

## 2024-07-01 RX ADMIN — DOXAZOSIN 4 MG: 2 TABLET ORAL at 09:20

## 2024-07-01 RX ADMIN — HEPARIN SODIUM 10 UNITS/KG/HR: 10000 INJECTION, SOLUTION INTRAVENOUS at 04:01

## 2024-07-01 RX ADMIN — ATORVASTATIN CALCIUM 80 MG: 80 TABLET, FILM COATED ORAL at 19:24

## 2024-07-01 RX ADMIN — CARBIDOPA AND LEVODOPA 3 TABLET: 25; 100 TABLET ORAL at 09:20

## 2024-07-01 RX ADMIN — FUROSEMIDE 20 MG: 20 TABLET ORAL at 09:20

## 2024-07-01 RX ADMIN — CHLORHEXIDINE GLUCONATE 0.12% ORAL RINSE 15 ML: 1.2 LIQUID ORAL at 09:20

## 2024-07-01 RX ADMIN — SODIUM CHLORIDE, PRESERVATIVE FREE 10 ML: 5 INJECTION INTRAVENOUS at 19:29

## 2024-07-01 RX ADMIN — MODAFINIL 200 MG: 100 TABLET ORAL at 09:20

## 2024-07-01 ASSESSMENT — PULMONARY FUNCTION TESTS
PIF_VALUE: 29
PIF_VALUE: 14
PIF_VALUE: 25
PIF_VALUE: 22
PIF_VALUE: 14
PIF_VALUE: 24
PIF_VALUE: 29
PIF_VALUE: 28
PIF_VALUE: 25
PIF_VALUE: 14
PIF_VALUE: 29
PIF_VALUE: 22
PIF_VALUE: 14

## 2024-07-01 NOTE — CODE DOCUMENTATION
Patient has been extubated and is on 4 Liters NC.  Will updated [facility].  Will attempt to discuss placement with patient.  Brother Ray made choice for LTACH

## 2024-07-02 ENCOUNTER — APPOINTMENT (OUTPATIENT)
Dept: CT IMAGING | Age: 74
DRG: 064 | End: 2024-07-02
Payer: MEDICARE

## 2024-07-02 LAB
ANION GAP SERPL CALCULATED.3IONS-SCNC: 11 MMOL/L (ref 9–16)
ANION GAP SERPL CALCULATED.3IONS-SCNC: 7 MMOL/L (ref 9–16)
ANTI-XA UNFRAC HEPARIN: 0.39 IU/L
ANTI-XA UNFRAC HEPARIN: <0.1 IU/L
BUN SERPL-MCNC: 17 MG/DL (ref 8–23)
BUN SERPL-MCNC: 18 MG/DL (ref 8–23)
CALCIUM SERPL-MCNC: 8.2 MG/DL (ref 8.6–10.4)
CALCIUM SERPL-MCNC: 8.6 MG/DL (ref 8.6–10.4)
CHLORIDE SERPL-SCNC: 100 MMOL/L (ref 98–107)
CHLORIDE SERPL-SCNC: 101 MMOL/L (ref 98–107)
CO2 SERPL-SCNC: 24 MMOL/L (ref 20–31)
CO2 SERPL-SCNC: 28 MMOL/L (ref 20–31)
CREAT SERPL-MCNC: 0.7 MG/DL (ref 0.7–1.2)
CREAT SERPL-MCNC: 0.7 MG/DL (ref 0.7–1.2)
ERYTHROCYTE [DISTWIDTH] IN BLOOD BY AUTOMATED COUNT: 14.6 % (ref 11.8–14.4)
ERYTHROCYTE [DISTWIDTH] IN BLOOD BY AUTOMATED COUNT: 14.7 % (ref 11.8–14.4)
GFR, ESTIMATED: >90 ML/MIN/1.73M2
GFR, ESTIMATED: >90 ML/MIN/1.73M2
GLUCOSE BLD-MCNC: 123 MG/DL (ref 75–110)
GLUCOSE BLD-MCNC: 124 MG/DL (ref 75–110)
GLUCOSE BLD-MCNC: 140 MG/DL (ref 75–110)
GLUCOSE BLD-MCNC: 159 MG/DL (ref 75–110)
GLUCOSE BLD-MCNC: 83 MG/DL (ref 75–110)
GLUCOSE SERPL-MCNC: 108 MG/DL (ref 74–99)
GLUCOSE SERPL-MCNC: 114 MG/DL (ref 74–99)
HCT VFR BLD AUTO: 36.7 % (ref 40.7–50.3)
HCT VFR BLD AUTO: 37.8 % (ref 40.7–50.3)
HGB BLD-MCNC: 11.6 G/DL (ref 13–17)
HGB BLD-MCNC: 11.9 G/DL (ref 13–17)
MCH RBC QN AUTO: 30.9 PG (ref 25.2–33.5)
MCH RBC QN AUTO: 30.9 PG (ref 25.2–33.5)
MCHC RBC AUTO-ENTMCNC: 30.7 G/DL (ref 28.4–34.8)
MCHC RBC AUTO-ENTMCNC: 32.4 G/DL (ref 28.4–34.8)
MCV RBC AUTO: 100.5 FL (ref 82.6–102.9)
MCV RBC AUTO: 95.3 FL (ref 82.6–102.9)
NRBC BLD-RTO: 0 PER 100 WBC
NRBC BLD-RTO: 0 PER 100 WBC
PLATELET # BLD AUTO: 190 K/UL (ref 138–453)
PLATELET # BLD AUTO: 227 K/UL (ref 138–453)
PMV BLD AUTO: 11.7 FL (ref 8.1–13.5)
PMV BLD AUTO: 11.9 FL (ref 8.1–13.5)
POTASSIUM SERPL-SCNC: 4.1 MMOL/L (ref 3.7–5.3)
POTASSIUM SERPL-SCNC: 4.1 MMOL/L (ref 3.7–5.3)
RBC # BLD AUTO: 3.76 M/UL (ref 4.21–5.77)
RBC # BLD AUTO: 3.85 M/UL (ref 4.21–5.77)
SODIUM SERPL-SCNC: 135 MMOL/L (ref 136–145)
SODIUM SERPL-SCNC: 136 MMOL/L (ref 136–145)
WBC OTHER # BLD: 10.1 K/UL (ref 3.5–11.3)
WBC OTHER # BLD: 10.1 K/UL (ref 3.5–11.3)

## 2024-07-02 PROCEDURE — 80048 BASIC METABOLIC PNL TOTAL CA: CPT

## 2024-07-02 PROCEDURE — 6370000000 HC RX 637 (ALT 250 FOR IP): Performed by: PSYCHIATRY & NEUROLOGY

## 2024-07-02 PROCEDURE — 6370000000 HC RX 637 (ALT 250 FOR IP): Performed by: STUDENT IN AN ORGANIZED HEALTH CARE EDUCATION/TRAINING PROGRAM

## 2024-07-02 PROCEDURE — 70450 CT HEAD/BRAIN W/O DYE: CPT

## 2024-07-02 PROCEDURE — 2000000000 HC ICU R&B

## 2024-07-02 PROCEDURE — 6370000000 HC RX 637 (ALT 250 FOR IP)

## 2024-07-02 PROCEDURE — 85027 COMPLETE CBC AUTOMATED: CPT

## 2024-07-02 PROCEDURE — 99291 CRITICAL CARE FIRST HOUR: CPT | Performed by: STUDENT IN AN ORGANIZED HEALTH CARE EDUCATION/TRAINING PROGRAM

## 2024-07-02 PROCEDURE — 2580000003 HC RX 258: Performed by: REGISTERED NURSE

## 2024-07-02 PROCEDURE — 85520 HEPARIN ASSAY: CPT

## 2024-07-02 PROCEDURE — 36415 COLL VENOUS BLD VENIPUNCTURE: CPT

## 2024-07-02 PROCEDURE — 6360000002 HC RX W HCPCS: Performed by: STUDENT IN AN ORGANIZED HEALTH CARE EDUCATION/TRAINING PROGRAM

## 2024-07-02 RX ADMIN — CARBIDOPA AND LEVODOPA 3 TABLET: 25; 100 TABLET ORAL at 14:13

## 2024-07-02 RX ADMIN — ATORVASTATIN CALCIUM 80 MG: 80 TABLET, FILM COATED ORAL at 20:39

## 2024-07-02 RX ADMIN — DIGOXIN 125 MCG: 125 TABLET ORAL at 09:00

## 2024-07-02 RX ADMIN — FUROSEMIDE 20 MG: 20 TABLET ORAL at 18:09

## 2024-07-02 RX ADMIN — METOPROLOL TARTRATE 25 MG: 25 TABLET, FILM COATED ORAL at 20:39

## 2024-07-02 RX ADMIN — MODAFINIL 200 MG: 100 TABLET ORAL at 09:00

## 2024-07-02 RX ADMIN — CARBIDOPA AND LEVODOPA 3 TABLET: 25; 100 TABLET ORAL at 09:00

## 2024-07-02 RX ADMIN — DOXAZOSIN 4 MG: 2 TABLET ORAL at 09:00

## 2024-07-02 RX ADMIN — CARBIDOPA AND LEVODOPA 3 TABLET: 25; 100 TABLET ORAL at 20:39

## 2024-07-02 RX ADMIN — METOPROLOL TARTRATE 25 MG: 25 TABLET, FILM COATED ORAL at 09:00

## 2024-07-02 RX ADMIN — SODIUM CHLORIDE, PRESERVATIVE FREE 10 ML: 5 INJECTION INTRAVENOUS at 20:39

## 2024-07-02 RX ADMIN — FUROSEMIDE 20 MG: 20 TABLET ORAL at 09:00

## 2024-07-02 RX ADMIN — HEPARIN SODIUM 10 UNITS/KG/HR: 10000 INJECTION, SOLUTION INTRAVENOUS at 00:50

## 2024-07-03 ENCOUNTER — APPOINTMENT (OUTPATIENT)
Dept: CT IMAGING | Age: 74
DRG: 064 | End: 2024-07-03
Payer: MEDICARE

## 2024-07-03 PROBLEM — R13.12 OROPHARYNGEAL DYSPHAGIA: Status: ACTIVE | Noted: 2024-07-03

## 2024-07-03 LAB
ANION GAP SERPL CALCULATED.3IONS-SCNC: 7 MMOL/L (ref 9–16)
ANTI-XA UNFRAC HEPARIN: <0.1 IU/L
BUN SERPL-MCNC: 18 MG/DL (ref 8–23)
CALCIUM SERPL-MCNC: 8.9 MG/DL (ref 8.6–10.4)
CHLORIDE SERPL-SCNC: 99 MMOL/L (ref 98–107)
CO2 SERPL-SCNC: 27 MMOL/L (ref 20–31)
CREAT SERPL-MCNC: 0.7 MG/DL (ref 0.7–1.2)
ERYTHROCYTE [DISTWIDTH] IN BLOOD BY AUTOMATED COUNT: 14.5 % (ref 11.8–14.4)
GFR, ESTIMATED: >90 ML/MIN/1.73M2
GLUCOSE BLD-MCNC: 158 MG/DL (ref 75–110)
GLUCOSE BLD-MCNC: 187 MG/DL (ref 75–110)
GLUCOSE BLD-MCNC: 250 MG/DL (ref 75–110)
GLUCOSE SERPL-MCNC: 176 MG/DL (ref 74–99)
HCT VFR BLD AUTO: 38.1 % (ref 40.7–50.3)
HGB BLD-MCNC: 12.5 G/DL (ref 13–17)
MCH RBC QN AUTO: 30.8 PG (ref 25.2–33.5)
MCHC RBC AUTO-ENTMCNC: 32.8 G/DL (ref 28.4–34.8)
MCV RBC AUTO: 93.8 FL (ref 82.6–102.9)
NRBC BLD-RTO: 0 PER 100 WBC
PLATELET # BLD AUTO: 251 K/UL (ref 138–453)
PMV BLD AUTO: 11.7 FL (ref 8.1–13.5)
POTASSIUM SERPL-SCNC: 4.8 MMOL/L (ref 3.7–5.3)
RBC # BLD AUTO: 4.06 M/UL (ref 4.21–5.77)
SODIUM SERPL-SCNC: 133 MMOL/L (ref 136–145)
WBC OTHER # BLD: 11.8 K/UL (ref 3.5–11.3)

## 2024-07-03 PROCEDURE — 99291 CRITICAL CARE FIRST HOUR: CPT | Performed by: STUDENT IN AN ORGANIZED HEALTH CARE EDUCATION/TRAINING PROGRAM

## 2024-07-03 PROCEDURE — 99223 1ST HOSP IP/OBS HIGH 75: CPT | Performed by: INTERNAL MEDICINE

## 2024-07-03 PROCEDURE — 6370000000 HC RX 637 (ALT 250 FOR IP)

## 2024-07-03 PROCEDURE — 82947 ASSAY GLUCOSE BLOOD QUANT: CPT

## 2024-07-03 PROCEDURE — 36415 COLL VENOUS BLD VENIPUNCTURE: CPT

## 2024-07-03 PROCEDURE — 6370000000 HC RX 637 (ALT 250 FOR IP): Performed by: PSYCHIATRY & NEUROLOGY

## 2024-07-03 PROCEDURE — 6360000002 HC RX W HCPCS

## 2024-07-03 PROCEDURE — 85027 COMPLETE CBC AUTOMATED: CPT

## 2024-07-03 PROCEDURE — 80048 BASIC METABOLIC PNL TOTAL CA: CPT

## 2024-07-03 PROCEDURE — 2580000003 HC RX 258: Performed by: REGISTERED NURSE

## 2024-07-03 PROCEDURE — 85520 HEPARIN ASSAY: CPT

## 2024-07-03 PROCEDURE — 6370000000 HC RX 637 (ALT 250 FOR IP): Performed by: STUDENT IN AN ORGANIZED HEALTH CARE EDUCATION/TRAINING PROGRAM

## 2024-07-03 PROCEDURE — 70450 CT HEAD/BRAIN W/O DYE: CPT

## 2024-07-03 PROCEDURE — 99232 SBSQ HOSP IP/OBS MODERATE 35: CPT | Performed by: PSYCHIATRY & NEUROLOGY

## 2024-07-03 PROCEDURE — 2060000000 HC ICU INTERMEDIATE R&B

## 2024-07-03 RX ORDER — ENOXAPARIN SODIUM 100 MG/ML
30 INJECTION SUBCUTANEOUS 2 TIMES DAILY
Status: DISCONTINUED | OUTPATIENT
Start: 2024-07-03 | End: 2024-07-10 | Stop reason: HOSPADM

## 2024-07-03 RX ORDER — FUROSEMIDE 20 MG/1
20 TABLET ORAL DAILY
Status: DISCONTINUED | OUTPATIENT
Start: 2024-07-04 | End: 2024-07-06

## 2024-07-03 RX ADMIN — ATORVASTATIN CALCIUM 80 MG: 80 TABLET, FILM COATED ORAL at 20:32

## 2024-07-03 RX ADMIN — SODIUM CHLORIDE, PRESERVATIVE FREE 10 ML: 5 INJECTION INTRAVENOUS at 20:32

## 2024-07-03 RX ADMIN — ENOXAPARIN SODIUM 30 MG: 100 INJECTION SUBCUTANEOUS at 20:32

## 2024-07-03 RX ADMIN — METOPROLOL TARTRATE 12.5 MG: 25 TABLET, FILM COATED ORAL at 20:32

## 2024-07-03 RX ADMIN — CARBIDOPA AND LEVODOPA 3 TABLET: 25; 100 TABLET ORAL at 13:53

## 2024-07-03 RX ADMIN — INSULIN LISPRO 8 UNITS: 100 INJECTION, SOLUTION INTRAVENOUS; SUBCUTANEOUS at 16:58

## 2024-07-03 RX ADMIN — CARBIDOPA AND LEVODOPA 3 TABLET: 25; 100 TABLET ORAL at 20:32

## 2024-07-03 RX ADMIN — CARBIDOPA AND LEVODOPA 1 TABLET: 25; 100 TABLET ORAL at 11:03

## 2024-07-03 RX ADMIN — FUROSEMIDE 20 MG: 20 TABLET ORAL at 16:58

## 2024-07-03 RX ADMIN — SODIUM CHLORIDE, PRESERVATIVE FREE 10 ML: 5 INJECTION INTRAVENOUS at 08:47

## 2024-07-03 RX ADMIN — ENOXAPARIN SODIUM 30 MG: 100 INJECTION SUBCUTANEOUS at 11:03

## 2024-07-03 RX ADMIN — METOPROLOL TARTRATE 25 MG: 25 TABLET, FILM COATED ORAL at 08:41

## 2024-07-03 RX ADMIN — DIGOXIN 125 MCG: 125 TABLET ORAL at 08:42

## 2024-07-03 RX ADMIN — DOXAZOSIN 4 MG: 2 TABLET ORAL at 09:26

## 2024-07-03 RX ADMIN — MODAFINIL 200 MG: 100 TABLET ORAL at 08:41

## 2024-07-03 RX ADMIN — CARBIDOPA AND LEVODOPA 3 TABLET: 25; 100 TABLET ORAL at 08:41

## 2024-07-03 RX ADMIN — FUROSEMIDE 20 MG: 20 TABLET ORAL at 08:42

## 2024-07-03 NOTE — H&P
Diverticulosis.   6. Enlarged prostate gland.   7. No acute osseous abnormality involving the thoracic or lumbar spine.         XR CHEST PORTABLE    (Results Pending)   MRI brain without contrast    (Results Pending)             Labs and Images reviewed with:    [x] Rosette Muhammad MD  [] Tonny Soni MD  [] Gagan Trammell MD  --[] there are no new interval images to review.     ASSESSMENT AND PLAN:         ASSESSMENT:     This is a 73 y.o. male with significant past medical history of atrial fibrillation,on Eliquis was found down by EMS with last known well 48 hours prior. CT of brain with left MCA M2 infarct also noted to have rhabdomyolysis and lactic acidosis.     Patient care will be discussed with attending, will reevaluate patient along with attending.     PLAN/MEDICAL DECISION MAKING:      NEUROLOGIC:  Acute/subacute Left MCA infarct  Small acute left occipital PCA territory infarct  -  per stroke followed by 81mg daily   - MRI completed   - Echo with bubble study   - Lipitor 80mg for secondary stroke prevention   - HgbA1C in am   - Routine EEG in am   - Goal SBP >100mmHg   - Neuro checks per protocol    CARDIOVASCULAR:  Afib RVR   CHF  Hypertension   - Goal SBP >100mmHg   - Neosynephrine to meet SBP goal   - Daily BNP   - Hold home Eliquis 2/2 to risk of hemorrhagic conversion   - Continue telemetry    PULMONARY:  Acute hypoxic respiratory failure     - Vent Settings: AC-16- 550-50-8   - Daily ABG  - Chest xray in am   -     RENAL/FLUID/ELECTROLYTE:  Rhabomyolysis  - BUN 27/ Creatinine 1.6  - Urine output 30 ml/kg/hr  - IVF: NS @200cc/hr   - Trend CK q 6 hours   - Trend lytes q 6 hours   - Replace electrolytes PRN  - Daily BMP    GI/NUTRITION:  NUTRITION:  Diet NPO  - Bowel regimen: Glycolax prn   - GI prophylaxis: Pepcid     ID:  Leukocytosis   Lactic acidosis  CAP vs aspiration pna  - Tmax afebrile   - Zosyn q 8 hours   - Continue to monitor for fevers  - Daily CBC  - Trend Lactic acid     HEME: 
represent hematoma. No displaced calvarial fracture.     Moderate sized acute left MCA territory infarct. No acute intracranial hemorrhage. Large left subgaleal/scalp fluid collection could represent a subacute hematoma. Critical results were called by Dr. Frank Martin MD to Jacky Andradepta on 6/20/2024 at 16:14     CT BRAIN PERFUSION    Result Date: 6/20/2024  EXAMINATION: CTA OF THE HEAD AND NECK WITH CONTRAST; CT OF THE CERVICAL SPINE WITHOUT CONTRAST; CTA OF THE HEAD WITH CONTRAST WITH PERFUSION 6/20/2024 2:15 pm: TECHNIQUE: CTA of the head and neck was performed with the administration of intravenous contrast. Multiplanar reformatted images are provided for review.  MIP images are provided for review. Stenosis of the internal carotid arteries measured using NASCET criteria. Automated exposure control, iterative reconstruction, and/or weight based adjustment of the mA/kV was utilized to reduce the radiation dose to as low as reasonably achievable.; CT of the cervical spine was performed without the administration of intravenous contrast. Multiplanar reformatted images are provided for review. Automated exposure control, iterative reconstruction, and/or weight based adjustment of the mA/kV was utilized to reduce the radiation dose to as low as reasonably achievable.; CTA of the head/brain was performed with the administration of intravenous contrast. Multiplanar reformatted images are provided for review.  MIP images are provided for review. Automated exposure control, iterative reconstruction, and/or weight based adjustment of the mA/kV was utilized to reduce the radiation dose to as low as reasonably achievable. COMPARISON: CT head from earlier today HISTORY: ORDERING SYSTEM PROVIDED HISTORY: stroke TECHNOLOGIST PROVIDED HISTORY: stroke Decision Support Exception - unselect if not a suspected or confirmed emergency medical condition->Emergency Medical Condition (MA); ORDERING SYSTEM PROVIDED HISTORY: stroke,

## 2024-07-03 NOTE — CARE COORDINATION
Called Hiwot [facility]as [facility] to see if patient still meets criteria.  Left Rakesh    Called Patient's brother Ray for SNF choices.  No answer    2293 Called Lay again, she states she will review to see if the patient still meets criteria  Currently patient is not able to participate in therapy as he is not following commands

## 2024-07-04 PROBLEM — I48.21 PERMANENT ATRIAL FIBRILLATION (HCC): Status: ACTIVE | Noted: 2024-07-04

## 2024-07-04 PROBLEM — I48.91 ATRIAL FIBRILLATION (HCC): Status: ACTIVE | Noted: 2024-07-04

## 2024-07-04 PROBLEM — E87.1 HYPONATREMIA: Status: RESOLVED | Noted: 2024-07-04 | Resolved: 2024-07-04

## 2024-07-04 PROBLEM — I48.91 ATRIAL FIBRILLATION (HCC): Status: RESOLVED | Noted: 2024-07-04 | Resolved: 2024-07-04

## 2024-07-04 PROBLEM — M62.82 NON-TRAUMATIC RHABDOMYOLYSIS: Status: RESOLVED | Noted: 2024-06-21 | Resolved: 2024-07-04

## 2024-07-04 PROBLEM — R57.9 SHOCK (HCC): Status: RESOLVED | Noted: 2024-06-21 | Resolved: 2024-07-04

## 2024-07-04 PROBLEM — I51.7 LEFT ATRIAL DILATION: Status: ACTIVE | Noted: 2024-07-04

## 2024-07-04 PROBLEM — I50.20 HFREF (HEART FAILURE WITH REDUCED EJECTION FRACTION) (HCC): Status: ACTIVE | Noted: 2024-07-04

## 2024-07-04 PROBLEM — E87.1 HYPONATREMIA: Status: ACTIVE | Noted: 2024-07-04

## 2024-07-04 PROBLEM — J18.9 PNEUMONIA DUE TO INFECTIOUS ORGANISM: Status: RESOLVED | Noted: 2024-06-21 | Resolved: 2024-07-04

## 2024-07-04 PROBLEM — E11.9 TYPE 2 DIABETES MELLITUS (HCC): Status: ACTIVE | Noted: 2024-07-04

## 2024-07-04 LAB
ANION GAP SERPL CALCULATED.3IONS-SCNC: 11 MMOL/L (ref 9–16)
BUN SERPL-MCNC: 21 MG/DL (ref 8–23)
CALCIUM SERPL-MCNC: 9.3 MG/DL (ref 8.6–10.4)
CHLORIDE SERPL-SCNC: 98 MMOL/L (ref 98–107)
CO2 SERPL-SCNC: 27 MMOL/L (ref 20–31)
CREAT SERPL-MCNC: 0.7 MG/DL (ref 0.7–1.2)
ERYTHROCYTE [DISTWIDTH] IN BLOOD BY AUTOMATED COUNT: 14.5 % (ref 11.8–14.4)
GFR, ESTIMATED: >90 ML/MIN/1.73M2
GLUCOSE BLD-MCNC: 164 MG/DL (ref 75–110)
GLUCOSE BLD-MCNC: 180 MG/DL (ref 75–110)
GLUCOSE BLD-MCNC: 192 MG/DL (ref 75–110)
GLUCOSE BLD-MCNC: 193 MG/DL (ref 75–110)
GLUCOSE BLD-MCNC: 230 MG/DL (ref 75–110)
GLUCOSE SERPL-MCNC: 209 MG/DL (ref 74–99)
HCT VFR BLD AUTO: 40.5 % (ref 40.7–50.3)
HGB BLD-MCNC: 13.1 G/DL (ref 13–17)
MCH RBC QN AUTO: 30.4 PG (ref 25.2–33.5)
MCHC RBC AUTO-ENTMCNC: 32.3 G/DL (ref 28.4–34.8)
MCV RBC AUTO: 94 FL (ref 82.6–102.9)
NRBC BLD-RTO: 0 PER 100 WBC
PLATELET # BLD AUTO: 301 K/UL (ref 138–453)
PMV BLD AUTO: 12.2 FL (ref 8.1–13.5)
POTASSIUM SERPL-SCNC: 4.6 MMOL/L (ref 3.7–5.3)
RBC # BLD AUTO: 4.31 M/UL (ref 4.21–5.77)
SODIUM SERPL-SCNC: 136 MMOL/L (ref 136–145)
TROPONIN I SERPL HS-MCNC: 110 NG/L (ref 0–22)
TROPONIN I SERPL HS-MCNC: 112 NG/L (ref 0–22)
TROPONIN I SERPL HS-MCNC: 126 NG/L (ref 0–22)
WBC OTHER # BLD: 12.4 K/UL (ref 3.5–11.3)

## 2024-07-04 PROCEDURE — 2580000003 HC RX 258: Performed by: REGISTERED NURSE

## 2024-07-04 PROCEDURE — 99231 SBSQ HOSP IP/OBS SF/LOW 25: CPT | Performed by: INTERNAL MEDICINE

## 2024-07-04 PROCEDURE — 2700000000 HC OXYGEN THERAPY PER DAY

## 2024-07-04 PROCEDURE — 84484 ASSAY OF TROPONIN QUANT: CPT

## 2024-07-04 PROCEDURE — 80048 BASIC METABOLIC PNL TOTAL CA: CPT

## 2024-07-04 PROCEDURE — 2580000003 HC RX 258

## 2024-07-04 PROCEDURE — 99222 1ST HOSP IP/OBS MODERATE 55: CPT | Performed by: INTERNAL MEDICINE

## 2024-07-04 PROCEDURE — 85027 COMPLETE CBC AUTOMATED: CPT

## 2024-07-04 PROCEDURE — 99233 SBSQ HOSP IP/OBS HIGH 50: CPT | Performed by: PSYCHIATRY & NEUROLOGY

## 2024-07-04 PROCEDURE — 82947 ASSAY GLUCOSE BLOOD QUANT: CPT

## 2024-07-04 PROCEDURE — 6370000000 HC RX 637 (ALT 250 FOR IP): Performed by: PSYCHIATRY & NEUROLOGY

## 2024-07-04 PROCEDURE — 6370000000 HC RX 637 (ALT 250 FOR IP): Performed by: STUDENT IN AN ORGANIZED HEALTH CARE EDUCATION/TRAINING PROGRAM

## 2024-07-04 PROCEDURE — 2060000000 HC ICU INTERMEDIATE R&B

## 2024-07-04 PROCEDURE — 36415 COLL VENOUS BLD VENIPUNCTURE: CPT

## 2024-07-04 PROCEDURE — 6370000000 HC RX 637 (ALT 250 FOR IP)

## 2024-07-04 PROCEDURE — 94761 N-INVAS EAR/PLS OXIMETRY MLT: CPT

## 2024-07-04 RX ORDER — MIDODRINE HYDROCHLORIDE 5 MG/1
5 TABLET ORAL ONCE
Status: COMPLETED | OUTPATIENT
Start: 2024-07-04 | End: 2024-07-04

## 2024-07-04 RX ORDER — SPIRONOLACTONE 25 MG/1
25 TABLET ORAL DAILY
Status: DISCONTINUED | OUTPATIENT
Start: 2024-07-04 | End: 2024-07-04

## 2024-07-04 RX ORDER — LEVETIRACETAM 500 MG/1
500 TABLET ORAL 2 TIMES DAILY
Status: DISCONTINUED | OUTPATIENT
Start: 2024-07-04 | End: 2024-07-06

## 2024-07-04 RX ORDER — INSULIN GLARGINE 100 [IU]/ML
20 INJECTION, SOLUTION SUBCUTANEOUS DAILY
Status: DISCONTINUED | OUTPATIENT
Start: 2024-07-04 | End: 2024-07-06

## 2024-07-04 RX ORDER — DIVALPROEX SODIUM 500 MG/1
500 TABLET, DELAYED RELEASE ORAL EVERY 12 HOURS SCHEDULED
Status: DISCONTINUED | OUTPATIENT
Start: 2024-07-04 | End: 2024-07-04

## 2024-07-04 RX ORDER — SPIRONOLACTONE 25 MG/1
12.5 TABLET ORAL DAILY
Status: DISCONTINUED | OUTPATIENT
Start: 2024-07-05 | End: 2024-07-10 | Stop reason: HOSPADM

## 2024-07-04 RX ORDER — 0.9 % SODIUM CHLORIDE 0.9 %
500 INTRAVENOUS SOLUTION INTRAVENOUS ONCE
Status: COMPLETED | OUTPATIENT
Start: 2024-07-04 | End: 2024-07-04

## 2024-07-04 RX ADMIN — METOPROLOL TARTRATE 25 MG: 25 TABLET, FILM COATED ORAL at 20:59

## 2024-07-04 RX ADMIN — MODAFINIL 200 MG: 100 TABLET ORAL at 10:04

## 2024-07-04 RX ADMIN — SODIUM CHLORIDE, PRESERVATIVE FREE 10 ML: 5 INJECTION INTRAVENOUS at 10:10

## 2024-07-04 RX ADMIN — SODIUM CHLORIDE 500 ML: 9 INJECTION, SOLUTION INTRAVENOUS at 14:47

## 2024-07-04 RX ADMIN — SACUBITRIL AND VALSARTAN 1 TABLET: 24; 26 TABLET, FILM COATED ORAL at 10:02

## 2024-07-04 RX ADMIN — EMPAGLIFLOZIN 10 MG: 10 TABLET, FILM COATED ORAL at 10:05

## 2024-07-04 RX ADMIN — FUROSEMIDE 20 MG: 20 TABLET ORAL at 10:03

## 2024-07-04 RX ADMIN — INSULIN LISPRO 4 UNITS: 100 INJECTION, SOLUTION INTRAVENOUS; SUBCUTANEOUS at 12:11

## 2024-07-04 RX ADMIN — INSULIN GLARGINE 20 UNITS: 100 INJECTION, SOLUTION SUBCUTANEOUS at 10:00

## 2024-07-04 RX ADMIN — LEVETIRACETAM 500 MG: 500 TABLET, FILM COATED ORAL at 20:59

## 2024-07-04 RX ADMIN — CARBIDOPA AND LEVODOPA 1 TABLET: 25; 100 TABLET ORAL at 14:00

## 2024-07-04 RX ADMIN — DOXAZOSIN 4 MG: 2 TABLET ORAL at 10:04

## 2024-07-04 RX ADMIN — SPIRONOLACTONE 25 MG: 25 TABLET, FILM COATED ORAL at 10:02

## 2024-07-04 RX ADMIN — CARBIDOPA AND LEVODOPA 1 TABLET: 25; 100 TABLET ORAL at 20:59

## 2024-07-04 RX ADMIN — METOPROLOL TARTRATE 25 MG: 25 TABLET, FILM COATED ORAL at 10:04

## 2024-07-04 RX ADMIN — SODIUM CHLORIDE, PRESERVATIVE FREE 10 ML: 5 INJECTION INTRAVENOUS at 20:59

## 2024-07-04 RX ADMIN — ATORVASTATIN CALCIUM 80 MG: 80 TABLET, FILM COATED ORAL at 20:59

## 2024-07-04 RX ADMIN — CARBIDOPA AND LEVODOPA 4 TABLET: 25; 100 TABLET ORAL at 09:00

## 2024-07-04 RX ADMIN — DIGOXIN 125 MCG: 125 TABLET ORAL at 10:03

## 2024-07-04 RX ADMIN — MIDODRINE HYDROCHLORIDE 5 MG: 5 TABLET ORAL at 14:44

## 2024-07-04 ASSESSMENT — PAIN SCALES - GENERAL
PAINLEVEL_OUTOF10: 0
PAINLEVEL_OUTOF10: 0

## 2024-07-05 ENCOUNTER — APPOINTMENT (OUTPATIENT)
Dept: GENERAL RADIOLOGY | Age: 74
DRG: 064 | End: 2024-07-05
Payer: MEDICARE

## 2024-07-05 ENCOUNTER — ANESTHESIA (OUTPATIENT)
Dept: OPERATING ROOM | Age: 74
End: 2024-07-05
Payer: MEDICARE

## 2024-07-05 ENCOUNTER — ANESTHESIA EVENT (OUTPATIENT)
Dept: OPERATING ROOM | Age: 74
End: 2024-07-05
Payer: MEDICARE

## 2024-07-05 ENCOUNTER — APPOINTMENT (OUTPATIENT)
Dept: CT IMAGING | Age: 74
DRG: 064 | End: 2024-07-05
Payer: MEDICARE

## 2024-07-05 LAB
ANION GAP SERPL CALCULATED.3IONS-SCNC: 14 MMOL/L (ref 9–16)
BACTERIA URNS QL MICRO: ABNORMAL
BASOPHILS # BLD: 0.05 K/UL (ref 0–0.2)
BASOPHILS NFR BLD: 0 % (ref 0–2)
BILIRUB UR QL STRIP: NEGATIVE
BUN SERPL-MCNC: 29 MG/DL (ref 8–23)
CALCIUM SERPL-MCNC: 9.2 MG/DL (ref 8.6–10.4)
CASTS #/AREA URNS LPF: ABNORMAL /LPF (ref 0–2)
CASTS #/AREA URNS LPF: ABNORMAL /LPF (ref 0–2)
CHLORIDE SERPL-SCNC: 99 MMOL/L (ref 98–107)
CLARITY UR: ABNORMAL
CO2 SERPL-SCNC: 23 MMOL/L (ref 20–31)
COLOR UR: YELLOW
CREAT SERPL-MCNC: 0.8 MG/DL (ref 0.7–1.2)
EOSINOPHIL # BLD: 0.12 K/UL (ref 0–0.44)
EOSINOPHILS RELATIVE PERCENT: 1 % (ref 1–4)
EPI CELLS #/AREA URNS HPF: ABNORMAL /HPF (ref 0–5)
ERYTHROCYTE [DISTWIDTH] IN BLOOD BY AUTOMATED COUNT: 14.6 % (ref 11.8–14.4)
GFR, ESTIMATED: >90 ML/MIN/1.73M2
GLUCOSE BLD-MCNC: 151 MG/DL (ref 75–110)
GLUCOSE BLD-MCNC: 162 MG/DL (ref 75–110)
GLUCOSE BLD-MCNC: 169 MG/DL (ref 75–110)
GLUCOSE BLD-MCNC: 184 MG/DL (ref 75–110)
GLUCOSE BLD-MCNC: 185 MG/DL (ref 75–110)
GLUCOSE SERPL-MCNC: 176 MG/DL (ref 74–99)
GLUCOSE UR STRIP-MCNC: ABNORMAL MG/DL
HCT VFR BLD AUTO: 43.4 % (ref 40.7–50.3)
HGB BLD-MCNC: 13.9 G/DL (ref 13–17)
HGB UR QL STRIP.AUTO: ABNORMAL
IMM GRANULOCYTES # BLD AUTO: 0.05 K/UL (ref 0–0.3)
IMM GRANULOCYTES NFR BLD: 0 %
KETONES UR STRIP-MCNC: ABNORMAL MG/DL
LEUKOCYTE ESTERASE UR QL STRIP: ABNORMAL
LYMPHOCYTES NFR BLD: 1.07 K/UL (ref 1.1–3.7)
LYMPHOCYTES RELATIVE PERCENT: 9 % (ref 24–43)
MCH RBC QN AUTO: 30.4 PG (ref 25.2–33.5)
MCHC RBC AUTO-ENTMCNC: 32 G/DL (ref 28.4–34.8)
MCV RBC AUTO: 95 FL (ref 82.6–102.9)
MONOCYTES NFR BLD: 0.91 K/UL (ref 0.1–1.2)
MONOCYTES NFR BLD: 7 % (ref 3–12)
NEUTROPHILS NFR BLD: 83 % (ref 36–65)
NEUTS SEG NFR BLD: 10.2 K/UL (ref 1.5–8.1)
NITRITE UR QL STRIP: POSITIVE
NRBC BLD-RTO: 0 PER 100 WBC
PH UR STRIP: 5 [PH] (ref 5–8)
PLATELET # BLD AUTO: 332 K/UL (ref 138–453)
PMV BLD AUTO: 11.6 FL (ref 8.1–13.5)
POTASSIUM SERPL-SCNC: 5.2 MMOL/L (ref 3.7–5.3)
PROT UR STRIP-MCNC: ABNORMAL MG/DL
RBC # BLD AUTO: 4.57 M/UL (ref 4.21–5.77)
RBC # BLD: ABNORMAL 10*6/UL
RBC #/AREA URNS HPF: ABNORMAL /HPF (ref 0–2)
SODIUM SERPL-SCNC: 136 MMOL/L (ref 136–145)
SP GR UR STRIP: 1.03 (ref 1–1.03)
UROBILINOGEN UR STRIP-ACNC: NORMAL EU/DL (ref 0–1)
WBC #/AREA URNS HPF: ABNORMAL /HPF (ref 0–5)
WBC OTHER # BLD: 12.4 K/UL (ref 3.5–11.3)

## 2024-07-05 PROCEDURE — 2500000003 HC RX 250 WO HCPCS

## 2024-07-05 PROCEDURE — 87186 SC STD MICRODIL/AGAR DIL: CPT

## 2024-07-05 PROCEDURE — 0DB68ZX EXCISION OF STOMACH, VIA NATURAL OR ARTIFICIAL OPENING ENDOSCOPIC, DIAGNOSTIC: ICD-10-PCS | Performed by: INTERNAL MEDICINE

## 2024-07-05 PROCEDURE — 85025 COMPLETE CBC W/AUTO DIFF WBC: CPT

## 2024-07-05 PROCEDURE — 99232 SBSQ HOSP IP/OBS MODERATE 35: CPT | Performed by: PSYCHIATRY & NEUROLOGY

## 2024-07-05 PROCEDURE — 87077 CULTURE AEROBIC IDENTIFY: CPT

## 2024-07-05 PROCEDURE — 70450 CT HEAD/BRAIN W/O DYE: CPT

## 2024-07-05 PROCEDURE — 43239 EGD BIOPSY SINGLE/MULTIPLE: CPT | Performed by: INTERNAL MEDICINE

## 2024-07-05 PROCEDURE — 6370000000 HC RX 637 (ALT 250 FOR IP)

## 2024-07-05 PROCEDURE — 88342 IMHCHEM/IMCYTCHM 1ST ANTB: CPT

## 2024-07-05 PROCEDURE — 88305 TISSUE EXAM BY PATHOLOGIST: CPT

## 2024-07-05 PROCEDURE — 7100000001 HC PACU RECOVERY - ADDTL 15 MIN: Performed by: INTERNAL MEDICINE

## 2024-07-05 PROCEDURE — 99232 SBSQ HOSP IP/OBS MODERATE 35: CPT | Performed by: INTERNAL MEDICINE

## 2024-07-05 PROCEDURE — 36415 COLL VENOUS BLD VENIPUNCTURE: CPT

## 2024-07-05 PROCEDURE — 87086 URINE CULTURE/COLONY COUNT: CPT

## 2024-07-05 PROCEDURE — 3700000000 HC ANESTHESIA ATTENDED CARE: Performed by: INTERNAL MEDICINE

## 2024-07-05 PROCEDURE — 2060000000 HC ICU INTERMEDIATE R&B

## 2024-07-05 PROCEDURE — 71045 X-RAY EXAM CHEST 1 VIEW: CPT

## 2024-07-05 PROCEDURE — 87040 BLOOD CULTURE FOR BACTERIA: CPT

## 2024-07-05 PROCEDURE — 80048 BASIC METABOLIC PNL TOTAL CA: CPT

## 2024-07-05 PROCEDURE — 3609012400 HC EGD TRANSORAL BIOPSY SINGLE/MULTIPLE: Performed by: INTERNAL MEDICINE

## 2024-07-05 PROCEDURE — 2580000003 HC RX 258

## 2024-07-05 PROCEDURE — 7100000000 HC PACU RECOVERY - FIRST 15 MIN: Performed by: INTERNAL MEDICINE

## 2024-07-05 PROCEDURE — 82947 ASSAY GLUCOSE BLOOD QUANT: CPT

## 2024-07-05 PROCEDURE — 81001 URINALYSIS AUTO W/SCOPE: CPT

## 2024-07-05 PROCEDURE — 2700000000 HC OXYGEN THERAPY PER DAY

## 2024-07-05 PROCEDURE — 94761 N-INVAS EAR/PLS OXIMETRY MLT: CPT

## 2024-07-05 PROCEDURE — 2709999900 HC NON-CHARGEABLE SUPPLY: Performed by: INTERNAL MEDICINE

## 2024-07-05 PROCEDURE — 6360000002 HC RX W HCPCS

## 2024-07-05 PROCEDURE — 3700000001 HC ADD 15 MINUTES (ANESTHESIA): Performed by: INTERNAL MEDICINE

## 2024-07-05 PROCEDURE — 0DH63UZ INSERTION OF FEEDING DEVICE INTO STOMACH, PERCUTANEOUS APPROACH: ICD-10-PCS | Performed by: INTERNAL MEDICINE

## 2024-07-05 PROCEDURE — 2720000010 HC SURG SUPPLY STERILE: Performed by: INTERNAL MEDICINE

## 2024-07-05 PROCEDURE — 87184 SC STD DISK METHOD PER PLATE: CPT

## 2024-07-05 PROCEDURE — 43246 EGD PLACE GASTROSTOMY TUBE: CPT | Performed by: INTERNAL MEDICINE

## 2024-07-05 PROCEDURE — 3609013300 HC EGD TUBE PLACEMENT: Performed by: INTERNAL MEDICINE

## 2024-07-05 RX ORDER — CEFAZOLIN SODIUM 1 G/3ML
INJECTION, POWDER, FOR SOLUTION INTRAMUSCULAR; INTRAVENOUS PRN
Status: DISCONTINUED | OUTPATIENT
Start: 2024-07-05 | End: 2024-07-05 | Stop reason: SDUPTHER

## 2024-07-05 RX ORDER — SODIUM CHLORIDE 9 MG/ML
INJECTION, SOLUTION INTRAVENOUS PRN
Status: DISCONTINUED | OUTPATIENT
Start: 2024-07-05 | End: 2024-07-05

## 2024-07-05 RX ORDER — NALOXONE HYDROCHLORIDE 0.4 MG/ML
INJECTION, SOLUTION INTRAMUSCULAR; INTRAVENOUS; SUBCUTANEOUS PRN
Status: DISCONTINUED | OUTPATIENT
Start: 2024-07-05 | End: 2024-07-05

## 2024-07-05 RX ORDER — METOPROLOL TARTRATE 1 MG/ML
5 INJECTION, SOLUTION INTRAVENOUS ONCE
Status: COMPLETED | OUTPATIENT
Start: 2024-07-05 | End: 2024-07-05

## 2024-07-05 RX ORDER — SODIUM CHLORIDE 0.9 % (FLUSH) 0.9 %
5-40 SYRINGE (ML) INJECTION EVERY 12 HOURS SCHEDULED
Status: DISCONTINUED | OUTPATIENT
Start: 2024-07-05 | End: 2024-07-05

## 2024-07-05 RX ORDER — METOPROLOL TARTRATE 1 MG/ML
5 INJECTION, SOLUTION INTRAVENOUS EVERY 6 HOURS PRN
Status: DISCONTINUED | OUTPATIENT
Start: 2024-07-05 | End: 2024-07-10 | Stop reason: HOSPADM

## 2024-07-05 RX ORDER — FENTANYL CITRATE 50 UG/ML
25 INJECTION, SOLUTION INTRAMUSCULAR; INTRAVENOUS EVERY 5 MIN PRN
Status: DISCONTINUED | OUTPATIENT
Start: 2024-07-05 | End: 2024-07-05

## 2024-07-05 RX ORDER — SODIUM CHLORIDE 9 MG/ML
INJECTION, SOLUTION INTRAVENOUS CONTINUOUS PRN
Status: DISCONTINUED | OUTPATIENT
Start: 2024-07-05 | End: 2024-07-05 | Stop reason: SDUPTHER

## 2024-07-05 RX ORDER — LEVETIRACETAM 500 MG/5ML
500 INJECTION, SOLUTION, CONCENTRATE INTRAVENOUS EVERY 12 HOURS
Status: DISCONTINUED | OUTPATIENT
Start: 2024-07-06 | End: 2024-07-10 | Stop reason: HOSPADM

## 2024-07-05 RX ORDER — IPRATROPIUM BROMIDE AND ALBUTEROL SULFATE 2.5; .5 MG/3ML; MG/3ML
1 SOLUTION RESPIRATORY (INHALATION)
Status: DISCONTINUED | OUTPATIENT
Start: 2024-07-05 | End: 2024-07-05

## 2024-07-05 RX ORDER — LABETALOL HYDROCHLORIDE 5 MG/ML
10 INJECTION, SOLUTION INTRAVENOUS
Status: DISCONTINUED | OUTPATIENT
Start: 2024-07-05 | End: 2024-07-05

## 2024-07-05 RX ORDER — LIDOCAINE HYDROCHLORIDE 10 MG/ML
INJECTION, SOLUTION EPIDURAL; INFILTRATION; INTRACAUDAL; PERINEURAL PRN
Status: DISCONTINUED | OUTPATIENT
Start: 2024-07-05 | End: 2024-07-05 | Stop reason: SDUPTHER

## 2024-07-05 RX ORDER — SODIUM CHLORIDE 0.9 % (FLUSH) 0.9 %
5-40 SYRINGE (ML) INJECTION PRN
Status: DISCONTINUED | OUTPATIENT
Start: 2024-07-05 | End: 2024-07-05

## 2024-07-05 RX ORDER — HYDRALAZINE HYDROCHLORIDE 20 MG/ML
10 INJECTION INTRAMUSCULAR; INTRAVENOUS
Status: DISCONTINUED | OUTPATIENT
Start: 2024-07-05 | End: 2024-07-05

## 2024-07-05 RX ORDER — FENTANYL CITRATE 50 UG/ML
50 INJECTION, SOLUTION INTRAMUSCULAR; INTRAVENOUS EVERY 5 MIN PRN
Status: DISCONTINUED | OUTPATIENT
Start: 2024-07-05 | End: 2024-07-05

## 2024-07-05 RX ORDER — LEVETIRACETAM 5 MG/ML
500 INJECTION INTRAVASCULAR EVERY 12 HOURS
Status: DISCONTINUED | OUTPATIENT
Start: 2024-07-05 | End: 2024-07-05

## 2024-07-05 RX ORDER — METOCLOPRAMIDE HYDROCHLORIDE 5 MG/ML
10 INJECTION INTRAMUSCULAR; INTRAVENOUS
Status: DISCONTINUED | OUTPATIENT
Start: 2024-07-05 | End: 2024-07-05

## 2024-07-05 RX ORDER — PROCHLORPERAZINE EDISYLATE 5 MG/ML
5 INJECTION INTRAMUSCULAR; INTRAVENOUS
Status: DISCONTINUED | OUTPATIENT
Start: 2024-07-05 | End: 2024-07-05

## 2024-07-05 RX ORDER — LANSOPRAZOLE 30 MG/1
30 TABLET, ORALLY DISINTEGRATING, DELAYED RELEASE ORAL
Status: DISCONTINUED | OUTPATIENT
Start: 2024-07-05 | End: 2024-07-10 | Stop reason: HOSPADM

## 2024-07-05 RX ORDER — PROPOFOL 10 MG/ML
INJECTION, EMULSION INTRAVENOUS PRN
Status: DISCONTINUED | OUTPATIENT
Start: 2024-07-05 | End: 2024-07-05 | Stop reason: SDUPTHER

## 2024-07-05 RX ADMIN — PROPOFOL 20 MG: 10 INJECTION, EMULSION INTRAVENOUS at 09:39

## 2024-07-05 RX ADMIN — PROPOFOL 20 MG: 10 INJECTION, EMULSION INTRAVENOUS at 09:30

## 2024-07-05 RX ADMIN — METOPROLOL TARTRATE 5 MG: 5 INJECTION INTRAVENOUS at 14:47

## 2024-07-05 RX ADMIN — METOPROLOL TARTRATE 5 MG: 5 INJECTION INTRAVENOUS at 06:40

## 2024-07-05 RX ADMIN — SODIUM CHLORIDE: 9 INJECTION, SOLUTION INTRAVENOUS at 09:17

## 2024-07-05 RX ADMIN — LIDOCAINE HYDROCHLORIDE 50 MG: 10 INJECTION, SOLUTION EPIDURAL; INFILTRATION; INTRACAUDAL; PERINEURAL at 09:23

## 2024-07-05 RX ADMIN — PROPOFOL 20 MG: 10 INJECTION, EMULSION INTRAVENOUS at 09:26

## 2024-07-05 RX ADMIN — Medication 1000 MG: at 21:01

## 2024-07-05 RX ADMIN — PROPOFOL 30 MG: 10 INJECTION, EMULSION INTRAVENOUS at 09:23

## 2024-07-05 RX ADMIN — LANSOPRAZOLE 30 MG: 30 TABLET, ORALLY DISINTEGRATING, DELAYED RELEASE ORAL at 14:55

## 2024-07-05 RX ADMIN — PROPOFOL 20 MG: 10 INJECTION, EMULSION INTRAVENOUS at 09:35

## 2024-07-05 RX ADMIN — CEFAZOLIN 2 G: 1 INJECTION, POWDER, FOR SOLUTION INTRAMUSCULAR; INTRAVENOUS at 09:25

## 2024-07-05 ASSESSMENT — PAIN - FUNCTIONAL ASSESSMENT
PAIN_FUNCTIONAL_ASSESSMENT: FACE, LEGS, ACTIVITY, CRY, AND CONSOLABILITY (FLACC)
PAIN_FUNCTIONAL_ASSESSMENT: ADULT NONVERBAL PAIN SCALE (NPVS)

## 2024-07-05 ASSESSMENT — PAIN SCALES - GENERAL
PAINLEVEL_OUTOF10: 0

## 2024-07-05 NOTE — CARE COORDINATION
Transitional Planning  Message left for Hiwot Bloom, to provide an update on patient's status.  Need SNF choices from brother. Plan is for PEG placement.

## 2024-07-05 NOTE — ANESTHESIA POSTPROCEDURE EVALUATION
Department of Anesthesiology  Postprocedure Note    Patient: Timothy Arellano  MRN: [id number]  YOB: 1950  Date of evaluation: 7/5/2024    Procedure Summary       Date: 07/05/24 Room / Location: [address]    Anesthesia Start: 0917 Anesthesia Stop: 1000    Procedures:       2ND CASE: ESOPHAGOGASTRODUODENOSCOPY PERCUTANEOUS ENDOSCOPIC GASTROSTOMY TUBE INSERTION      ESOPHAGOGASTRODUODENOSCOPY BIOPSY Diagnosis:       Dysphagia, unspecified type      (Dysphagia, unspecified type [R13.10])    Surgeons: Tito Armenta MD Responsible Provider: Eliud Huynh MD    Anesthesia Type: MAC ASA Status: 4            Anesthesia Type: No value filed.    So Phase I:      So Phase II:      Anesthesia Post Evaluation    Patient location during evaluation: bedside  Level of consciousness: sleepy but conscious  Airway patency: patent  Nausea & Vomiting: no nausea and no vomiting  Cardiovascular status: blood pressure returned to baseline  Respiratory status: acceptable  Hydration status: euvolemic  Comments: /73   Pulse (!) 104   Temp 97 °F (36.1 °C)   Resp 24   Ht 1.727 m (5' 7.99\")   Wt 119.1 kg (262 lb 9.1 oz)   SpO2 96%   BMI 39.93 kg/m²     Pain management: adequate    No notable events documented.

## 2024-07-05 NOTE — OP NOTE
throughout the procedure.the vital signs remained stable , and no immediate complication form the procedure noted, patient will be ready for d/c when criteria is met .      Findings:    Retropharyngeal area was grossly normal appearing    Esophagus: normal    Stomach:     1 cm ulcer in the fundus of the stomach with dried blood on it measuring less than 1 cm    Gentle biopsies were taken    Gastritis    PEG  placed successfully with no difficulty at all  Peg was placed :after locating a nice spot with one to one finger compression and transillumination,monika area was cleaned with antiseptic and anthesis ed  using Lidocaine 1% , less than 1 cm cut was made, and using the pull kit a 20 Fr PEG tube was placed successfully, a second look was used to confirm good placement, no immediate complications noted.th site was cleaned and antiseptic ointment applied and dressing in a normal fashion done      Duodenum:     Descending: normal    Bulb: normal    The scope was removed and the patient tolerated the procedure well.     Recommendations/Plan:   F/U Biopsies  PPI  May use the PEG for medication today  May use the PEG for feeding tomorrow if there is no sign of complications    Electronically signed by Tito Armenta MD  on 7/5/2024 at 9:40 AM

## 2024-07-05 NOTE — ANESTHESIA PRE PROCEDURE
Department of Anesthesiology  Preprocedure Note       Name:  Timothy Arellano   Age:  73 y.o.  :  1950                                          MRN:  [id number]         Date:  2024      Surgeon: Surgeon(s):  Tito Armenta MD    Procedure: Procedure(s):  2ND CASE: ESOPHAGOGASTRODUODENOSCOPY PERCUTANEOUS ENDOSCOPIC GASTROSTOMY TUBE INSERTION    Medications prior to admission:   Prior to Admission medications    Medication Sig Start Date End Date Taking? Authorizing Provider   furosemide (LASIX) 20 MG tablet Take 1 tablet by mouth daily 24  Yes Santiago Escobar MD   ELIQUIS 5 MG TABS tablet Take 1 tablet by mouth 2 times daily    Santiago Escobar MD   digoxin (LANOXIN) 125 MCG tablet Take 1 tablet by mouth daily    Santiago Escobar MD   insulin glargine (LANTUS) 100 UNIT/ML injection vial Inject 30 Units into the skin nightly    Santiago Escobar MD   alfuzosin (UROXATRAL) 10 MG extended release tablet Take 1 tablet by mouth daily 3/31/23   Francisco Javier Palencia MD   ONETOUCH VERIO strip USE 1 STRIP TO CHECK GLUCOSE ONCE DAILY 20   Santiago Escobar MD   candesartan (ATACAND) 16 MG tablet TAKE 1 TABLET BY MOUTH ONCE DAILY 21   Santiago Escobar MD       Current medications:    Current Facility-Administered Medications   Medication Dose Route Frequency Provider Last Rate Last Admin   • metoprolol tartrate (LOPRESSOR) tablet 25 mg  25 mg Per NG tube BID Sean Edwards MD   25 mg at 24   • insulin glargine (LANTUS) injection vial 20 Units  20 Units SubCUTAneous Daily Jonathan Curry MD   20 Units at 24 1000   • spironolactone (ALDACTONE) tablet 12.5 mg  12.5 mg Oral Daily Chi Francis MD       • carbidopa-levodopa (SINEMET)  MG per tablet 1 tablet  1 tablet Per NG tube TID Jonathan Curry MD   1 tablet at 24   • levETIRAcetam (KEPPRA) tablet 500 mg  500 mg Oral BID Jonathan Curry MD   500 mg at 24   • [Held

## 2024-07-06 ENCOUNTER — APPOINTMENT (OUTPATIENT)
Dept: GENERAL RADIOLOGY | Age: 74
DRG: 064 | End: 2024-07-06
Payer: MEDICARE

## 2024-07-06 ENCOUNTER — APPOINTMENT (OUTPATIENT)
Dept: CT IMAGING | Age: 74
DRG: 064 | End: 2024-07-06
Payer: MEDICARE

## 2024-07-06 PROBLEM — N39.0 COMPLICATED UTI (URINARY TRACT INFECTION): Status: ACTIVE | Noted: 2024-07-06

## 2024-07-06 LAB
ANION GAP SERPL CALCULATED.3IONS-SCNC: 11 MMOL/L (ref 9–16)
BASOPHILS # BLD: 0.05 K/UL (ref 0–0.2)
BASOPHILS NFR BLD: 1 % (ref 0–2)
BODY TEMPERATURE: 37
BUN SERPL-MCNC: 29 MG/DL (ref 8–23)
CALCIUM SERPL-MCNC: 8.8 MG/DL (ref 8.6–10.4)
CHLORIDE SERPL-SCNC: 100 MMOL/L (ref 98–107)
CO2 SERPL-SCNC: 26 MMOL/L (ref 20–31)
COHGB MFR BLD: 2.2 % (ref 0–5)
CREAT SERPL-MCNC: 0.8 MG/DL (ref 0.7–1.2)
CRP SERPL HS-MCNC: 195 MG/L (ref 0–5)
EOSINOPHIL # BLD: 0.22 K/UL (ref 0–0.44)
EOSINOPHILS RELATIVE PERCENT: 2 % (ref 1–4)
ERYTHROCYTE [DISTWIDTH] IN BLOOD BY AUTOMATED COUNT: 14.7 % (ref 11.8–14.4)
FIO2 ON VENT: ABNORMAL %
GFR, ESTIMATED: >90 ML/MIN/1.73M2
GLUCOSE BLD-MCNC: 165 MG/DL (ref 75–110)
GLUCOSE BLD-MCNC: 181 MG/DL (ref 75–110)
GLUCOSE BLD-MCNC: 197 MG/DL (ref 75–110)
GLUCOSE BLD-MCNC: 197 MG/DL (ref 75–110)
GLUCOSE BLD-MCNC: 210 MG/DL (ref 75–110)
GLUCOSE BLD-MCNC: 216 MG/DL (ref 75–110)
GLUCOSE SERPL-MCNC: 199 MG/DL (ref 74–99)
HCO3 VENOUS: 26.1 MMOL/L (ref 24–30)
HCT VFR BLD AUTO: 40.4 % (ref 40.7–50.3)
HGB BLD-MCNC: 12.9 G/DL (ref 13–17)
IMM GRANULOCYTES # BLD AUTO: 0.03 K/UL (ref 0–0.3)
IMM GRANULOCYTES NFR BLD: 0 %
LYMPHOCYTES NFR BLD: 1.16 K/UL (ref 1.1–3.7)
LYMPHOCYTES RELATIVE PERCENT: 13 % (ref 24–43)
MCH RBC QN AUTO: 30.2 PG (ref 25.2–33.5)
MCHC RBC AUTO-ENTMCNC: 31.9 G/DL (ref 28.4–34.8)
MCV RBC AUTO: 94.6 FL (ref 82.6–102.9)
MONOCYTES NFR BLD: 0.66 K/UL (ref 0.1–1.2)
MONOCYTES NFR BLD: 7 % (ref 3–12)
NEUTROPHILS NFR BLD: 77 % (ref 36–65)
NEUTS SEG NFR BLD: 6.98 K/UL (ref 1.5–8.1)
NRBC BLD-RTO: 0 PER 100 WBC
O2 SAT, VEN: 91.9 % (ref 60–85)
PCO2 VENOUS: 41.1 MM HG (ref 39–55)
PH VENOUS: 7.42 (ref 7.32–7.42)
PLATELET # BLD AUTO: 313 K/UL (ref 138–453)
PMV BLD AUTO: 11 FL (ref 8.1–13.5)
PO2 VENOUS: 62.5 MM HG (ref 30–50)
POSITIVE BASE EXCESS, VEN: 2 MMOL/L (ref 0–2)
POTASSIUM SERPL-SCNC: 4.7 MMOL/L (ref 3.7–5.3)
RBC # BLD AUTO: 4.27 M/UL (ref 4.21–5.77)
RBC # BLD: ABNORMAL 10*6/UL
SODIUM SERPL-SCNC: 137 MMOL/L (ref 136–145)
TROPONIN I SERPL HS-MCNC: 174 NG/L (ref 0–22)
TROPONIN I SERPL HS-MCNC: 184 NG/L (ref 0–22)
TROPONIN I SERPL HS-MCNC: 197 NG/L (ref 0–22)
TROPONIN I SERPL HS-MCNC: 199 NG/L (ref 0–22)
WBC OTHER # BLD: 9.1 K/UL (ref 3.5–11.3)

## 2024-07-06 PROCEDURE — 6360000002 HC RX W HCPCS

## 2024-07-06 PROCEDURE — 2060000000 HC ICU INTERMEDIATE R&B

## 2024-07-06 PROCEDURE — 2580000003 HC RX 258

## 2024-07-06 PROCEDURE — 2700000000 HC OXYGEN THERAPY PER DAY

## 2024-07-06 PROCEDURE — 84484 ASSAY OF TROPONIN QUANT: CPT

## 2024-07-06 PROCEDURE — 71045 X-RAY EXAM CHEST 1 VIEW: CPT

## 2024-07-06 PROCEDURE — 99233 SBSQ HOSP IP/OBS HIGH 50: CPT | Performed by: NURSE PRACTITIONER

## 2024-07-06 PROCEDURE — 6370000000 HC RX 637 (ALT 250 FOR IP)

## 2024-07-06 PROCEDURE — 82947 ASSAY GLUCOSE BLOOD QUANT: CPT

## 2024-07-06 PROCEDURE — 94761 N-INVAS EAR/PLS OXIMETRY MLT: CPT

## 2024-07-06 PROCEDURE — 99233 SBSQ HOSP IP/OBS HIGH 50: CPT | Performed by: INTERNAL MEDICINE

## 2024-07-06 PROCEDURE — 99232 SBSQ HOSP IP/OBS MODERATE 35: CPT | Performed by: PSYCHIATRY & NEUROLOGY

## 2024-07-06 PROCEDURE — 86140 C-REACTIVE PROTEIN: CPT

## 2024-07-06 PROCEDURE — 85025 COMPLETE CBC W/AUTO DIFF WBC: CPT

## 2024-07-06 PROCEDURE — 6370000000 HC RX 637 (ALT 250 FOR IP): Performed by: NURSE PRACTITIONER

## 2024-07-06 PROCEDURE — 99231 SBSQ HOSP IP/OBS SF/LOW 25: CPT | Performed by: INTERNAL MEDICINE

## 2024-07-06 PROCEDURE — 82805 BLOOD GASES W/O2 SATURATION: CPT

## 2024-07-06 PROCEDURE — 6360000004 HC RX CONTRAST MEDICATION

## 2024-07-06 PROCEDURE — 80048 BASIC METABOLIC PNL TOTAL CA: CPT

## 2024-07-06 PROCEDURE — 71260 CT THORAX DX C+: CPT

## 2024-07-06 PROCEDURE — 2500000003 HC RX 250 WO HCPCS

## 2024-07-06 PROCEDURE — 99223 1ST HOSP IP/OBS HIGH 75: CPT | Performed by: INTERNAL MEDICINE

## 2024-07-06 PROCEDURE — 36415 COLL VENOUS BLD VENIPUNCTURE: CPT

## 2024-07-06 RX ORDER — ATORVASTATIN CALCIUM 80 MG/1
80 TABLET, FILM COATED ORAL NIGHTLY
Status: DISCONTINUED | OUTPATIENT
Start: 2024-07-06 | End: 2024-07-10 | Stop reason: HOSPADM

## 2024-07-06 RX ORDER — DIGOXIN 125 MCG
125 TABLET ORAL DAILY
Status: DISCONTINUED | OUTPATIENT
Start: 2024-07-06 | End: 2024-07-10 | Stop reason: HOSPADM

## 2024-07-06 RX ORDER — SODIUM CHLORIDE, SODIUM LACTATE, POTASSIUM CHLORIDE, AND CALCIUM CHLORIDE .6; .31; .03; .02 G/100ML; G/100ML; G/100ML; G/100ML
1000 INJECTION, SOLUTION INTRAVENOUS ONCE
Status: DISCONTINUED | OUTPATIENT
Start: 2024-07-06 | End: 2024-07-06

## 2024-07-06 RX ORDER — SODIUM CHLORIDE 9 MG/ML
INJECTION, SOLUTION INTRAVENOUS PRN
Status: DISCONTINUED | OUTPATIENT
Start: 2024-07-06 | End: 2024-07-10 | Stop reason: HOSPADM

## 2024-07-06 RX ORDER — ACETAMINOPHEN 325 MG/1
650 TABLET ORAL EVERY 6 HOURS PRN
Status: DISCONTINUED | OUTPATIENT
Start: 2024-07-06 | End: 2024-07-10 | Stop reason: HOSPADM

## 2024-07-06 RX ORDER — FUROSEMIDE 20 MG/1
20 TABLET ORAL DAILY
Status: DISCONTINUED | OUTPATIENT
Start: 2024-07-06 | End: 2024-07-10 | Stop reason: HOSPADM

## 2024-07-06 RX ORDER — 0.9 % SODIUM CHLORIDE 0.9 %
250 INTRAVENOUS SOLUTION INTRAVENOUS ONCE
Status: COMPLETED | OUTPATIENT
Start: 2024-07-06 | End: 2024-07-06

## 2024-07-06 RX ORDER — HEPARIN SODIUM 10000 [USP'U]/100ML
5-30 INJECTION, SOLUTION INTRAVENOUS CONTINUOUS
Status: CANCELLED | OUTPATIENT
Start: 2024-07-06

## 2024-07-06 RX ORDER — DOXAZOSIN 2 MG/1
4 TABLET ORAL DAILY
Status: DISCONTINUED | OUTPATIENT
Start: 2024-07-06 | End: 2024-07-10 | Stop reason: HOSPADM

## 2024-07-06 RX ORDER — MODAFINIL 100 MG/1
200 TABLET ORAL DAILY
Status: DISCONTINUED | OUTPATIENT
Start: 2024-07-06 | End: 2024-07-08

## 2024-07-06 RX ORDER — INSULIN GLARGINE 100 [IU]/ML
10 INJECTION, SOLUTION SUBCUTANEOUS DAILY
Status: DISCONTINUED | OUTPATIENT
Start: 2024-07-06 | End: 2024-07-07

## 2024-07-06 RX ORDER — POLYETHYLENE GLYCOL 3350 17 G/17G
17 POWDER, FOR SOLUTION ORAL DAILY PRN
Status: DISCONTINUED | OUTPATIENT
Start: 2024-07-06 | End: 2024-07-10 | Stop reason: HOSPADM

## 2024-07-06 RX ORDER — 0.9 % SODIUM CHLORIDE 0.9 %
250 INTRAVENOUS SOLUTION INTRAVENOUS ONCE
Status: DISCONTINUED | OUTPATIENT
Start: 2024-07-06 | End: 2024-07-06

## 2024-07-06 RX ADMIN — SODIUM CHLORIDE 250 ML: 9 INJECTION, SOLUTION INTRAVENOUS at 12:41

## 2024-07-06 RX ADMIN — SPIRONOLACTONE 12.5 MG: 25 TABLET ORAL at 08:44

## 2024-07-06 RX ADMIN — CARBIDOPA AND LEVODOPA 1 TABLET: 25; 100 TABLET ORAL at 21:00

## 2024-07-06 RX ADMIN — INSULIN GLARGINE 10 UNITS: 100 INJECTION, SOLUTION SUBCUTANEOUS at 08:44

## 2024-07-06 RX ADMIN — DOXAZOSIN 4 MG: 2 TABLET ORAL at 08:44

## 2024-07-06 RX ADMIN — LEVETIRACETAM 500 MG: 100 INJECTION INTRAVENOUS at 11:30

## 2024-07-06 RX ADMIN — IOPAMIDOL 75 ML: 755 INJECTION, SOLUTION INTRAVENOUS at 14:37

## 2024-07-06 RX ADMIN — FUROSEMIDE 20 MG: 20 TABLET ORAL at 08:44

## 2024-07-06 RX ADMIN — INSULIN LISPRO 4 UNITS: 100 INJECTION, SOLUTION INTRAVENOUS; SUBCUTANEOUS at 16:49

## 2024-07-06 RX ADMIN — LANSOPRAZOLE 30 MG: 30 TABLET, ORALLY DISINTEGRATING, DELAYED RELEASE ORAL at 08:43

## 2024-07-06 RX ADMIN — MODAFINIL 200 MG: 100 TABLET ORAL at 08:43

## 2024-07-06 RX ADMIN — CARBIDOPA AND LEVODOPA 1 TABLET: 25; 100 TABLET ORAL at 08:44

## 2024-07-06 RX ADMIN — SODIUM CHLORIDE, PRESERVATIVE FREE 10 ML: 5 INJECTION INTRAVENOUS at 08:31

## 2024-07-06 RX ADMIN — METOPROLOL TARTRATE 25 MG: 25 TABLET, FILM COATED ORAL at 20:59

## 2024-07-06 RX ADMIN — ATORVASTATIN CALCIUM 80 MG: 80 TABLET, FILM COATED ORAL at 20:59

## 2024-07-06 RX ADMIN — SODIUM CHLORIDE, PRESERVATIVE FREE 10 ML: 5 INJECTION INTRAVENOUS at 21:06

## 2024-07-06 RX ADMIN — METOPROLOL TARTRATE 25 MG: 25 TABLET, FILM COATED ORAL at 08:46

## 2024-07-06 RX ADMIN — LEVETIRACETAM 500 MG: 100 INJECTION INTRAVENOUS at 00:49

## 2024-07-06 RX ADMIN — ENOXAPARIN SODIUM 30 MG: 100 INJECTION SUBCUTANEOUS at 08:31

## 2024-07-06 RX ADMIN — DIGOXIN 125 MCG: 125 TABLET ORAL at 08:44

## 2024-07-06 RX ADMIN — CEFEPIME 2000 MG: 2 INJECTION, POWDER, FOR SOLUTION INTRAVENOUS at 13:47

## 2024-07-06 RX ADMIN — SACUBITRIL AND VALSARTAN 1 TABLET: 24; 26 TABLET, FILM COATED ORAL at 11:35

## 2024-07-06 RX ADMIN — LEVETIRACETAM 500 MG: 100 INJECTION INTRAVENOUS at 23:45

## 2024-07-06 RX ADMIN — INSULIN LISPRO 4 UNITS: 100 INJECTION, SOLUTION INTRAVENOUS; SUBCUTANEOUS at 11:35

## 2024-07-06 ASSESSMENT — PAIN SCALES - GENERAL
PAINLEVEL_OUTOF10: 0
PAINLEVEL_OUTOF10: 0

## 2024-07-06 NOTE — CARE COORDINATION
PHQ-9 deferred, per internal medicine note on 7/5/24, pt has a history of Parkinson's Disease with Parkinson's Dementia.

## 2024-07-07 LAB
ANION GAP SERPL CALCULATED.3IONS-SCNC: 10 MMOL/L (ref 9–16)
BASOPHILS # BLD: 0.03 K/UL (ref 0–0.2)
BASOPHILS NFR BLD: 0 % (ref 0–2)
BUN SERPL-MCNC: 35 MG/DL (ref 8–23)
CALCIUM SERPL-MCNC: 8.9 MG/DL (ref 8.6–10.4)
CHLORIDE SERPL-SCNC: 103 MMOL/L (ref 98–107)
CO2 SERPL-SCNC: 26 MMOL/L (ref 20–31)
CREAT SERPL-MCNC: 0.7 MG/DL (ref 0.7–1.2)
EOSINOPHIL # BLD: 0.27 K/UL (ref 0–0.44)
EOSINOPHILS RELATIVE PERCENT: 3 % (ref 1–4)
ERYTHROCYTE [DISTWIDTH] IN BLOOD BY AUTOMATED COUNT: 14.8 % (ref 11.8–14.4)
GFR, ESTIMATED: >90 ML/MIN/1.73M2
GLUCOSE BLD-MCNC: 208 MG/DL (ref 75–110)
GLUCOSE BLD-MCNC: 218 MG/DL (ref 75–110)
GLUCOSE BLD-MCNC: 231 MG/DL (ref 75–110)
GLUCOSE BLD-MCNC: 277 MG/DL (ref 75–110)
GLUCOSE SERPL-MCNC: 207 MG/DL (ref 74–99)
HCT VFR BLD AUTO: 40.4 % (ref 40.7–50.3)
HGB BLD-MCNC: 12.7 G/DL (ref 13–17)
IMM GRANULOCYTES # BLD AUTO: 0.03 K/UL (ref 0–0.3)
IMM GRANULOCYTES NFR BLD: 0 %
LYMPHOCYTES NFR BLD: 1.67 K/UL (ref 1.1–3.7)
LYMPHOCYTES RELATIVE PERCENT: 19 % (ref 24–43)
MCH RBC QN AUTO: 30.8 PG (ref 25.2–33.5)
MCHC RBC AUTO-ENTMCNC: 31.4 G/DL (ref 28.4–34.8)
MCV RBC AUTO: 98.1 FL (ref 82.6–102.9)
MICROORGANISM SPEC CULT: ABNORMAL
MONOCYTES NFR BLD: 0.66 K/UL (ref 0.1–1.2)
MONOCYTES NFR BLD: 8 % (ref 3–12)
NEUTROPHILS NFR BLD: 70 % (ref 36–65)
NEUTS SEG NFR BLD: 5.95 K/UL (ref 1.5–8.1)
NRBC BLD-RTO: 0 PER 100 WBC
PLATELET # BLD AUTO: 304 K/UL (ref 138–453)
PMV BLD AUTO: 11 FL (ref 8.1–13.5)
POTASSIUM SERPL-SCNC: 4.1 MMOL/L (ref 3.7–5.3)
RBC # BLD AUTO: 4.12 M/UL (ref 4.21–5.77)
RBC # BLD: ABNORMAL 10*6/UL
SODIUM SERPL-SCNC: 139 MMOL/L (ref 136–145)
SPECIMEN DESCRIPTION: ABNORMAL
WBC OTHER # BLD: 8.6 K/UL (ref 3.5–11.3)

## 2024-07-07 PROCEDURE — 97110 THERAPEUTIC EXERCISES: CPT

## 2024-07-07 PROCEDURE — 95700 EEG CONT REC W/VID EEG TECH: CPT

## 2024-07-07 PROCEDURE — 99232 SBSQ HOSP IP/OBS MODERATE 35: CPT | Performed by: INTERNAL MEDICINE

## 2024-07-07 PROCEDURE — 6370000000 HC RX 637 (ALT 250 FOR IP): Performed by: NURSE PRACTITIONER

## 2024-07-07 PROCEDURE — 51798 US URINE CAPACITY MEASURE: CPT

## 2024-07-07 PROCEDURE — 2580000003 HC RX 258

## 2024-07-07 PROCEDURE — 80048 BASIC METABOLIC PNL TOTAL CA: CPT

## 2024-07-07 PROCEDURE — 97530 THERAPEUTIC ACTIVITIES: CPT

## 2024-07-07 PROCEDURE — 97162 PT EVAL MOD COMPLEX 30 MIN: CPT

## 2024-07-07 PROCEDURE — 2060000000 HC ICU INTERMEDIATE R&B

## 2024-07-07 PROCEDURE — 85025 COMPLETE CBC W/AUTO DIFF WBC: CPT

## 2024-07-07 PROCEDURE — 6370000000 HC RX 637 (ALT 250 FOR IP)

## 2024-07-07 PROCEDURE — 51701 INSERT BLADDER CATHETER: CPT

## 2024-07-07 PROCEDURE — 95714 VEEG EA 12-26 HR UNMNTR: CPT

## 2024-07-07 PROCEDURE — 6360000002 HC RX W HCPCS

## 2024-07-07 PROCEDURE — 99232 SBSQ HOSP IP/OBS MODERATE 35: CPT | Performed by: PSYCHIATRY & NEUROLOGY

## 2024-07-07 PROCEDURE — 82947 ASSAY GLUCOSE BLOOD QUANT: CPT

## 2024-07-07 PROCEDURE — 99233 SBSQ HOSP IP/OBS HIGH 50: CPT | Performed by: NURSE PRACTITIONER

## 2024-07-07 PROCEDURE — 36415 COLL VENOUS BLD VENIPUNCTURE: CPT

## 2024-07-07 RX ORDER — INSULIN GLARGINE 100 [IU]/ML
15 INJECTION, SOLUTION SUBCUTANEOUS DAILY
Status: DISCONTINUED | OUTPATIENT
Start: 2024-07-07 | End: 2024-07-08

## 2024-07-07 RX ORDER — ALBUTEROL SULFATE 2.5 MG/3ML
2.5 SOLUTION RESPIRATORY (INHALATION) EVERY 6 HOURS PRN
Status: DISCONTINUED | OUTPATIENT
Start: 2024-07-07 | End: 2024-07-10 | Stop reason: HOSPADM

## 2024-07-07 RX ADMIN — SODIUM CHLORIDE, PRESERVATIVE FREE 10 ML: 5 INJECTION INTRAVENOUS at 20:48

## 2024-07-07 RX ADMIN — INSULIN LISPRO 4 UNITS: 100 INJECTION, SOLUTION INTRAVENOUS; SUBCUTANEOUS at 05:53

## 2024-07-07 RX ADMIN — METOPROLOL TARTRATE 25 MG: 25 TABLET, FILM COATED ORAL at 09:31

## 2024-07-07 RX ADMIN — FUROSEMIDE 20 MG: 20 TABLET ORAL at 09:32

## 2024-07-07 RX ADMIN — CEFEPIME 2000 MG: 2 INJECTION, POWDER, FOR SOLUTION INTRAVENOUS at 01:18

## 2024-07-07 RX ADMIN — DOXAZOSIN 4 MG: 2 TABLET ORAL at 09:32

## 2024-07-07 RX ADMIN — LANSOPRAZOLE 30 MG: 30 TABLET, ORALLY DISINTEGRATING, DELAYED RELEASE ORAL at 09:31

## 2024-07-07 RX ADMIN — CARBIDOPA AND LEVODOPA 1 TABLET: 25; 100 TABLET ORAL at 20:40

## 2024-07-07 RX ADMIN — SPIRONOLACTONE 12.5 MG: 25 TABLET ORAL at 09:31

## 2024-07-07 RX ADMIN — ATORVASTATIN CALCIUM 80 MG: 80 TABLET, FILM COATED ORAL at 20:40

## 2024-07-07 RX ADMIN — DIGOXIN 125 MCG: 125 TABLET ORAL at 09:32

## 2024-07-07 RX ADMIN — MODAFINIL 200 MG: 100 TABLET ORAL at 09:31

## 2024-07-07 RX ADMIN — INSULIN LISPRO 8 UNITS: 100 INJECTION, SOLUTION INTRAVENOUS; SUBCUTANEOUS at 13:17

## 2024-07-07 RX ADMIN — CARBIDOPA AND LEVODOPA 1 TABLET: 25; 100 TABLET ORAL at 09:31

## 2024-07-07 RX ADMIN — CARBIDOPA AND LEVODOPA 1 TABLET: 25; 100 TABLET ORAL at 14:56

## 2024-07-07 RX ADMIN — LEVETIRACETAM 500 MG: 100 INJECTION INTRAVENOUS at 12:14

## 2024-07-07 RX ADMIN — SODIUM CHLORIDE, PRESERVATIVE FREE 10 ML: 5 INJECTION INTRAVENOUS at 09:32

## 2024-07-07 RX ADMIN — INSULIN LISPRO 4 UNITS: 100 INJECTION, SOLUTION INTRAVENOUS; SUBCUTANEOUS at 18:28

## 2024-07-07 RX ADMIN — METOPROLOL TARTRATE 37.5 MG: 25 TABLET, FILM COATED ORAL at 20:40

## 2024-07-07 RX ADMIN — INSULIN GLARGINE 15 UNITS: 100 INJECTION, SOLUTION SUBCUTANEOUS at 09:37

## 2024-07-07 RX ADMIN — CEFEPIME 2000 MG: 2 INJECTION, POWDER, FOR SOLUTION INTRAVENOUS at 13:19

## 2024-07-07 NOTE — FLOWSHEET NOTE
07/07/24 0924   Urine Assessment   Bladder Scan Volume (mL) 447 mL   Intermittent/Straight Cath (mL) 425 mL   Post Void Cath Residual (mL) 0

## 2024-07-08 ENCOUNTER — APPOINTMENT (OUTPATIENT)
Dept: GENERAL RADIOLOGY | Age: 74
DRG: 064 | End: 2024-07-08
Payer: MEDICARE

## 2024-07-08 PROBLEM — Z51.5 PALLIATIVE CARE ENCOUNTER: Status: ACTIVE | Noted: 2024-07-08

## 2024-07-08 PROBLEM — Z71.89 ACP (ADVANCE CARE PLANNING): Status: ACTIVE | Noted: 2024-07-08

## 2024-07-08 LAB
ANION GAP SERPL CALCULATED.3IONS-SCNC: 14 MMOL/L (ref 9–16)
BASOPHILS # BLD: 0.05 K/UL (ref 0–0.2)
BASOPHILS NFR BLD: 1 % (ref 0–2)
BUN SERPL-MCNC: 29 MG/DL (ref 8–23)
CALCIUM SERPL-MCNC: 9.2 MG/DL (ref 8.6–10.4)
CHLORIDE SERPL-SCNC: 107 MMOL/L (ref 98–107)
CO2 SERPL-SCNC: 21 MMOL/L (ref 20–31)
CREAT SERPL-MCNC: 0.7 MG/DL (ref 0.7–1.2)
EOSINOPHIL # BLD: 0.33 K/UL (ref 0–0.44)
EOSINOPHILS RELATIVE PERCENT: 3 % (ref 1–4)
ERYTHROCYTE [DISTWIDTH] IN BLOOD BY AUTOMATED COUNT: 14.7 % (ref 11.8–14.4)
GFR, ESTIMATED: >90 ML/MIN/1.73M2
GLUCOSE BLD-MCNC: 206 MG/DL (ref 75–110)
GLUCOSE BLD-MCNC: 219 MG/DL (ref 75–110)
GLUCOSE BLD-MCNC: 238 MG/DL (ref 75–110)
GLUCOSE BLD-MCNC: 257 MG/DL (ref 75–110)
GLUCOSE SERPL-MCNC: 221 MG/DL (ref 74–99)
HCT VFR BLD AUTO: 41.2 % (ref 40.7–50.3)
HGB BLD-MCNC: 12.8 G/DL (ref 13–17)
IMM GRANULOCYTES # BLD AUTO: 0.03 K/UL (ref 0–0.3)
IMM GRANULOCYTES NFR BLD: 0 %
LYMPHOCYTES NFR BLD: 2.17 K/UL (ref 1.1–3.7)
LYMPHOCYTES RELATIVE PERCENT: 20 % (ref 24–43)
MCH RBC QN AUTO: 30.7 PG (ref 25.2–33.5)
MCHC RBC AUTO-ENTMCNC: 31.1 G/DL (ref 28.4–34.8)
MCV RBC AUTO: 98.8 FL (ref 82.6–102.9)
MONOCYTES NFR BLD: 0.93 K/UL (ref 0.1–1.2)
MONOCYTES NFR BLD: 9 % (ref 3–12)
NEUTROPHILS NFR BLD: 67 % (ref 36–65)
NEUTS SEG NFR BLD: 7.36 K/UL (ref 1.5–8.1)
NRBC BLD-RTO: 0 PER 100 WBC
PLATELET # BLD AUTO: 306 K/UL (ref 138–453)
PMV BLD AUTO: 10.7 FL (ref 8.1–13.5)
POTASSIUM SERPL-SCNC: 4.6 MMOL/L (ref 3.7–5.3)
RBC # BLD AUTO: 4.17 M/UL (ref 4.21–5.77)
RBC # BLD: ABNORMAL 10*6/UL
SODIUM SERPL-SCNC: 142 MMOL/L (ref 136–145)
WBC OTHER # BLD: 10.9 K/UL (ref 3.5–11.3)

## 2024-07-08 PROCEDURE — 6360000002 HC RX W HCPCS

## 2024-07-08 PROCEDURE — 2580000003 HC RX 258

## 2024-07-08 PROCEDURE — 95714 VEEG EA 12-26 HR UNMNTR: CPT

## 2024-07-08 PROCEDURE — 80048 BASIC METABOLIC PNL TOTAL CA: CPT

## 2024-07-08 PROCEDURE — 82947 ASSAY GLUCOSE BLOOD QUANT: CPT

## 2024-07-08 PROCEDURE — 89050 BODY FLUID CELL COUNT: CPT

## 2024-07-08 PROCEDURE — 6370000000 HC RX 637 (ALT 250 FOR IP)

## 2024-07-08 PROCEDURE — 99223 1ST HOSP IP/OBS HIGH 75: CPT | Performed by: NURSE PRACTITIONER

## 2024-07-08 PROCEDURE — 87205 SMEAR GRAM STAIN: CPT

## 2024-07-08 PROCEDURE — 82945 GLUCOSE OTHER FLUID: CPT

## 2024-07-08 PROCEDURE — 87015 SPECIMEN INFECT AGNT CONCNTJ: CPT

## 2024-07-08 PROCEDURE — 97110 THERAPEUTIC EXERCISES: CPT

## 2024-07-08 PROCEDURE — 84157 ASSAY OF PROTEIN OTHER: CPT

## 2024-07-08 PROCEDURE — 99233 SBSQ HOSP IP/OBS HIGH 50: CPT | Performed by: NURSE PRACTITIONER

## 2024-07-08 PROCEDURE — 99232 SBSQ HOSP IP/OBS MODERATE 35: CPT | Performed by: INTERNAL MEDICINE

## 2024-07-08 PROCEDURE — 6370000000 HC RX 637 (ALT 250 FOR IP): Performed by: NURSE PRACTITIONER

## 2024-07-08 PROCEDURE — 97112 NEUROMUSCULAR REEDUCATION: CPT

## 2024-07-08 PROCEDURE — 87070 CULTURE OTHR SPECIMN AEROBIC: CPT

## 2024-07-08 PROCEDURE — 95720 EEG PHY/QHP EA INCR W/VEEG: CPT | Performed by: PSYCHIATRY & NEUROLOGY

## 2024-07-08 PROCEDURE — 87483 CNS DNA AMP PROBE TYPE 12-25: CPT

## 2024-07-08 PROCEDURE — 2060000000 HC ICU INTERMEDIATE R&B

## 2024-07-08 PROCEDURE — 99233 SBSQ HOSP IP/OBS HIGH 50: CPT | Performed by: PSYCHIATRY & NEUROLOGY

## 2024-07-08 PROCEDURE — 36415 COLL VENOUS BLD VENIPUNCTURE: CPT

## 2024-07-08 PROCEDURE — 85025 COMPLETE CBC W/AUTO DIFF WBC: CPT

## 2024-07-08 PROCEDURE — 74018 RADEX ABDOMEN 1 VIEW: CPT

## 2024-07-08 RX ORDER — AMANTADINE HYDROCHLORIDE 50 MG/5ML
100 SOLUTION ORAL 2 TIMES DAILY
Status: DISCONTINUED | OUTPATIENT
Start: 2024-07-08 | End: 2024-07-10 | Stop reason: HOSPADM

## 2024-07-08 RX ORDER — AMANTADINE HYDROCHLORIDE 100 MG/1
100 CAPSULE, GELATIN COATED ORAL 2 TIMES DAILY
Status: DISCONTINUED | OUTPATIENT
Start: 2024-07-08 | End: 2024-07-08

## 2024-07-08 RX ORDER — INSULIN GLARGINE 100 [IU]/ML
25 INJECTION, SOLUTION SUBCUTANEOUS DAILY
Status: DISCONTINUED | OUTPATIENT
Start: 2024-07-08 | End: 2024-07-09

## 2024-07-08 RX ADMIN — FUROSEMIDE 20 MG: 20 TABLET ORAL at 08:20

## 2024-07-08 RX ADMIN — INSULIN LISPRO 4 UNITS: 100 INJECTION, SOLUTION INTRAVENOUS; SUBCUTANEOUS at 06:03

## 2024-07-08 RX ADMIN — SPIRONOLACTONE 12.5 MG: 25 TABLET ORAL at 08:19

## 2024-07-08 RX ADMIN — SACUBITRIL AND VALSARTAN 0.5 TABLET: 24; 26 TABLET, FILM COATED ORAL at 20:58

## 2024-07-08 RX ADMIN — LEVETIRACETAM 500 MG: 100 INJECTION INTRAVENOUS at 00:15

## 2024-07-08 RX ADMIN — SODIUM CHLORIDE, PRESERVATIVE FREE 10 ML: 5 INJECTION INTRAVENOUS at 20:59

## 2024-07-08 RX ADMIN — Medication 1000 MG: at 09:49

## 2024-07-08 RX ADMIN — MODAFINIL 200 MG: 100 TABLET ORAL at 08:20

## 2024-07-08 RX ADMIN — SODIUM CHLORIDE, PRESERVATIVE FREE 10 ML: 5 INJECTION INTRAVENOUS at 08:20

## 2024-07-08 RX ADMIN — AMANTADINE HYDROCHLORIDE 100 MG: 50 SOLUTION ORAL at 18:55

## 2024-07-08 RX ADMIN — AMANTADINE HYDROCHLORIDE 100 MG: 100 CAPSULE ORAL at 12:22

## 2024-07-08 RX ADMIN — METOPROLOL TARTRATE 37.5 MG: 25 TABLET, FILM COATED ORAL at 20:28

## 2024-07-08 RX ADMIN — DIGOXIN 125 MCG: 125 TABLET ORAL at 08:20

## 2024-07-08 RX ADMIN — METOPROLOL TARTRATE 37.5 MG: 25 TABLET, FILM COATED ORAL at 08:20

## 2024-07-08 RX ADMIN — CARBIDOPA AND LEVODOPA 1 TABLET: 25; 100 TABLET ORAL at 14:42

## 2024-07-08 RX ADMIN — CARBIDOPA AND LEVODOPA 1 TABLET: 25; 100 TABLET ORAL at 08:20

## 2024-07-08 RX ADMIN — LANSOPRAZOLE 30 MG: 30 TABLET, ORALLY DISINTEGRATING, DELAYED RELEASE ORAL at 08:21

## 2024-07-08 RX ADMIN — INSULIN LISPRO 4 UNITS: 100 INJECTION, SOLUTION INTRAVENOUS; SUBCUTANEOUS at 18:13

## 2024-07-08 RX ADMIN — INSULIN LISPRO 4 UNITS: 100 INJECTION, SOLUTION INTRAVENOUS; SUBCUTANEOUS at 00:15

## 2024-07-08 RX ADMIN — SACUBITRIL AND VALSARTAN 0.5 TABLET: 24; 26 TABLET, FILM COATED ORAL at 09:51

## 2024-07-08 RX ADMIN — CARBIDOPA AND LEVODOPA 1 TABLET: 25; 100 TABLET ORAL at 20:28

## 2024-07-08 RX ADMIN — DOXAZOSIN 4 MG: 2 TABLET ORAL at 08:21

## 2024-07-08 RX ADMIN — INSULIN LISPRO 8 UNITS: 100 INJECTION, SOLUTION INTRAVENOUS; SUBCUTANEOUS at 11:33

## 2024-07-08 RX ADMIN — CEFEPIME 2000 MG: 2 INJECTION, POWDER, FOR SOLUTION INTRAVENOUS at 01:10

## 2024-07-08 RX ADMIN — INSULIN GLARGINE 25 UNITS: 100 INJECTION, SOLUTION SUBCUTANEOUS at 08:20

## 2024-07-08 RX ADMIN — ATORVASTATIN CALCIUM 80 MG: 80 TABLET, FILM COATED ORAL at 20:29

## 2024-07-08 RX ADMIN — LEVETIRACETAM 500 MG: 100 INJECTION INTRAVENOUS at 12:22

## 2024-07-08 ASSESSMENT — PAIN SCALES - GENERAL: PAINLEVEL_OUTOF10: 0

## 2024-07-08 NOTE — CONSULTS
Nephrology Consult Note    Reason for Consult: Rhabdomyolysis and elevated creatinine  Requesting Physician:  Rosette Muhammad MD     Chief Complaint: Status post CVA  History Obtained From:  electronic medical record    History of Present Illness:              This is a 73 y.o. male who has a long complicated past medical history.  He has a history of atrial fibrillation for which she is on Eliquis, he has a history of congestive cardiac failure, longstanding diabetes, hypertension as well as hyperlipidemia.  Patient also suffers from Parkinson disease.  Patient is a  and he lives all by himself.  As per nurses patient was not seen by his neighbors for 48 hours and his garbage can was also at the curb.  Which made his neighbor concerned and EMS was called.  Patient was brought into ER.  At ER patient was in atrial fibrillation with RVR, patient was not responding to physical stimuli and he was intubated to protect his airway.  Patient underwent imaging studies which shows subacute left MCA stroke, he also underwent CTA which shows moderate-sized acute left MCA infarct without any hemorrhage, CT of chest and abdomen which shows bilateral pleural effusion.  On further workup he found to have a creatinine of 1.6 as well as elevated CK.  An elevated CK.  The pattern of his CKs are as follows:  .   Latest Reference Range & Units 06/20/24 19:51 06/21/24 01:34 06/21/24 04:12   Total CK 39 - 308 U/L 20,973 (H) 19,016 (H) 17,403 (H)     Currently patient is receiving IV fluids at 200 mL/h.  Since admission he is positive close to 6 L.  His blood cultures are positive for gram-positive cocci.  Patient also has concern for aspiration pneumonia and on Unasyn  Due to elevated CK levels nephrology was consulted.  His urine is dark in color.       Past Medical History:    Atrial fibrillation  Hypertension  Parkinson's disease      Past Surgical History:    Fracture of right ankle, his steel plates were 
    [facility]  Consultation Note     .  Chief Complaint:  Found down  Subacute left MCA stroke M2 territory    Reason for consult:    PEG tube placement    History of present illness: This is a 73 y.o. male with PMH including A-fib-on Eliquis, who presented after being found down, Pt was found to have left MCA M2 subacute stroke. CT scan yesterday showed worsening of left stroke w/hemorrhagic conversion. Pt had been on Heparin but now this is held  Pt has been to sleepy to participate w/SLP for therapy and actual swallow eval.   GI was consulted for PEG tube placement    Labs show slight hyponatremia with sodium of 133  LFTs unremarkable in the past  CBC shows slight leukocytosis today with WBCs of 11.8, hemoglobin stable 12.5    Previous GI history:   No previous EGD or colonoscopy on file in [facility] or Care Everywhere  Unknown personal or family GI malignancy history  Family at bedside to inquire on further history or social information    Past Medical/Social/Family History:  See above  Previous records/history/ and notes were reviewed    Allergies:  Allergies   Allergen Reactions    Ciprofloxacin     Sulfa Antibiotics        Home medications:  Prior to Admission medications    Medication Sig Start Date End Date Taking? Authorizing Provider   furosemide (LASIX) 20 MG tablet Take 1 tablet by mouth daily 5/29/24  Yes Santiago Escobar MD   ELIQUIS 5 MG TABS tablet Take 1 tablet by mouth 2 times daily    Santiago Escobar MD   digoxin (LANOXIN) 125 MCG tablet Take 1 tablet by mouth daily    Santiago Escobar MD   insulin glargine (LANTUS) 100 UNIT/ML injection vial Inject 30 Units into the skin nightly    ProviderSantiago MD   alfuzosin (UROXATRAL) 10 MG extended release tablet Take 1 tablet by mouth daily 3/31/23   Francisco Javier Palencia MD   ONETOUCH VERIO strip USE 1 STRIP TO CHECK GLUCOSE ONCE DAILY 11/2/20   Santiago Escobar MD   candesartan (ATACAND) 16 MG tablet TAKE 1 TABLET 
  [facility]     Department of Internal Medicine - Staff Internal Medicine Teaching Service          CONSULT  NOTE / HISTORY AND PHYSICAL EXAMINATION   Date: 7/4/2024  Patient Name: Timothy Arellano  Date of admission: 6/20/2024  1:50 PM  YOB: 1950  PCP: Kevyn Castillo MD  History Obtained From:  Chart review    Consult Reason:     Consult Reason: This patient was brought to us for medical management of Type II Diabetes Mellitus and Hypertension.     HISTORY OF PRESENTING ILLNESS     The patient is a pleasant 73 y.o. male was found unconscious and unresponsive at his home and taken to an outside facility for further evaluation. The patient was found to have A-fib with RVR and systolic bp of 140, unresponsive to painful stimuli. He was diagnosed with a subacute left MCA stroke and transferred to [facility].  Patient was admitted under Neurology ICU. CT scan at the time showed left MCA stroke.  Patient got intubated due to poor GCS and was started on Zosyn for concern of aspiration pneumonia.       Last Echo-   Left Ventricle: Moderately reduced left ventricular systolic function with a visually estimated EF of 35 - 40%. Left ventricle size is normal. Mildly increased wall thickness. Moderate global hypokinesis present. See diagram for wall motion findings.    Aortic Valve: Trileaflet valve. Focal calcification is seen on the NCC. Mild regurgitation.    Mitral Valve: Mild regurgitation.    Tricuspid Valve: Moderately elevated RVSP, consistent with moderate pulmonary hypertension. The estimated RVSP is 55 mmHg.    Left Atrium: Left atrium is moderately dilated.    Right Atrium: Right atrium is moderately dilated.    Image quality is fair. Technically difficult study due to patient's body habitus.    Last lipid profile (6/21)  Chol - 126  HDL - 36  LDL - 66  TG - 120  VLDL - 24    Last TSH (6/26)  Tsh - 2.23    Last HbA1C (6/21)  9.5%    Hospital Course:    Timothy 
Attestation signed by      Attending Physician Statement:    I have discussed the care of  Timothy Arellano , including pertinent history and exam findings, with the Cardiology fellow/resident.     I have seen and examined the patient and the key elements of all parts of the encounter have been performed by me. I agree with the assessment, plan and orders as documented by the fellow/resident, after I modified exam findings and plan of treatments, and the final version is my approved version of the assessment.     Additional Comments: Acute LMCA stroke with hemorrhagic transformation. Afib with RVR. Home meds list eliquis. ? Non-compliance or failure Cardiomyopathy. HS trop elevation. Anticoagulation per neurology. Continue digoxin and metoprolol. Continue lasix- home medication. Will hold off adding Jardiance due to concern of infection and patient not able to clean after urination. Add aldactone 12.5mg po qday. If BP and potassium remains stable in next few day, add Entresto 24/26 1/2 tablet po BID    Dc Pasha Martinez MD           [facility]   Admission Note                 Patient's name:  Timothy Arellano  Medical Record Number: [id number]  Patient's account/billing number: [id number]  Patient's YOB: 1950  Age: 73 y.o.  Date of Admission: 6/20/2024  1:50 PM  Date of History and Physical Examination: 7/4/2024  Primary Care Physician: Kevyn Castillo MD    Code Status: Full Code    CHIEF COMPLAINT: Acute left MCA stroke with evidence of hemorrhagic transformation  CONSULT REASON: New onset heart failure with reduced ejection 35-40%    HISTORY OF PRESENT ILLNESS:      History was obtained from chart review and the patient.      Timothy Arellano is a 73 y.o. with past medical history of atrial fibrillation on Eliquis and digoxin as home medication, type 2 diabetes, hypertension dyslipidemia who was initially found down and was taken to the [facility].  Stat CT head and CTA was completed.  The 
Comprehensive Nutrition Assessment    Type and Reason for Visit:  Consult    Nutrition Recommendations/Plan:   Start Peptide Based TF at 20 ml/hr and increase by 15 mL/hr q 4 hours to a goal rate of 45 mL/hr continuous + 1 protein modular with current rate of propofol to provide 1400 kcal, 107 gm protein, 875 mL free water. Suggest free water flushes 50 mL q 4 hours to provide total 1175 mL water/day.  If propofol d/c, recommend Peptide Based TF 60 mL/hr with 1 protein modular per day.  Obtain actual body weight as able  Monitor hemodynamic stability, TF initiation, tolerance, adequacy, weight, labs, GI status, fluid status, skin integrity.     Malnutrition Assessment:  Malnutrition Status:  At risk for malnutrition (Comment) (intubation, acute stroke) (06/21/24 1108)    Context:  Acute Illness     Findings of the 6 clinical characteristics of malnutrition:  Energy Intake:  Unable to assess  Weight Loss:  No significant weight loss     Body Fat Loss:  No significant body fat loss     Muscle Mass Loss:  No significant muscle mass loss (visual)    Fluid Accumulation:  No significant fluid accumulation     Strength:  Not Performed    Nutrition Assessment:    72 yo M found down by neighbor. Adm acute ishemic L MCA stroke, PNA, shock. No PMH on file. Intubated, sedated. Propofol at 18.4 mL/hr at time of visit. Vasopressin, Phenylephrine. Appears well nourished. Glu 178 mg/dL, BUN 26 mg/dL, Cr 1.3 mg/dL.    Nutrition Related Findings:    Meds/labs reviewed Wound Type: Pressure Injury, Stage I (L hip)       Current Nutrition Intake & Therapies:    Average Meal Intake: NPO  Average Supplements Intake: NPO  Diet NPO  Additional Calorie Sources:  Propofol at 18.4 mL/hr = 485 kcal/d    Anthropometric Measures:  Height: 172.7 cm (5' 7.99\")  Ideal Body Weight (IBW): 154 lbs (70 kg)    Current Body Weight: 122.5 kg (270 lb 1 oz), 175.4 % IBW. Weight Source: Other (Comment) (estimated)  Current BMI (kg/m2): 41.1  BMI 
Comprehensive Nutrition Assessment    Type and Reason for Visit:  Reassess, Consult (TF recs)    Nutrition Recommendations/Plan:   For tube feeding, recommend Diabetic (Glucerna 1.5) with a goal rate of 55 ml/hr. At goal volume (1320 ml/day), provides 1980 kcals, 109 gm protein, 1002 ml free water.  Recommend Wound Healing ONS BID.  To monitor nutrition intake/tolerance, labs, weight, plan of care.     Malnutrition Assessment:  Malnutrition Status:  At risk for malnutrition (Comment) (intubation, acute stroke) (06/21/24 1108)    Context:  Acute Illness     Findings of the 6 clinical characteristics of malnutrition:  Energy Intake:  Unable to assess  Weight Loss:  No significant weight loss     Body Fat Loss:  No significant body fat loss     Muscle Mass Loss:  No significant muscle mass loss (visual)    Fluid Accumulation:  No significant fluid accumulation     Strength:  Not Performed    Nutrition Assessment:    Patient is only responsive to pain per RN documentation. Patient has been NPO for at least 2 days. S/P PEG (G-tube) placement 7/5. Received order for tube feed recommendation and management. New documented weight of 119.1 kg (7/3)- would indicate 12% significant weight loss, if accurate. Per chart, no nausea/emesis, last BM 7/5. Labs include: POC -210 mg/dL (past 24 hrs). Meds include: lantus.    Nutrition Related Findings:    +2 generalized edema and to extremities per chart. Wound Type: Pressure Injury (hip)       Current Nutrition Intake & Therapies:    Average Meal Intake: NPO  Average Supplements Intake: NPO  No diet orders on file    Anthropometric Measures:  Height: 172.7 cm (5' 7.99\")  Ideal Body Weight (IBW): 154 lbs (70 kg)       Current Body Weight: 119.7 kg (263 lb 14.3 oz), 171.4 % IBW. Weight Source: Bed Scale  Current BMI (kg/m2): 40.1        Weight Adjustment For: No Adjustment                 BMI Categories: Obese Class 3 (BMI 40.0 or greater)    Estimated Daily Nutrient 
Palliative care evaluation was not done due to Palliative Care Referral also being received.  Provider Tala NP saw patient in consultation today.  Please see consult for Palliative Care on 7/8/24.     [facility] Palliative Care Coordinator  Rosa DENSONN, RN, ONN-[facility] [phone]/ [facility] [phone]/ [facility] [phone]   
    CT THORACIC SPINE BONY RECONSTRUCTION    Result Date: 6/20/2024  1. No acute traumatic injury involving the chest, abdomen or pelvis. 2. Small bilateral pleural effusions with lower lobe atelectasis. 3. Patchy bilateral upper lobe infiltrates left greater than right.  This could represent edema or pneumonia.  Follow-up to resolution is recommended. 4. Cholelithiasis. 5. Diverticulosis. 6. Enlarged prostate gland. 7. No acute osseous abnormality involving the thoracic or lumbar spine.     CT LUMBAR SPINE BONY RECONSTRUCTION    Result Date: 6/20/2024  1. No acute traumatic injury involving the chest, abdomen or pelvis. 2. Small bilateral pleural effusions with lower lobe atelectasis. 3. Patchy bilateral upper lobe infiltrates left greater than right.  This could represent edema or pneumonia.  Follow-up to resolution is recommended. 4. Cholelithiasis. 5. Diverticulosis. 6. Enlarged prostate gland. 7. No acute osseous abnormality involving the thoracic or lumbar spine.     CT HEAD WO CONTRAST    Result Date: 6/20/2024  Moderate sized acute left MCA territory infarct. No acute intracranial hemorrhage. Large left subgaleal/scalp fluid collection could represent a subacute hematoma. Critical results were called by Dr. Frank Martin MD to Jacky Aguirre on 6/20/2024 at 16:14     CT BRAIN PERFUSION    Result Date: 6/20/2024  1. Acute to subacute infarction in the left MCA territory.  31 mL perfusion mismatch in the left frontal parietal lobes. 2. Occlusion in the proximal and mid M2 segment of the left MCA. 3. Focal short segment occlusion at the origin of the left vertebral artery. 4. 70% stenosis at the origin of the right vertebral artery. 5. Moderate bilateral pleural effusions.  Airspace opacities in the bilateral lungs, likely related to mild pulmonary edema versus less likely pneumonia. 6. No fracture or subluxation in the cervical spine.  Degenerative disc disease, most pronounced at C6-7.     CTA HEAD NECK W 
alone  Importance of earlene/Confucianist/spiritual beliefs: [] Very [] Somewhat [] Not   Psychological Distress: mild  Does patient understand diagnosis/treatment? no  Does caregiver understand diagnosis/treatment? yes    Assessment        REVIEW OF SYSTEMS  Unable to obtain - patient unable to answer verbal questions, does not follow commands    PHYSICAL ASSESSMENT:  General Appearance: unresponsive, ill appearing, and in no acute distress  Mental status: unable to assess  Head: normocephalic, atraumatic  Eye: no icterus, redness, pupils equal and reactive, extraocular eye movements intact, conjunctiva clear  Ear: normal external ear, no discharge, hearing intact  Nose: no drainage noted  Mouth: mucous membranes moist  Neck: supple,  Lungs: Bilateral equal air entry, clear to ausculation, no wheezing, rales or rhonchi, normal effort  Cardiovascular: normal rate, regular rhythm, no murmur, gallop, rub  Abdomen: Soft, nontender, nondistended, normal bowel sounds, PEG   Neurologic: unresponsive, opens eyes to voice at times, no response to commands   Skin: No gross lesions, rashes, bruising or bleeding on exposed skin area  Extremities: peripheral pulses palpable, no pedal edema or calf pain with palpation  Psych: unable to assess    Palliative Performance Scale:  ___100% Full ambulation; normal activity/No disease; full self-care; normal intake; LOC full  ___90% full ambulation; normal activity/some disease; full self-care; normal intake; LOC full  ___80% ambulation full; normal activity with effort/some disease; full self-care; normal/reduced intake; LOC full  ___70% ambulation reduced; cannot do normal work/some disease; full self-care; normal/reduce intake; LOC full  ___60%  Ambulation reduced; Significant disease; Can't do hobbies/housework; intake normal or reduced; occasional assist; LOC full/confusion  ___50%  Mainly sit/lie; Extensive disease; Can't do any work; Considerable assist; intake normal or reduced; LOC 
abnormality. Small bilateral mastoid effusions. SOFT TISSUES/SKULL:  No acute abnormality of the visualized skull or soft tissues.  Partially visualized nasogastric and endotracheal tubes.     1.  Evolving left MCA territory infarction with mildly increased foci of hemorrhage in the infarct territory. 2.  Stable 3 mm rightward midline shift. Discussed with NILAY Wolf by Dr. Cowan at 2:17 am on 7/1/2024       Medical Decision [id number]:     Results       Procedure Component Value Units Date/Time    Culture, Urine [[id number]] Collected: 07/05/24 2255    Order Status: Sent Specimen: Urine, clean catch Updated: 07/05/24 2255    Culture, Blood 1 [[id number]] Collected: 07/05/24 1417    Order Status: Completed Specimen: Blood Updated: 07/06/24 0245     Specimen Description .BLOOD     Special Requests RIGHT HAND     Culture NO GROWTH 12 HOURS    Culture, Blood 1 [[id number]] Collected: 07/05/24 1358    Order Status: Completed Specimen: Blood Updated: 07/06/24 0241     Specimen Description .BLOOD     Special Requests LEFT HAND .5ML     Culture NO GROWTH 12 HOURS              Medical Decision Making-Other:     Note:    Thank you for allowing us to participate in the care of this patient. Please call with questions.    Noe Garcia MD  Office: [phone]

## 2024-07-08 NOTE — CARE COORDINATION
Transitional Planning  Called [facility], spoke with [facility], patient is meeting LTACH criteria and able to accept back when medically stable.      Spoke with brother Ray, [phone], agrees with [facility].

## 2024-07-09 LAB
ANION GAP SERPL CALCULATED.3IONS-SCNC: 12 MMOL/L (ref 9–16)
APPEARANCE CSF: CLEAR
BASOPHILS # BLD: 0.04 K/UL (ref 0–0.2)
BASOPHILS NFR BLD: 1 % (ref 0–2)
BUN SERPL-MCNC: 27 MG/DL (ref 8–23)
C GATTII+NEOFOR DNA CSF QL NAA+NON-PROBE: NOT DETECTED
CALCIUM SERPL-MCNC: 9.2 MG/DL (ref 8.6–10.4)
CHLORIDE SERPL-SCNC: 104 MMOL/L (ref 98–107)
CLOT CHECK: ABNORMAL
CMV DNA CSF QL NAA+NON-PROBE: NOT DETECTED
CO2 SERPL-SCNC: 25 MMOL/L (ref 20–31)
COLOR CSF: YELLOW
CREAT SERPL-MCNC: 0.7 MG/DL (ref 0.7–1.2)
E COLI K1 DNA CSF QL NAA+NON-PROBE: NOT DETECTED
EOSINOPHIL # BLD: 0.34 K/UL (ref 0–0.44)
EOSINOPHILS RELATIVE PERCENT: 4 % (ref 1–4)
ERYTHROCYTE [DISTWIDTH] IN BLOOD BY AUTOMATED COUNT: 14.7 % (ref 11.8–14.4)
EV RNA CSF QL NAA+NON-PROBE: NOT DETECTED
GFR, ESTIMATED: >90 ML/MIN/1.73M2
GLUCOSE BLD-MCNC: 209 MG/DL (ref 75–110)
GLUCOSE BLD-MCNC: 233 MG/DL (ref 75–110)
GLUCOSE BLD-MCNC: 240 MG/DL (ref 75–110)
GLUCOSE BLD-MCNC: 242 MG/DL (ref 75–110)
GLUCOSE CSF-MCNC: 138 MG/DL (ref 40–70)
GLUCOSE SERPL-MCNC: 242 MG/DL (ref 74–99)
GP B STREP DNA CSF QL NAA+NON-PROBE: NOT DETECTED
HAEM INFLU DNA CSF QL NAA+NON-PROBE: NOT DETECTED
HCT VFR BLD AUTO: 40.9 % (ref 40.7–50.3)
HGB BLD-MCNC: 13.1 G/DL (ref 13–17)
HHV6 DNA CSF QL NAA+NON-PROBE: NOT DETECTED
HSV1 DNA CSF QL NAA+NON-PROBE: NOT DETECTED
HSV2 DNA CSF QL NAA+NON-PROBE: NOT DETECTED
IMM GRANULOCYTES # BLD AUTO: 0.04 K/UL (ref 0–0.3)
IMM GRANULOCYTES NFR BLD: 1 %
L MONOCYTOG DNA CSF QL NAA+NON-PROBE: NOT DETECTED
LYMPHOCYTES NFR BLD: 1.94 K/UL (ref 1.1–3.7)
LYMPHOCYTES RELATIVE PERCENT: 23 % (ref 24–43)
MCH RBC QN AUTO: 31.2 PG (ref 25.2–33.5)
MCHC RBC AUTO-ENTMCNC: 32 G/DL (ref 28.4–34.8)
MCV RBC AUTO: 97.4 FL (ref 82.6–102.9)
MONOCYTES NFR BLD: 0.6 K/UL (ref 0.1–1.2)
MONOCYTES NFR BLD: 7 % (ref 3–12)
N MEN DNA CSF QL NAA+NON-PROBE: NOT DETECTED
NEUTROPHILS NFR BLD: 64 % (ref 36–65)
NEUTS SEG NFR BLD: 5.35 K/UL (ref 1.5–8.1)
NRBC BLD-RTO: 0 PER 100 WBC
PARECHOVIRUS A RNA CSF QL NAA+NON-PROBE: NOT DETECTED
PLATELET # BLD AUTO: 271 K/UL (ref 138–453)
PMV BLD AUTO: 10.7 FL (ref 8.1–13.5)
POTASSIUM SERPL-SCNC: 4.9 MMOL/L (ref 3.7–5.3)
PROT CSF-MCNC: 239 MG/DL (ref 15–45)
RBC # BLD AUTO: 4.2 M/UL (ref 4.21–5.77)
RBC # BLD: ABNORMAL 10*6/UL
RBC # FLD MANUAL: 2 CELLS/UL
S PNEUM DNA CSF QL NAA+NON-PROBE: NOT DETECTED
SODIUM SERPL-SCNC: 141 MMOL/L (ref 136–145)
SPECIMEN DESCRIPTION: NORMAL
SPECIMEN VOL CSF: 13 ML
TOTAL CELLS COUNTED BRONCH: 4 CELLS/UL (ref 0–5)
TUBE # CSF: 3
VZV DNA CSF QL NAA+NON-PROBE: NOT DETECTED
WBC OTHER # BLD: 8.3 K/UL (ref 3.5–11.3)
XANTHOCHROMIA CSF QL: PRESENT

## 2024-07-09 PROCEDURE — 6370000000 HC RX 637 (ALT 250 FOR IP): Performed by: NURSE PRACTITIONER

## 2024-07-09 PROCEDURE — 94761 N-INVAS EAR/PLS OXIMETRY MLT: CPT

## 2024-07-09 PROCEDURE — 6370000000 HC RX 637 (ALT 250 FOR IP)

## 2024-07-09 PROCEDURE — 95720 EEG PHY/QHP EA INCR W/VEEG: CPT | Performed by: PSYCHIATRY & NEUROLOGY

## 2024-07-09 PROCEDURE — 99232 SBSQ HOSP IP/OBS MODERATE 35: CPT | Performed by: INTERNAL MEDICINE

## 2024-07-09 PROCEDURE — 6360000002 HC RX W HCPCS

## 2024-07-09 PROCEDURE — 99232 SBSQ HOSP IP/OBS MODERATE 35: CPT | Performed by: PSYCHIATRY & NEUROLOGY

## 2024-07-09 PROCEDURE — 2060000000 HC ICU INTERMEDIATE R&B

## 2024-07-09 PROCEDURE — 82947 ASSAY GLUCOSE BLOOD QUANT: CPT

## 2024-07-09 PROCEDURE — 99233 SBSQ HOSP IP/OBS HIGH 50: CPT | Performed by: NURSE PRACTITIONER

## 2024-07-09 PROCEDURE — 36415 COLL VENOUS BLD VENIPUNCTURE: CPT

## 2024-07-09 PROCEDURE — 2700000000 HC OXYGEN THERAPY PER DAY

## 2024-07-09 PROCEDURE — 80048 BASIC METABOLIC PNL TOTAL CA: CPT

## 2024-07-09 PROCEDURE — 85025 COMPLETE CBC W/AUTO DIFF WBC: CPT

## 2024-07-09 PROCEDURE — 009U3ZX DRAINAGE OF SPINAL CANAL, PERCUTANEOUS APPROACH, DIAGNOSTIC: ICD-10-PCS | Performed by: PSYCHIATRY & NEUROLOGY

## 2024-07-09 PROCEDURE — 95714 VEEG EA 12-26 HR UNMNTR: CPT

## 2024-07-09 PROCEDURE — 2580000003 HC RX 258

## 2024-07-09 RX ORDER — INSULIN GLARGINE 100 [IU]/ML
30 INJECTION, SOLUTION SUBCUTANEOUS DAILY
Status: DISCONTINUED | OUTPATIENT
Start: 2024-07-09 | End: 2024-07-10

## 2024-07-09 RX ADMIN — LEVETIRACETAM 500 MG: 100 INJECTION INTRAVENOUS at 11:51

## 2024-07-09 RX ADMIN — METOPROLOL TARTRATE 37.5 MG: 25 TABLET, FILM COATED ORAL at 20:17

## 2024-07-09 RX ADMIN — SACUBITRIL AND VALSARTAN 0.5 TABLET: 24; 26 TABLET, FILM COATED ORAL at 08:02

## 2024-07-09 RX ADMIN — DIGOXIN 125 MCG: 125 TABLET ORAL at 08:04

## 2024-07-09 RX ADMIN — DOXAZOSIN 4 MG: 2 TABLET ORAL at 08:01

## 2024-07-09 RX ADMIN — SPIRONOLACTONE 12.5 MG: 25 TABLET ORAL at 08:01

## 2024-07-09 RX ADMIN — AMANTADINE HYDROCHLORIDE 100 MG: 50 SOLUTION ORAL at 08:27

## 2024-07-09 RX ADMIN — FUROSEMIDE 20 MG: 20 TABLET ORAL at 08:01

## 2024-07-09 RX ADMIN — CARBIDOPA AND LEVODOPA 1 TABLET: 25; 100 TABLET ORAL at 20:16

## 2024-07-09 RX ADMIN — SODIUM CHLORIDE, PRESERVATIVE FREE 10 ML: 5 INJECTION INTRAVENOUS at 20:18

## 2024-07-09 RX ADMIN — CARBIDOPA AND LEVODOPA 1 TABLET: 25; 100 TABLET ORAL at 13:38

## 2024-07-09 RX ADMIN — CARBIDOPA AND LEVODOPA 1 TABLET: 25; 100 TABLET ORAL at 08:04

## 2024-07-09 RX ADMIN — METOPROLOL TARTRATE 37.5 MG: 25 TABLET, FILM COATED ORAL at 08:02

## 2024-07-09 RX ADMIN — ATORVASTATIN CALCIUM 80 MG: 80 TABLET, FILM COATED ORAL at 20:17

## 2024-07-09 RX ADMIN — INSULIN LISPRO 4 UNITS: 100 INJECTION, SOLUTION INTRAVENOUS; SUBCUTANEOUS at 11:50

## 2024-07-09 RX ADMIN — LANSOPRAZOLE 30 MG: 30 TABLET, ORALLY DISINTEGRATING, DELAYED RELEASE ORAL at 08:01

## 2024-07-09 RX ADMIN — INSULIN LISPRO 4 UNITS: 100 INJECTION, SOLUTION INTRAVENOUS; SUBCUTANEOUS at 07:40

## 2024-07-09 RX ADMIN — AMANTADINE HYDROCHLORIDE 100 MG: 50 SOLUTION ORAL at 16:41

## 2024-07-09 RX ADMIN — SODIUM CHLORIDE, PRESERVATIVE FREE 10 ML: 5 INJECTION INTRAVENOUS at 08:05

## 2024-07-09 RX ADMIN — INSULIN LISPRO 4 UNITS: 100 INJECTION, SOLUTION INTRAVENOUS; SUBCUTANEOUS at 17:30

## 2024-07-09 RX ADMIN — LEVETIRACETAM 500 MG: 100 INJECTION INTRAVENOUS at 00:06

## 2024-07-09 RX ADMIN — INSULIN GLARGINE 30 UNITS: 100 INJECTION, SOLUTION SUBCUTANEOUS at 08:02

## 2024-07-09 RX ADMIN — Medication 1000 MG: at 08:02

## 2024-07-09 RX ADMIN — SACUBITRIL AND VALSARTAN 0.5 TABLET: 24; 26 TABLET, FILM COATED ORAL at 20:16

## 2024-07-09 ASSESSMENT — PAIN SCALES - GENERAL
PAINLEVEL_OUTOF10: 0

## 2024-07-10 VITALS
OXYGEN SATURATION: 95 % | SYSTOLIC BLOOD PRESSURE: 103 MMHG | TEMPERATURE: 98.6 F | RESPIRATION RATE: 18 BRPM | BODY MASS INDEX: 39.79 KG/M2 | HEIGHT: 68 IN | HEART RATE: 73 BPM | WEIGHT: 262.57 LBS | DIASTOLIC BLOOD PRESSURE: 76 MMHG

## 2024-07-10 LAB
ANION GAP SERPL CALCULATED.3IONS-SCNC: 11 MMOL/L (ref 9–16)
BASOPHILS # BLD: 0.06 K/UL (ref 0–0.2)
BASOPHILS NFR BLD: 1 % (ref 0–2)
BUN SERPL-MCNC: 25 MG/DL (ref 8–23)
CALCIUM SERPL-MCNC: 9 MG/DL (ref 8.6–10.4)
CHLORIDE SERPL-SCNC: 103 MMOL/L (ref 98–107)
CO2 SERPL-SCNC: 26 MMOL/L (ref 20–31)
CREAT SERPL-MCNC: 0.6 MG/DL (ref 0.7–1.2)
EOSINOPHIL # BLD: 0.34 K/UL (ref 0–0.44)
EOSINOPHILS RELATIVE PERCENT: 4 % (ref 1–4)
ERYTHROCYTE [DISTWIDTH] IN BLOOD BY AUTOMATED COUNT: 14.5 % (ref 11.8–14.4)
GFR, ESTIMATED: >90 ML/MIN/1.73M2
GLUCOSE BLD-MCNC: 208 MG/DL (ref 75–110)
GLUCOSE BLD-MCNC: 219 MG/DL (ref 75–110)
GLUCOSE BLD-MCNC: 229 MG/DL (ref 75–110)
GLUCOSE BLD-MCNC: 231 MG/DL (ref 75–110)
GLUCOSE BLD-MCNC: 236 MG/DL (ref 75–110)
GLUCOSE BLD-MCNC: 250 MG/DL (ref 75–110)
GLUCOSE SERPL-MCNC: 171 MG/DL (ref 74–99)
HCT VFR BLD AUTO: 42.8 % (ref 40.7–50.3)
HGB BLD-MCNC: 13.8 G/DL (ref 13–17)
IMM GRANULOCYTES # BLD AUTO: 0.04 K/UL (ref 0–0.3)
IMM GRANULOCYTES NFR BLD: 0 %
LYMPHOCYTES NFR BLD: 2.58 K/UL (ref 1.1–3.7)
LYMPHOCYTES RELATIVE PERCENT: 26 % (ref 24–43)
MCH RBC QN AUTO: 31.2 PG (ref 25.2–33.5)
MCHC RBC AUTO-ENTMCNC: 32.2 G/DL (ref 28.4–34.8)
MCV RBC AUTO: 96.6 FL (ref 82.6–102.9)
MICROORGANISM SPEC CULT: NORMAL
MICROORGANISM SPEC CULT: NORMAL
MONOCYTES NFR BLD: 0.7 K/UL (ref 0.1–1.2)
MONOCYTES NFR BLD: 7 % (ref 3–12)
NEUTROPHILS NFR BLD: 62 % (ref 36–65)
NEUTS SEG NFR BLD: 6.06 K/UL (ref 1.5–8.1)
NRBC BLD-RTO: 0 PER 100 WBC
PLATELET # BLD AUTO: 250 K/UL (ref 138–453)
PMV BLD AUTO: 10.8 FL (ref 8.1–13.5)
POTASSIUM SERPL-SCNC: 4.6 MMOL/L (ref 3.7–5.3)
RBC # BLD AUTO: 4.43 M/UL (ref 4.21–5.77)
RBC # BLD: ABNORMAL 10*6/UL
SERVICE CMNT-IMP: NORMAL
SERVICE CMNT-IMP: NORMAL
SODIUM SERPL-SCNC: 140 MMOL/L (ref 136–145)
SPECIMEN DESCRIPTION: NORMAL
SPECIMEN DESCRIPTION: NORMAL
SURGICAL PATHOLOGY REPORT: NORMAL
WBC OTHER # BLD: 9.8 K/UL (ref 3.5–11.3)

## 2024-07-10 PROCEDURE — 6360000002 HC RX W HCPCS

## 2024-07-10 PROCEDURE — 6370000000 HC RX 637 (ALT 250 FOR IP)

## 2024-07-10 PROCEDURE — 80048 BASIC METABOLIC PNL TOTAL CA: CPT

## 2024-07-10 PROCEDURE — 82947 ASSAY GLUCOSE BLOOD QUANT: CPT

## 2024-07-10 PROCEDURE — 95711 VEEG 2-12 HR UNMONITORED: CPT

## 2024-07-10 PROCEDURE — 36415 COLL VENOUS BLD VENIPUNCTURE: CPT

## 2024-07-10 PROCEDURE — 2580000003 HC RX 258

## 2024-07-10 PROCEDURE — 99232 SBSQ HOSP IP/OBS MODERATE 35: CPT | Performed by: INTERNAL MEDICINE

## 2024-07-10 PROCEDURE — 6370000000 HC RX 637 (ALT 250 FOR IP): Performed by: NURSE PRACTITIONER

## 2024-07-10 PROCEDURE — 95718 EEG PHYS/QHP 2-12 HR W/VEEG: CPT | Performed by: PSYCHIATRY & NEUROLOGY

## 2024-07-10 PROCEDURE — 99239 HOSP IP/OBS DSCHRG MGMT >30: CPT | Performed by: PSYCHIATRY & NEUROLOGY

## 2024-07-10 PROCEDURE — 95720 EEG PHY/QHP EA INCR W/VEEG: CPT | Performed by: PSYCHIATRY & NEUROLOGY

## 2024-07-10 PROCEDURE — 85025 COMPLETE CBC W/AUTO DIFF WBC: CPT

## 2024-07-10 RX ORDER — ATORVASTATIN CALCIUM 80 MG/1
80 TABLET, FILM COATED ORAL NIGHTLY
Qty: 30 TABLET | Refills: 3 | Status: ON HOLD | OUTPATIENT
Start: 2024-07-10

## 2024-07-10 RX ORDER — INSULIN GLARGINE 100 [IU]/ML
35 INJECTION, SOLUTION SUBCUTANEOUS DAILY
Status: DISCONTINUED | OUTPATIENT
Start: 2024-07-10 | End: 2024-07-10 | Stop reason: HOSPADM

## 2024-07-10 RX ORDER — AMANTADINE HYDROCHLORIDE 50 MG/5ML
100 SOLUTION ORAL 2 TIMES DAILY
Qty: 30 ML | Refills: 1 | Status: ON HOLD | OUTPATIENT
Start: 2024-07-10

## 2024-07-10 RX ADMIN — Medication 1000 MG: at 07:35

## 2024-07-10 RX ADMIN — LEVETIRACETAM 500 MG: 100 INJECTION INTRAVENOUS at 12:17

## 2024-07-10 RX ADMIN — METOPROLOL TARTRATE 37.5 MG: 25 TABLET, FILM COATED ORAL at 07:32

## 2024-07-10 RX ADMIN — INSULIN LISPRO 4 UNITS: 100 INJECTION, SOLUTION INTRAVENOUS; SUBCUTANEOUS at 00:06

## 2024-07-10 RX ADMIN — AMANTADINE HYDROCHLORIDE 100 MG: 50 SOLUTION ORAL at 07:35

## 2024-07-10 RX ADMIN — CARBIDOPA AND LEVODOPA 1 TABLET: 25; 100 TABLET ORAL at 13:53

## 2024-07-10 RX ADMIN — CARBIDOPA AND LEVODOPA 1 TABLET: 25; 100 TABLET ORAL at 07:30

## 2024-07-10 RX ADMIN — FUROSEMIDE 20 MG: 20 TABLET ORAL at 07:29

## 2024-07-10 RX ADMIN — DOXAZOSIN 4 MG: 2 TABLET ORAL at 07:30

## 2024-07-10 RX ADMIN — LANSOPRAZOLE 30 MG: 30 TABLET, ORALLY DISINTEGRATING, DELAYED RELEASE ORAL at 07:31

## 2024-07-10 RX ADMIN — INSULIN LISPRO 4 UNITS: 100 INJECTION, SOLUTION INTRAVENOUS; SUBCUTANEOUS at 04:09

## 2024-07-10 RX ADMIN — LEVETIRACETAM 500 MG: 100 INJECTION INTRAVENOUS at 00:07

## 2024-07-10 RX ADMIN — INSULIN GLARGINE 35 UNITS: 100 INJECTION, SOLUTION SUBCUTANEOUS at 09:46

## 2024-07-10 RX ADMIN — AMANTADINE HYDROCHLORIDE 100 MG: 50 SOLUTION ORAL at 18:31

## 2024-07-10 RX ADMIN — SACUBITRIL AND VALSARTAN 0.5 TABLET: 24; 26 TABLET, FILM COATED ORAL at 07:31

## 2024-07-10 RX ADMIN — INSULIN LISPRO 8 UNITS: 100 INJECTION, SOLUTION INTRAVENOUS; SUBCUTANEOUS at 12:17

## 2024-07-10 RX ADMIN — SPIRONOLACTONE 12.5 MG: 25 TABLET ORAL at 07:29

## 2024-07-10 RX ADMIN — INSULIN LISPRO 4 UNITS: 100 INJECTION, SOLUTION INTRAVENOUS; SUBCUTANEOUS at 17:34

## 2024-07-10 RX ADMIN — DIGOXIN 125 MCG: 125 TABLET ORAL at 07:30

## 2024-07-10 RX ADMIN — SODIUM CHLORIDE, PRESERVATIVE FREE 10 ML: 5 INJECTION INTRAVENOUS at 07:36

## 2024-07-10 ASSESSMENT — PAIN SCALES - GENERAL
PAINLEVEL_OUTOF10: 0

## 2024-07-10 NOTE — PLAN OF CARE
Problem: Discharge Planning  Goal: Discharge to home or other facility with appropriate resources  7/2/2024 2122 by Megan Horvath RN  Outcome: Progressing  7/2/2024 1850 by Grace Melvin RN  Outcome: Progressing     Problem: Respiratory - Adult  Goal: Achieves optimal ventilation and oxygenation  7/2/2024 2122 by Megan Horvath RN  Outcome: Progressing  7/2/2024 1850 by Grace Melvin RN  Outcome: Progressing     Problem: Safety - Adult  Goal: Free from fall injury  7/2/2024 2122 by Megan Horvath RN  Outcome: Progressing  7/2/2024 1850 by Grace Melvin RN  Outcome: Progressing     Problem: Pain  Goal: Verbalizes/displays adequate comfort level or baseline comfort level  7/2/2024 2122 by Megan Horvath RN  Outcome: Progressing  7/2/2024 1850 by Grace Melvin RN  Outcome: Progressing     Problem: Chronic Conditions and Co-morbidities  Goal: Patient's chronic conditions and co-morbidity symptoms are monitored and maintained or improved  7/2/2024 2122 by Megan Horvath RN  Outcome: Progressing  7/2/2024 1850 by Grace Melvin RN  Outcome: Progressing     Problem: Nutrition Deficit:  Goal: Optimize nutritional status  7/2/2024 2122 by Megan Horvath RN  Outcome: Progressing  7/2/2024 1850 by Grace Melvin RN  Outcome: Progressing     Problem: Skin/Tissue Integrity  Goal: Absence of new skin breakdown  Description: 1.  Monitor for areas of redness and/or skin breakdown  2.  Assess vascular access sites hourly  3.  Every 4-6 hours minimum:  Change oxygen saturation probe site  4.  Every 4-6 hours:  If on nasal continuous positive airway pressure, respiratory therapy assess nares and determine need for appliance change or resting period.  7/2/2024 2122 by Megan Horvath RN  Outcome: Progressing  7/2/2024 1850 by Grace Melvin RN  Outcome: Progressing     Problem: ABCDS Injury Assessment  Goal: Absence of physical injury  7/2/2024 2122 by Megan Horvath RN  Outcome: Progressing  7/2/2024 1850 by Grace Melvin 
  Problem: Discharge Planning  Goal: Discharge to home or other facility with appropriate resources  7/4/2024 0504 by Klarissa Weaver RN  Outcome: Progressing  Flowsheets (Taken 7/3/2024 2225)  Discharge to home or other facility with appropriate resources: Identify barriers to discharge with patient and caregiver     Problem: Respiratory - Adult  Goal: Achieves optimal ventilation and oxygenation  7/4/2024 0504 by Klarissa Weaver RN  Outcome: Progressing  Flowsheets (Taken 7/3/2024 2225)  Achieves optimal ventilation and oxygenation: Assess for changes in respiratory status     Problem: Safety - Adult  Goal: Free from fall injury  7/4/2024 0504 by Klarissa Weaver RN  Outcome: Progressing  Flowsheets (Taken 7/3/2024 2225)  Free From Fall Injury: Instruct family/caregiver on patient safety     Problem: Pain  Goal: Verbalizes/displays adequate comfort level or baseline comfort level  7/4/2024 0504 by Klarissa Weaver RN  Outcome: Progressing     Problem: Chronic Conditions and Co-morbidities  Goal: Patient's chronic conditions and co-morbidity symptoms are monitored and maintained or improved  7/4/2024 0504 by Klarissa Weaver RN  Outcome: Progressing  Flowsheets (Taken 7/3/2024 2225)  Care Plan - Patient's Chronic Conditions and Co-Morbidity Symptoms are Monitored and Maintained or Improved: Monitor and assess patient's chronic conditions and comorbid symptoms for stability, deterioration, or improvement     Problem: Nutrition Deficit:  Goal: Optimize nutritional status  7/4/2024 0504 by Klarissa Weaver RN  Outcome: Progressing     Problem: Skin/Tissue Integrity  Goal: Absence of new skin breakdown  Description: 1.  Monitor for areas of redness and/or skin breakdown  2.  Assess vascular access sites hourly  3.  Every 4-6 hours minimum:  Change oxygen saturation probe site  4.  Every 4-6 hours:  If on nasal continuous positive airway pressure, respiratory therapy assess nares and determine need 
  Problem: Discharge Planning  Goal: Discharge to home or other facility with appropriate resources  7/5/2024 0630 by Sarah Madison RN  Outcome: Progressing  7/4/2024 1818 by Davina Apodaca RN  Outcome: Progressing  Flowsheets (Taken 7/3/2024 2225 by Klarissa Weaver, RN)  Discharge to home or other facility with appropriate resources: Identify barriers to discharge with patient and caregiver     Problem: Respiratory - Adult  Goal: Achieves optimal ventilation and oxygenation  7/5/2024 0630 by Sarah Madison RN  Outcome: Progressing  7/4/2024 1818 by Davina Apodaca RN  Outcome: Progressing  Flowsheets (Taken 7/3/2024 2225 by Klarissa Weaver, RN)  Achieves optimal ventilation and oxygenation: Assess for changes in respiratory status     Problem: Safety - Adult  Goal: Free from fall injury  7/4/2024 1818 by Davina Apodaca RN  Outcome: Progressing     Problem: Pain  Goal: Verbalizes/displays adequate comfort level or baseline comfort level  7/4/2024 1818 by Davina Apodaca RN  Outcome: Progressing     Problem: Chronic Conditions and Co-morbidities  Goal: Patient's chronic conditions and co-morbidity symptoms are monitored and maintained or improved  7/4/2024 1818 by Davina Apodaca RN  Outcome: Progressing     Problem: Nutrition Deficit:  Goal: Optimize nutritional status  7/4/2024 1818 by Davina Apodaca RN  Outcome: Progressing     Problem: Skin/Tissue Integrity  Goal: Absence of new skin breakdown  Description: 1.  Monitor for areas of redness and/or skin breakdown  2.  Assess vascular access sites hourly  3.  Every 4-6 hours minimum:  Change oxygen saturation probe site  4.  Every 4-6 hours:  If on nasal continuous positive airway pressure, respiratory therapy assess nares and determine need for appliance change or resting period.  7/4/2024 1818 by Davina Apodaca RN  Outcome: Progressing     Problem: ABCDS Injury Assessment  Goal: Absence of physical 
  Problem: Discharge Planning  Goal: Discharge to home or other facility with appropriate resources  7/5/2024 1821 by Jen Sin RN  Outcome: Progressing  7/5/2024 0630 by Sarah Madison RN  Outcome: Progressing     Problem: Respiratory - Adult  Goal: Achieves optimal ventilation and oxygenation  7/5/2024 1821 by Jen Sin RN  Outcome: Progressing  7/5/2024 0630 by Sarah Madison RN  Outcome: Progressing     Problem: Safety - Adult  Goal: Free from fall injury  Outcome: Progressing     Problem: Pain  Goal: Verbalizes/displays adequate comfort level or baseline comfort level  Outcome: Progressing     Problem: Chronic Conditions and Co-morbidities  Goal: Patient's chronic conditions and co-morbidity symptoms are monitored and maintained or improved  Outcome: Progressing     Problem: Nutrition Deficit:  Goal: Optimize nutritional status  Outcome: Progressing     Problem: Skin/Tissue Integrity  Goal: Absence of new skin breakdown  Description: 1.  Monitor for areas of redness and/or skin breakdown  2.  Assess vascular access sites hourly  3.  Every 4-6 hours minimum:  Change oxygen saturation probe site  4.  Every 4-6 hours:  If on nasal continuous positive airway pressure, respiratory therapy assess nares and determine need for appliance change or resting period.  Outcome: Progressing     Problem: ABCDS Injury Assessment  Goal: Absence of physical injury  Outcome: Progressing     Problem: Safety - Medical Restraint  Goal: Remains free of injury from restraints (Restraint for Interference with Medical Device)  Description: INTERVENTIONS:  1. Determine that other, less restrictive measures have been tried or would not be effective before applying the restraint  2. Evaluate the patient's condition at the time of restraint application  3. Inform patient/family regarding the reason for restraint  4. Q2H: Monitor safety, psychosocial status, comfort, nutrition and hydration  7/5/2024 1821 by January 
  Problem: Discharge Planning  Goal: Discharge to home or other facility with appropriate resources  7/6/2024 0601 by Sarah Madison RN  Outcome: Progressing  7/5/2024 1821 by Jen Sin RN  Outcome: Progressing     Problem: Respiratory - Adult  Goal: Achieves optimal ventilation and oxygenation  7/6/2024 0601 by Sarah Madison RN  Outcome: Progressing  7/5/2024 1821 by Jen Sin RN  Outcome: Progressing     Problem: Safety - Adult  Goal: Free from fall injury  7/6/2024 0601 by Sarah Madison RN  Outcome: Progressing  7/5/2024 1821 by Jen Sin RN  Outcome: Progressing     Problem: Pain  Goal: Verbalizes/displays adequate comfort level or baseline comfort level  7/6/2024 0601 by Sarah Madison RN  Outcome: Progressing  7/5/2024 1821 by Jen Sin RN  Outcome: Progressing     Problem: Chronic Conditions and Co-morbidities  Goal: Patient's chronic conditions and co-morbidity symptoms are monitored and maintained or improved  7/5/2024 1821 by Jen Sin RN  Outcome: Progressing     Problem: Nutrition Deficit:  Goal: Optimize nutritional status  7/5/2024 1821 by Jen Sin RN  Outcome: Progressing     Problem: Skin/Tissue Integrity  Goal: Absence of new skin breakdown  Description: 1.  Monitor for areas of redness and/or skin breakdown  2.  Assess vascular access sites hourly  3.  Every 4-6 hours minimum:  Change oxygen saturation probe site  4.  Every 4-6 hours:  If on nasal continuous positive airway pressure, respiratory therapy assess nares and determine need for appliance change or resting period.  7/5/2024 1821 by Jen Sin RN  Outcome: Progressing     Problem: ABCDS Injury Assessment  Goal: Absence of physical injury  7/5/2024 1821 by Jen Sin RN  Outcome: Progressing     Problem: Safety - Medical Restraint  Goal: Remains free of injury from restraints (Restraint for Interference with Medical Device)  Description: INTERVENTIONS:  1. 
  Problem: Discharge Planning  Goal: Discharge to home or other facility with appropriate resources  Outcome: Adequate for Discharge  Flowsheets (Taken 7/10/2024 0800)  Discharge to home or other facility with appropriate resources:   Identify barriers to discharge with patient and caregiver   Arrange for needed discharge resources and transportation as appropriate   Identify discharge learning needs (meds, wound care, etc)   Refer to discharge planning if patient needs post-hospital services based on physician order or complex needs related to functional status, cognitive ability or social support system     Problem: Respiratory - Adult  Goal: Achieves optimal ventilation and oxygenation  7/10/2024 1827 by Yudith Dent, RN  Outcome: Adequate for Discharge  Flowsheets (Taken 7/10/2024 0800)  Achieves optimal ventilation and oxygenation:   Assess for changes in respiratory status   Assess for changes in mentation and behavior   Position to facilitate oxygenation and minimize respiratory effort   Oxygen supplementation based on oxygen saturation or arterial blood gases   Initiate smoking cessation protocol as indicated   Assess the need for suctioning and aspirate as needed   Assess and instruct to report shortness of breath or any respiratory difficulty   Respiratory therapy support as indicated  7/10/2024 0511 by Rajni Ivy RN  Outcome: Progressing  7/10/2024 0510 by Rajni Ivy RN  Outcome: Progressing  7/10/2024 0510 by Rajni Ivy RN  Outcome: Progressing     Problem: Safety - Adult  Goal: Free from fall injury  Outcome: Adequate for Discharge  Flowsheets (Taken 7/10/2024 0706)  Free From Fall Injury: Instruct family/caregiver on patient safety     Problem: Pain  Goal: Verbalizes/displays adequate comfort level or baseline comfort level  Outcome: Adequate for Discharge     Problem: Chronic Conditions and Co-morbidities  Goal: Patient's chronic conditions and co-morbidity symptoms are 
  Problem: Discharge Planning  Goal: Discharge to home or other facility with appropriate resources  Outcome: Not Progressing  Flowsheets (Taken 6/26/2024 2000)  Discharge to home or other facility with appropriate resources: Refer to discharge planning if patient needs post-hospital services based on physician order or complex needs related to functional status, cognitive ability or social support system     Problem: Respiratory - Adult  Goal: Achieves optimal ventilation and oxygenation  6/27/2024 0500 by Macie Dasilva RN  Outcome: Progressing  6/27/2024 0409 by Dhara Baesley RCP  Outcome: Progressing  Flowsheets  Taken 6/27/2024 0409 by Dhara Beasley RCP  Achieves optimal ventilation and oxygenation:   Assess for changes in respiratory status   Assess the need for suctioning and aspirate as needed   Respiratory therapy support as indicated   Oxygen supplementation based on oxygen saturation or arterial blood gases  Taken 6/26/2024 2000 by Macie Dasilva RN  Achieves optimal ventilation and oxygenation:   Assess for changes in respiratory status   Position to facilitate oxygenation and minimize respiratory effort   Oxygen supplementation based on oxygen saturation or arterial blood gases   Assess the need for suctioning and aspirate as needed   Respiratory therapy support as indicated     Problem: Safety - Adult  Goal: Free from fall injury  6/27/2024 0500 by Macie Dasilva RN  Outcome: Progressing  6/26/2024 1839 by Aggie Beltran RN  Outcome: Progressing     Problem: Pain  Goal: Verbalizes/displays adequate comfort level or baseline comfort level  6/27/2024 0500 by Macie Dasilva RN  Outcome: Progressing  Flowsheets  Taken 6/27/2024 0400  Verbalizes/displays adequate comfort level or baseline comfort level:   Assess pain using appropriate pain scale   Administer analgesics based on type and severity of pain and evaluate response   Implement non-pharmacological measures as appropriate and evaluate 
  Problem: Discharge Planning  Goal: Discharge to home or other facility with appropriate resources  Outcome: Progressing     Problem: Respiratory - Adult  Goal: Achieves optimal ventilation and oxygenation  6/30/2024 0406 by Autumn Degroot RN  Outcome: Progressing  6/29/2024 2143 by Brandi Christian, FRANCA  Outcome: Progressing  Flowsheets (Taken 6/29/2024 1125 by Laura Vera RCP)  Achieves optimal ventilation and oxygenation:   Assess for changes in respiratory status   Assess for changes in mentation and behavior   Position to facilitate oxygenation and minimize respiratory effort   Oxygen supplementation based on oxygen saturation or arterial blood gases   Initiate smoking cessation protocol as indicated   Encourage broncho-pulmonary hygiene including cough, deep breathe, incentive spirometry   Assess the need for suctioning and aspirate as needed   Assess and instruct to report shortness of breath or any respiratory difficulty   Respiratory therapy support as indicated     Problem: Safety - Adult  Goal: Free from fall injury  Outcome: Progressing     Problem: Pain  Goal: Verbalizes/displays adequate comfort level or baseline comfort level  Outcome: Progressing     Problem: Chronic Conditions and Co-morbidities  Goal: Patient's chronic conditions and co-morbidity symptoms are monitored and maintained or improved  Outcome: Progressing     Problem: Nutrition Deficit:  Goal: Optimize nutritional status  Outcome: Progressing     Problem: Skin/Tissue Integrity  Goal: Absence of new skin breakdown  Description: 1.  Monitor for areas of redness and/or skin breakdown  2.  Assess vascular access sites hourly  3.  Every 4-6 hours minimum:  Change oxygen saturation probe site  4.  Every 4-6 hours:  If on nasal continuous positive airway pressure, respiratory therapy assess nares and determine need for appliance change or resting period.  Outcome: Progressing     Problem: ABCDS Injury Assessment  Goal: Absence of 
  Problem: Discharge Planning  Goal: Discharge to home or other facility with appropriate resources  Outcome: Progressing     Problem: Respiratory - Adult  Goal: Achieves optimal ventilation and oxygenation  Outcome: Progressing     Problem: Neurosensory - Adult  Goal: Achieves stable or improved neurological status  Outcome: Progressing  Goal: Absence of seizures  Outcome: Progressing  Goal: Achieves maximal functionality and self care  Outcome: Progressing     
  Problem: Discharge Planning  Goal: Discharge to home or other facility with appropriate resources  Outcome: Progressing     Problem: Respiratory - Adult  Goal: Achieves optimal ventilation and oxygenation  Outcome: Progressing     Problem: Safety - Adult  Goal: Free from fall injury  Outcome: Progressing     Problem: Pain  Goal: Verbalizes/displays adequate comfort level or baseline comfort level  Outcome: Progressing     Problem: Chronic Conditions and Co-morbidities  Goal: Patient's chronic conditions and co-morbidity symptoms are monitored and maintained or improved  Outcome: Progressing     Problem: Nutrition Deficit:  Goal: Optimize nutritional status  Outcome: Progressing     Problem: Skin/Tissue Integrity  Goal: Absence of new skin breakdown  Description: 1.  Monitor for areas of redness and/or skin breakdown  2.  Assess vascular access sites hourly  3.  Every 4-6 hours minimum:  Change oxygen saturation probe site  4.  Every 4-6 hours:  If on nasal continuous positive airway pressure, respiratory therapy assess nares and determine need for appliance change or resting period.  Outcome: Progressing     Problem: ABCDS Injury Assessment  Goal: Absence of physical injury  Outcome: Progressing     Problem: Safety - Medical Restraint  Goal: Remains free of injury from restraints (Restraint for Interference with Medical Device)  Description: INTERVENTIONS:  1. Determine that other, less restrictive measures have been tried or would not be effective before applying the restraint  2. Evaluate the patient's condition at the time of restraint application  3. Inform patient/family regarding the reason for restraint  4. Q2H: Monitor safety, psychosocial status, comfort, nutrition and hydration  6/28/2024 0450 by Sanna Hernandez RN  Outcome: Progressing  Flowsheets  Taken 6/28/2024 0400  Remains free of injury from restraints (restraint for interference with medical device):   Determine that other, less restrictive 
  Problem: Discharge Planning  Goal: Discharge to home or other facility with appropriate resources  Outcome: Progressing     Problem: Respiratory - Adult  Goal: Achieves optimal ventilation and oxygenation  Outcome: Progressing     Problem: Safety - Adult  Goal: Free from fall injury  Outcome: Progressing     Problem: Pain  Goal: Verbalizes/displays adequate comfort level or baseline comfort level  Outcome: Progressing     Problem: Chronic Conditions and Co-morbidities  Goal: Patient's chronic conditions and co-morbidity symptoms are monitored and maintained or improved  Outcome: Progressing     Problem: Nutrition Deficit:  Goal: Optimize nutritional status  Outcome: Progressing     Problem: Skin/Tissue Integrity  Goal: Absence of new skin breakdown  Description: 1.  Monitor for areas of redness and/or skin breakdown  2.  Assess vascular access sites hourly  3.  Every 4-6 hours minimum:  Change oxygen saturation probe site  4.  Every 4-6 hours:  If on nasal continuous positive airway pressure, respiratory therapy assess nares and determine need for appliance change or resting period.  Outcome: Progressing     Problem: ABCDS Injury Assessment  Goal: Absence of physical injury  Outcome: Progressing     Problem: Safety - Medical Restraint  Goal: Remains free of injury from restraints (Restraint for Interference with Medical Device)  Description: INTERVENTIONS:  1. Determine that other, less restrictive measures have been tried or would not be effective before applying the restraint  2. Evaluate the patient's condition at the time of restraint application  3. Inform patient/family regarding the reason for restraint  4. Q2H: Monitor safety, psychosocial status, comfort, nutrition and hydration  Outcome: Progressing  Flowsheets (Taken 7/2/2024 0600 by Autumn Degroot RN)  Remains free of injury from restraints (restraint for interference with medical device):   Determine that other, less restrictive measures have been 
  Problem: Discharge Planning  Goal: Discharge to home or other facility with appropriate resources  Outcome: Progressing     Problem: Respiratory - Adult  Goal: Achieves optimal ventilation and oxygenation  Outcome: Progressing     Problem: Safety - Medical Restraint  Goal: Remains free of injury from restraints (Restraint for Interference with Medical Device)  Description: INTERVENTIONS:  1. Determine that other, less restrictive measures have been tried or would not be effective before applying the restraint  2. Evaluate the patient's condition at the time of restraint application  3. Inform patient/family regarding the reason for restraint  4. Q2H: Monitor safety, psychosocial status, comfort, nutrition and hydration  Outcome: Progressing  Flowsheets  Taken 7/3/2024 1800  Remains free of injury from restraints (restraint for interference with medical device):   Determine that other, less restrictive measures have been tried or would not be effective before applying the restraint   Evaluate the patient's condition at the time of restraint application   Inform patient/family regarding the reason for restraint   Every 2 hours: Monitor safety, psychosocial status, comfort, nutrition and hydration  Taken 7/3/2024 1600  Remains free of injury from restraints (restraint for interference with medical device):   Determine that other, less restrictive measures have been tried or would not be effective before applying the restraint   Evaluate the patient's condition at the time of restraint application   Inform patient/family regarding the reason for restraint   Every 2 hours: Monitor safety, psychosocial status, comfort, nutrition and hydration  Taken 7/3/2024 1400  Remains free of injury from restraints (restraint for interference with medical device):   Evaluate the patient's condition at the time of restraint application   Inform patient/family regarding the reason for restraint   Every 2 hours: Monitor safety, 
  Problem: Discharge Planning  Goal: Discharge to home or other facility with appropriate resources  Outcome: Progressing  Flowsheets (Taken 7/6/2024 2000)  Discharge to home or other facility with appropriate resources: Identify barriers to discharge with patient and caregiver     Problem: Respiratory - Adult  Goal: Achieves optimal ventilation and oxygenation  Outcome: Progressing  Flowsheets (Taken 7/6/2024 2000)  Achieves optimal ventilation and oxygenation: Assess for changes in respiratory status     Problem: Neurosensory - Adult  Goal: Achieves stable or improved neurological status  Outcome: Progressing  Flowsheets (Taken 7/6/2024 2000)  Achieves stable or improved neurological status: Assess for and report changes in neurological status  Goal: Absence of seizures  Outcome: Progressing  Flowsheets (Taken 7/6/2024 2000)  Absence of seizures: Monitor for seizure activity.  If seizure occurs, document type and location of movements and any associated apnea  Goal: Achieves maximal functionality and self care  Outcome: Progressing  Flowsheets (Taken 7/6/2024 2000)  Achieves maximal functionality and self care: Monitor swallowing and airway patency with patient fatigue and changes in neurological status     
  Problem: Respiratory - Adult  Goal: Achieves optimal ventilation and oxygenation  6/23/2024 0302 by Dhara Beasley RCP  Outcome: Progressing  Flowsheets (Taken 6/23/2024 0302)  Achieves optimal ventilation and oxygenation:   Assess for changes in respiratory status   Assess the need for suctioning and aspirate as needed   Respiratory therapy support as indicated   Oxygen supplementation based on oxygen saturation or arterial blood gases  6/22/2024 1713 by Anabel Chau RN  Outcome: Progressing     
  Problem: Respiratory - Adult  Goal: Achieves optimal ventilation and oxygenation  6/24/2024 0847 by Freda Garcia RCP  Outcome: Progressing     
  Problem: Respiratory - Adult  Goal: Achieves optimal ventilation and oxygenation  6/25/2024 0018 by Rosa Cheek, FRANCA  Outcome: Progressing    
  Problem: Respiratory - Adult  Goal: Achieves optimal ventilation and oxygenation  6/25/2024 0750 by Neris Banegas, P  Outcome: Progressing     Problem: Skin/Tissue Integrity  Goal: Absence of new skin breakdown  Description: 1.  Monitor for areas of redness and/or skin breakdown  2.  Assess vascular access sites hourly  3.  Every 4-6 hours minimum:  Change oxygen saturation probe site  4.  Every 4-6 hours:  If on nasal continuous positive airway pressure, respiratory therapy assess nares and determine need for appliance change or resting period.  Outcome: Progressing     
  Problem: Respiratory - Adult  Goal: Achieves optimal ventilation and oxygenation  6/25/2024 1948 by Neris Banegas, FRANCA  Outcome: Progressing     Problem: Skin/Tissue Integrity  Goal: Absence of new skin breakdown  Description: 1.  Monitor for areas of redness and/or skin breakdown  2.  Assess vascular access sites hourly  3.  Every 4-6 hours minimum:  Change oxygen saturation probe site  4.  Every 4-6 hours:  If on nasal continuous positive airway pressure, respiratory therapy assess nares and determine need for appliance change or resting period.  6/25/2024 1948 by Neris Banegas, FRANCA  Outcome: Progressing     
  Problem: Respiratory - Adult  Goal: Achieves optimal ventilation and oxygenation  6/30/2024 0934 by Amy Angelo, FRANCA  Outcome: Progressing  Flowsheets (Taken 6/30/2024 0739)  Achieves optimal ventilation and oxygenation:   Assess for changes in respiratory status   Assess for changes in mentation and behavior   Position to facilitate oxygenation and minimize respiratory effort   Oxygen supplementation based on oxygen saturation or arterial blood gases   Assess the need for suctioning and aspirate as needed   Encourage broncho-pulmonary hygiene including cough, deep breathe, incentive spirometry   Assess and instruct to report shortness of breath or any respiratory difficulty   Respiratory therapy support as indicated     
  Problem: Respiratory - Adult  Goal: Achieves optimal ventilation and oxygenation  6/30/2024 1953 by Deena Weber, FRANCA  Flowsheets (Taken 6/30/2024 1953)  Achieves optimal ventilation and oxygenation:   Respiratory therapy support as indicated   Assess the need for suctioning and aspirate as needed   Assess for changes in respiratory status   Assess for changes in mentation and behavior   Oxygen supplementation based on oxygen saturation or arterial blood gases     
  Problem: Respiratory - Adult  Goal: Achieves optimal ventilation and oxygenation  7/1/2024 0748 by Zamzam Natarajan RCP  Outcome: Progressing     Problem: OXYGENATION/RESPIRATORY FUNCTION  Goal: Patient will maintain patent airway  Outcome: Ongoing  Goal: Patient will achieve/maintain normal respiratory rate/effort  Respiratory rate and effort will be within normal limits for the patient  Outcome: Ongoing    Problem: MECHANICAL VENTILATION  Goal: Patient will maintain patent airway  Outcome: Ongoing  Goal: Oral health is maintained or improved  Outcome: Ongoing  Goal: ET tube will be managed safely  Outcome: Ongoing  Goal: Ability to express needs and understand communication  Outcome: Ongoing  Goal: Mobility/activity is maintained at optimum level for patient  Outcome: Ongoing    Problem: ASPIRATION PRECAUTIONS  Goal: Patient’s risk of aspiration is minimized  Outcome: Ongoing    Problem: SKIN INTEGRITY  Goal: Skin integrity is maintained or improved  Outcome: Ongoing                    
  Problem: Respiratory - Adult  Goal: Achieves optimal ventilation and oxygenation  7/10/2024 0511 by Rajni Ivy RN  Outcome: Progressing  7/10/2024 0510 by Rajni Ivy RN  Outcome: Progressing  7/10/2024 0510 by Rajni Ivy RN  Outcome: Progressing  7/9/2024 1705 by Yudith Dent RN  Outcome: Progressing  Flowsheets (Taken 7/9/2024 0800)  Achieves optimal ventilation and oxygenation:   Assess for changes in respiratory status   Assess for changes in mentation and behavior   Position to facilitate oxygenation and minimize respiratory effort   Oxygen supplementation based on oxygen saturation or arterial blood gases   Encourage broncho-pulmonary hygiene including cough, deep breathe, incentive spirometry   Assess the need for suctioning and aspirate as needed   Assess and instruct to report shortness of breath or any respiratory difficulty   Respiratory therapy support as indicated     
  Problem: Respiratory - Adult  Goal: Achieves optimal ventilation and oxygenation  7/6/2024 1008 by Eusebia Fontana RN  Outcome: Progressing     Problem: Neurosensory - Adult  Goal: Achieves stable or improved neurological status  7/6/2024 1008 by Eusebia Fontana RN  Outcome: Progressing     Problem: Skin/Tissue Integrity - Adult  Goal: Skin integrity remains intact  7/6/2024 1008 by Eusebia Fontana RN  Outcome: Progressing     Problem: Musculoskeletal - Adult  Goal: Return mobility to safest level of function  7/6/2024 1008 by Eusebia Fontana RN  Outcome: Progressing     
  Problem: Respiratory - Adult  Goal: Achieves optimal ventilation and oxygenation  7/7/2024 0713 by Eusebia Fontana RN  Outcome: Progressing     Problem: Cardiovascular - Adult  Goal: Maintains optimal cardiac output and hemodynamic stability  7/7/2024 0713 by Eusebia Fontana RN  Outcome: Progressing     Problem: Skin/Tissue Integrity - Adult  Goal: Skin integrity remains intact  7/7/2024 0713 by Eusebia Fontana RN  Outcome: Progressing     Problem: Musculoskeletal - Adult  Goal: Return mobility to safest level of function  7/7/2024 0713 by Eusebia Fontana RN  Outcome: Progressing     
  Problem: Respiratory - Adult  Goal: Achieves optimal ventilation and oxygenation  7/8/2024 0858 by Eusebia Fontana RN  Outcome: Progressing     Problem: Neurosensory - Adult  Goal: Achieves stable or improved neurological status  7/8/2024 0858 by Eusebia Fontana RN  Outcome: Progressing     Problem: Skin/Tissue Integrity - Adult  Goal: Skin integrity remains intact  7/8/2024 0858 by Eusebia Fontana RN  Outcome: Progressing     
  Problem: Respiratory - Adult  Goal: Achieves optimal ventilation and oxygenation  Outcome: Progressing    
  Problem: Respiratory - Adult  Goal: Achieves optimal ventilation and oxygenation  Outcome: Progressing  Flowsheets  Taken 6/27/2024 0409 by Dhara Beasley RCP  Achieves optimal ventilation and oxygenation:   Assess for changes in respiratory status   Assess the need for suctioning and aspirate as needed   Respiratory therapy support as indicated   Oxygen supplementation based on oxygen saturation or arterial blood gases  Taken 6/26/2024 2000 by Macie Dasilva RN  Achieves optimal ventilation and oxygenation:   Assess for changes in respiratory status   Position to facilitate oxygenation and minimize respiratory effort   Oxygen supplementation based on oxygen saturation or arterial blood gases   Assess the need for suctioning and aspirate as needed   Respiratory therapy support as indicated     
  Problem: Respiratory - Adult  Goal: Achieves optimal ventilation and oxygenation  Outcome: Progressing  Flowsheets (Taken 6/21/2024 3591)  Achieves optimal ventilation and oxygenation:   Assess for changes in respiratory status   Assess the need for suctioning and aspirate as needed   Respiratory therapy support as indicated   Oxygen supplementation based on oxygen saturation or arterial blood gases     
  Problem: Safety - Adult  Goal: Free from fall injury  Outcome: Progressing     Problem: Pain  Goal: Verbalizes/displays adequate comfort level or baseline comfort level  Outcome: Progressing     
  Problem: Safety - Medical Restraint  Goal: Remains free of injury from restraints (Restraint for Interference with Medical Device)  Description: INTERVENTIONS:  1. Determine that other, less restrictive measures have been tried or would not be effective before applying the restraint  2. Evaluate the patient's condition at the time of restraint application  3. Inform patient/family regarding the reason for restraint  4. Q2H: Monitor safety, psychosocial status, comfort, nutrition and hydration  6/22/2024 1713 by Anabel Chau RN  Outcome: Progressing  Flowsheets  Taken 6/22/2024 1600 by Anabel Chau RN  Remains free of injury from restraints (restraint for interference with medical device): Every 2 hours: Monitor safety, psychosocial status, comfort, nutrition and hydration  Taken 6/22/2024 1400 by Anabel Chau RN  Remains free of injury from restraints (restraint for interference with medical device): Every 2 hours: Monitor safety, psychosocial status, comfort, nutrition and hydration  Taken 6/22/2024 1200 by Anabel Chau RN  Remains free of injury from restraints (restraint for interference with medical device): Every 2 hours: Monitor safety, psychosocial status, comfort, nutrition and hydration  Taken 6/22/2024 1000 by Anabel Chau RN  Remains free of injury from restraints (restraint for interference with medical device): Every 2 hours: Monitor safety, psychosocial status, comfort, nutrition and hydration  Taken 6/22/2024 0800 by Anabel Chau RN  Remains free of injury from restraints (restraint for interference with medical device): Every 2 hours: Monitor safety, psychosocial status, comfort, nutrition and hydration  Taken 6/22/2024 0600 by Autumn Degroot RN  Remains free of injury from restraints (restraint for interference with medical device): Every 2 hours: Monitor safety, psychosocial status, comfort, nutrition and hydration     Problem: Discharge Planning  Goal: Discharge to home or other facility with 
  Problem: Safety - Medical Restraint  Goal: Remains free of injury from restraints (Restraint for Interference with Medical Device)  Description: INTERVENTIONS:  1. Determine that other, less restrictive measures have been tried or would not be effective before applying the restraint  2. Evaluate the patient's condition at the time of restraint application  3. Inform patient/family regarding the reason for restraint  4. Q2H: Monitor safety, psychosocial status, comfort, nutrition and hydration  6/23/2024 1725 by Anabel Chau RN  Outcome: Progressing  Flowsheets  Taken 6/23/2024 1600  Remains free of injury from restraints (restraint for interference with medical device): Every 2 hours: Monitor safety, psychosocial status, comfort, nutrition and hydration  Taken 6/23/2024 1400  Remains free of injury from restraints (restraint for interference with medical device): Every 2 hours: Monitor safety, psychosocial status, comfort, nutrition and hydration  Taken 6/23/2024 1200  Remains free of injury from restraints (restraint for interference with medical device): Every 2 hours: Monitor safety, psychosocial status, comfort, nutrition and hydration  Taken 6/23/2024 1000  Remains free of injury from restraints (restraint for interference with medical device): Every 2 hours: Monitor safety, psychosocial status, comfort, nutrition and hydration  Taken 6/23/2024 0800  Remains free of injury from restraints (restraint for interference with medical device): Every 2 hours: Monitor safety, psychosocial status, comfort, nutrition and hydration     Problem: Discharge Planning  Goal: Discharge to home or other facility with appropriate resources  6/23/2024 1725 by Anabel Chau RN  Outcome: Progressing     Problem: Respiratory - Adult  Goal: Achieves optimal ventilation and oxygenation  6/23/2024 1725 by Anabel Chau, RN  Outcome: Progressing     
  Problem: Safety - Medical Restraint  Goal: Remains free of injury from restraints (Restraint for Interference with Medical Device)  Description: INTERVENTIONS:  1. Determine that other, less restrictive measures have been tried or would not be effective before applying the restraint  2. Evaluate the patient's condition at the time of restraint application  3. Inform patient/family regarding the reason for restraint  4. Q2H: Monitor safety, psychosocial status, comfort, nutrition and hydration  6/24/2024 0303 by Autumn Degroot RN  Outcome: Progressing  Flowsheets  Taken 6/24/2024 0200 by Autumn Degroot RN  Remains free of injury from restraints (restraint for interference with medical device): Every 2 hours: Monitor safety, psychosocial status, comfort, nutrition and hydration  Taken 6/24/2024 0000 by Autumn Degroot RN  Remains free of injury from restraints (restraint for interference with medical device): Every 2 hours: Monitor safety, psychosocial status, comfort, nutrition and hydration  Taken 6/23/2024 2314 by Autumn Degroot RN  Remains free of injury from restraints (restraint for interference with medical device): Every 2 hours: Monitor safety, psychosocial status, comfort, nutrition and hydration  Taken 6/23/2024 2200 by Autumn Degroot RN  Remains free of injury from restraints (restraint for interference with medical device): Every 2 hours: Monitor safety, psychosocial status, comfort, nutrition and hydration  Taken 6/23/2024 2000 by Autumn Degroot RN  Remains free of injury from restraints (restraint for interference with medical device): Every 2 hours: Monitor safety, psychosocial status, comfort, nutrition and hydration  Taken 6/23/2024 1800 by Anabel Chau RN  Remains free of injury from restraints (restraint for interference with medical device): Every 2 hours: Monitor safety, psychosocial status, comfort, nutrition and hydration  6/23/2024 1725 by Anabel Chau RN  Outcome: 
  Problem: Safety - Medical Restraint  Goal: Remains free of injury from restraints (Restraint for Interference with Medical Device)  Description: INTERVENTIONS:  1. Determine that other, less restrictive measures have been tried or would not be effective before applying the restraint  2. Evaluate the patient's condition at the time of restraint application  3. Inform patient/family regarding the reason for restraint  4. Q2H: Monitor safety, psychosocial status, comfort, nutrition and hydration  6/25/2024 0257 by Nicholas Darby RN  Outcome: Progressing  Flowsheets  Taken 6/24/2024 2000 by Nicholas Darby RN  Remains free of injury from restraints (restraint for interference with medical device): Every 2 hours: Monitor safety, psychosocial status, comfort, nutrition and hydration  Taken 6/24/2024 1800 by Anabel Chau RN  Remains free of injury from restraints (restraint for interference with medical device): Every 2 hours: Monitor safety, psychosocial status, comfort, nutrition and hydration  6/24/2024 1716 by Anabel Chau RN  Outcome: Progressing  Flowsheets  Taken 6/24/2024 1600 by Anabel Chau RN  Remains free of injury from restraints (restraint for interference with medical device): Every 2 hours: Monitor safety, psychosocial status, comfort, nutrition and hydration  Taken 6/24/2024 1400 by Anabel Chau RN  Remains free of injury from restraints (restraint for interference with medical device): Every 2 hours: Monitor safety, psychosocial status, comfort, nutrition and hydration  Taken 6/24/2024 1200 by Anabel Chau RN  Remains free of injury from restraints (restraint for interference with medical device): Every 2 hours: Monitor safety, psychosocial status, comfort, nutrition and hydration  Taken 6/24/2024 1000 by Anabel Chau RN  Remains free of injury from restraints (restraint for interference with medical device): Every 2 hours: Monitor safety, psychosocial status, comfort, nutrition and hydration  Taken 
  Problem: Safety - Medical Restraint  Goal: Remains free of injury from restraints (Restraint for Interference with Medical Device)  Description: INTERVENTIONS:  1. Determine that other, less restrictive measures have been tried or would not be effective before applying the restraint  2. Evaluate the patient's condition at the time of restraint application  3. Inform patient/family regarding the reason for restraint  4. Q2H: Monitor safety, psychosocial status, comfort, nutrition and hydration  6/26/2024 0113 by Nicholas Darby, RN  Outcome: Progressing  6/25/2024 2009 by Autumn Holder, RN  Outcome: Progressing     
  Problem: Safety - Medical Restraint  Goal: Remains free of injury from restraints (Restraint for Interference with Medical Device)  Description: INTERVENTIONS:  1. Determine that other, less restrictive measures have been tried or would not be effective before applying the restraint  2. Evaluate the patient's condition at the time of restraint application  3. Inform patient/family regarding the reason for restraint  4. Q2H: Monitor safety, psychosocial status, comfort, nutrition and hydration  6/26/2024 1401 by Josie Agarwal, RN  Outcome: Completed     
  Problem: Safety - Medical Restraint  Goal: Remains free of injury from restraints (Restraint for Interference with Medical Device)  Description: INTERVENTIONS:  1. Determine that other, less restrictive measures have been tried or would not be effective before applying the restraint  2. Evaluate the patient's condition at the time of restraint application  3. Inform patient/family regarding the reason for restraint  4. Q2H: Monitor safety, psychosocial status, comfort, nutrition and hydration  6/29/2024 1617 by Zainab Albarado RN  Outcome: Not Progressing  Flowsheets  Taken 6/29/2024 1600 by Zainab Albarado RN  Remains free of injury from restraints (restraint for interference with medical device): Every 2 hours: Monitor safety, psychosocial status, comfort, nutrition and hydration  Taken 6/29/2024 1200 by Zainab Albarado RN  Remains free of injury from restraints (restraint for interference with medical device): Every 2 hours: Monitor safety, psychosocial status, comfort, nutrition and hydration  Taken 6/29/2024 0800 by Zainab Albarado RN  Remains free of injury from restraints (restraint for interference with medical device): Every 2 hours: Monitor safety, psychosocial status, comfort, nutrition and hydration  Taken 6/29/2024 0600 by Autumn Degroot RN  Remains free of injury from restraints (restraint for interference with medical device): Every 2 hours: Monitor safety, psychosocial status, comfort, nutrition and hydration  6/29/2024 0429 by Autumn Degroot RN  Outcome: Progressing  Flowsheets  Taken 6/29/2024 0400  Remains free of injury from restraints (restraint for interference with medical device): Every 2 hours: Monitor safety, psychosocial status, comfort, nutrition and hydration  Taken 6/29/2024 0200  Remains free of injury from restraints (restraint for interference with medical device): Every 2 hours: Monitor safety, psychosocial status, comfort, nutrition and hydration  Taken 6/29/2024 
  Problem: Safety - Medical Restraint  Goal: Remains free of injury from restraints (Restraint for Interference with Medical Device)  Description: INTERVENTIONS:  1. Determine that other, less restrictive measures have been tried or would not be effective before applying the restraint  2. Evaluate the patient's condition at the time of restraint application  3. Inform patient/family regarding the reason for restraint  4. Q2H: Monitor safety, psychosocial status, comfort, nutrition and hydration  Outcome: Progressing     Problem: Skin/Tissue Integrity  Goal: Absence of new skin breakdown  Description: 1.  Monitor for areas of redness and/or skin breakdown  2.  Assess vascular access sites hourly  3.  Every 4-6 hours minimum:  Change oxygen saturation probe site  4.  Every 4-6 hours:  If on nasal continuous positive airway pressure, respiratory therapy assess nares and determine need for appliance change or resting period.  6/25/2024 1948 by Neris Banegas, FRANCA  Outcome: Progressing  6/25/2024 0750 by Neris Banegas, FRANCA  Outcome: Progressing     
  Problem: Safety - Medical Restraint  Goal: Remains free of injury from restraints (Restraint for Interference with Medical Device)  Description: INTERVENTIONS:  1. Determine that other, less restrictive measures have been tried or would not be effective before applying the restraint  2. Evaluate the patient's condition at the time of restraint application  3. Inform patient/family regarding the reason for restraint  4. Q2H: Monitor safety, psychosocial status, comfort, nutrition and hydration  Outcome: Progressing  Flowsheets  Taken 6/22/2024 0200 by Autumn Degroot RN  Remains free of injury from restraints (restraint for interference with medical device): Every 2 hours: Monitor safety, psychosocial status, comfort, nutrition and hydration  Taken 6/22/2024 0000 by Autumn Degroot RN  Remains free of injury from restraints (restraint for interference with medical device): Every 2 hours: Monitor safety, psychosocial status, comfort, nutrition and hydration  Taken 6/21/2024 2349 by Autumn Degroot RN  Remains free of injury from restraints (restraint for interference with medical device): Every 2 hours: Monitor safety, psychosocial status, comfort, nutrition and hydration  Taken 6/21/2024 2200 by Autumn Degroot RN  Remains free of injury from restraints (restraint for interference with medical device): Every 2 hours: Monitor safety, psychosocial status, comfort, nutrition and hydration  Taken 6/21/2024 2000 by Autumn Degroot RN  Remains free of injury from restraints (restraint for interference with medical device): Every 2 hours: Monitor safety, psychosocial status, comfort, nutrition and hydration  Taken 6/21/2024 1800 by Mc Guardado RN  Remains free of injury from restraints (restraint for interference with medical device): Every 2 hours: Monitor safety, psychosocial status, comfort, nutrition and hydration  Taken 6/21/2024 1600 by Mc Guardado RN  Remains free of injury from restraints (restraint for 
  Problem: Safety - Medical Restraint  Goal: Remains free of injury from restraints (Restraint for Interference with Medical Device)  Description: INTERVENTIONS:  1. Determine that other, less restrictive measures have been tried or would not be effective before applying the restraint  2. Evaluate the patient's condition at the time of restraint application  3. Inform patient/family regarding the reason for restraint  4. Q2H: Monitor safety, psychosocial status, comfort, nutrition and hydration  Outcome: Progressing  Flowsheets  Taken 6/24/2024 1600 by Anabel Chau RN  Remains free of injury from restraints (restraint for interference with medical device): Every 2 hours: Monitor safety, psychosocial status, comfort, nutrition and hydration  Taken 6/24/2024 1400 by Anabel Chau RN  Remains free of injury from restraints (restraint for interference with medical device): Every 2 hours: Monitor safety, psychosocial status, comfort, nutrition and hydration  Taken 6/24/2024 1200 by Anabel Chau RN  Remains free of injury from restraints (restraint for interference with medical device): Every 2 hours: Monitor safety, psychosocial status, comfort, nutrition and hydration  Taken 6/24/2024 1000 by Anabel Chau RN  Remains free of injury from restraints (restraint for interference with medical device): Every 2 hours: Monitor safety, psychosocial status, comfort, nutrition and hydration  Taken 6/24/2024 0800 by Anabel Chau RN  Remains free of injury from restraints (restraint for interference with medical device): Every 2 hours: Monitor safety, psychosocial status, comfort, nutrition and hydration  Taken 6/24/2024 0600 by Autumn Degroot RN  Remains free of injury from restraints (restraint for interference with medical device): Every 2 hours: Monitor safety, psychosocial status, comfort, nutrition and hydration  Taken 6/24/2024 0400 by Autumn Degroot RN  Remains free of injury from restraints (restraint for interference with 
  Problem: Safety - Medical Restraint  Goal: Remains free of injury from restraints (Restraint for Interference with Medical Device)  Description: INTERVENTIONS:  1. Determine that other, less restrictive measures have been tried or would not be effective before applying the restraint  2. Evaluate the patient's condition at the time of restraint application  3. Inform patient/family regarding the reason for restraint  4. Q2H: Monitor safety, psychosocial status, comfort, nutrition and hydration  Recent Flowsheet Documentation  Taken 6/28/2024 1826 by Autumn Holder RN  Remains free of injury from restraints (restraint for interference with medical device):   Determine that other, less restrictive measures have been tried or would not be effective before applying the restraint   Evaluate the patient's condition at the time of restraint application   Inform patient/family regarding the reason for restraint   Every 2 hours: Monitor safety, psychosocial status, comfort, nutrition and hydration  Taken 6/28/2024 0600 by Sanna Hernandez RN  Remains free of injury from restraints (restraint for interference with medical device):   Evaluate the patient's condition at the time of restraint application   Determine that other, less restrictive measures have been tried or would not be effective before applying the restraint   Inform patient/family regarding the reason for restraint   Every 2 hours: Monitor safety, psychosocial status, comfort, nutrition and hydration     Problem: Safety - Medical Restraint  Goal: Remains free of injury from restraints (Restraint for Interference with Medical Device)  Description: INTERVENTIONS:  1. Determine that other, less restrictive measures have been tried or would not be effective before applying the restraint  2. Evaluate the patient's condition at the time of restraint application  3. Inform patient/family regarding the reason for restraint  4. Q2H: Monitor safety, psychosocial 
Brief Note     Spoke with brother Ray Arellano via telephone. Stated that his brother is usually independent with his activity of daily living and lives alone is  and has a step daughter. He reports that he has other siblings but that he and his brother Lamine are the closest in proximity. Brother states that he is willing to make medical decisions for patient understanding that patient is unable to do so at this time. He states he doesn't believe that patient has a living will but will discuss with brother Lamine to see if he has any further information on that topic. He also states that it is ok to give information to patients neighbor Darcie and his  Father Nishant.     Myranda FLORES-CNP    
Problem: OXYGENATION/RESPIRATORY FUNCTION  Goal: Patient will maintain patent airway  Outcome: Ongoing  Goal: Patient will achieve/maintain normal respiratory rate/effort  Respiratory rate and effort will be within normal limits for the patient  Outcome: Ongoing    Problem: MECHANICAL VENTILATION  Goal: Patient will maintain patent airway  Outcome: Ongoing  Goal: Oral health is maintained or improved  Outcome: Ongoing  Goal: ET tube will be managed safely  Outcome: Ongoing  Goal: Ability to express needs and understand communication  Outcome: Ongoing  Goal: Mobility/activity is maintained at optimum level for patient  Outcome: Ongoing    Problem: ASPIRATION PRECAUTIONS  Goal: Patient’s risk of aspiration is minimized  Outcome: Ongoing    Problem: SKIN INTEGRITY  Goal: Skin integrity is maintained or improved  Outcome: Ongoing                    
Progressing  7/3/2024 1818 by Cecy Higuera, RN  Outcome: Progressing  Flowsheets  Taken 7/3/2024 1800  Remains free of injury from restraints (restraint for interference with medical device):   Determine that other, less restrictive measures have been tried or would not be effective before applying the restraint   Evaluate the patient's condition at the time of restraint application   Inform patient/family regarding the reason for restraint   Every 2 hours: Monitor safety, psychosocial status, comfort, nutrition and hydration  Taken 7/3/2024 1600  Remains free of injury from restraints (restraint for interference with medical device):   Determine that other, less restrictive measures have been tried or would not be effective before applying the restraint   Evaluate the patient's condition at the time of restraint application   Inform patient/family regarding the reason for restraint   Every 2 hours: Monitor safety, psychosocial status, comfort, nutrition and hydration  Taken 7/3/2024 1400  Remains free of injury from restraints (restraint for interference with medical device):   Evaluate the patient's condition at the time of restraint application   Inform patient/family regarding the reason for restraint   Every 2 hours: Monitor safety, psychosocial status, comfort, nutrition and hydration  Taken 7/3/2024 1200  Remains free of injury from restraints (restraint for interference with medical device):   Determine that other, less restrictive measures have been tried or would not be effective before applying the restraint   Evaluate the patient's condition at the time of restraint application   Inform patient/family regarding the reason for restraint   Every 2 hours: Monitor safety, psychosocial status, comfort, nutrition and hydration  Taken 7/3/2024 1000  Remains free of injury from restraints (restraint for interference with medical device):   Evaluate the patient's condition at the time of restraint application   
nutrition and hydration  Taken 6/30/2024 0200 by Autumn Degroot RN  Remains free of injury from restraints (restraint for interference with medical device): Every 2 hours: Monitor safety, psychosocial status, comfort, nutrition and hydration  Taken 6/30/2024 0000 by Autumn Degroot RN  Remains free of injury from restraints (restraint for interference with medical device): Every 2 hours: Monitor safety, psychosocial status, comfort, nutrition and hydration  Taken 6/29/2024 2200 by Autumn Degroot RN  Remains free of injury from restraints (restraint for interference with medical device): Every 2 hours: Monitor safety, psychosocial status, comfort, nutrition and hydration  Taken 6/29/2024 2000 by Autumn Degroot RN  Remains free of injury from restraints (restraint for interference with medical device): Every 2 hours: Monitor safety, psychosocial status, comfort, nutrition and hydration  Taken 6/29/2024 1800 by Zainab Albarado RN  Remains free of injury from restraints (restraint for interference with medical device): Every 2 hours: Monitor safety, psychosocial status, comfort, nutrition and hydration     
of injury from restraints (restraint for interference with medical device): Evaluate the patient's condition at the time of restraint application  Taken 6/20/2024 1455 by Anabel Mckinley RN  Remains free of injury from restraints (restraint for interference with medical device): Evaluate the patient's condition at the time of restraint application     Problem: Discharge Planning  Goal: Discharge to home or other facility with appropriate resources  Outcome: Progressing     Problem: Respiratory - Adult  Goal: Achieves optimal ventilation and oxygenation  Outcome: Progressing     Problem: Safety - Adult  Goal: Free from fall injury  Outcome: Progressing     Problem: Pain  Goal: Verbalizes/displays adequate comfort level or baseline comfort level  Outcome: Progressing  Flowsheets (Taken 6/21/2024 0400)  Verbalizes/displays adequate comfort level or baseline comfort level: Assess pain using appropriate pain scale     
Progressing     Problem: Safety - Adult  Goal: Free from fall injury  Outcome: Progressing     Problem: Pain  Goal: Verbalizes/displays adequate comfort level or baseline comfort level  Outcome: Progressing     Problem: Chronic Conditions and Co-morbidities  Goal: Patient's chronic conditions and co-morbidity symptoms are monitored and maintained or improved  Outcome: Progressing     Problem: Nutrition Deficit:  Goal: Optimize nutritional status  Outcome: Progressing     Problem: Skin/Tissue Integrity  Goal: Absence of new skin breakdown  Description: 1.  Monitor for areas of redness and/or skin breakdown  2.  Assess vascular access sites hourly  3.  Every 4-6 hours minimum:  Change oxygen saturation probe site  4.  Every 4-6 hours:  If on nasal continuous positive airway pressure, respiratory therapy assess nares and determine need for appliance change or resting period.  Outcome: Progressing     Problem: ABCDS Injury Assessment  Goal: Absence of physical injury  Outcome: Progressing  Flowsheets (Taken 6/22/2024 2000)  Absence of Physical Injury: Implement safety measures based on patient assessment     
and hydration  Note: Patient continues to reach for ett, restraints remain in place for patient safety.      
free of injury from restraints (restraint for interference with medical device): Determine that other, less restrictive measures have been tried or would not be effective before applying the restraint  Taken 7/1/2024 0800 by Mara Nelson RN  Remains free of injury from restraints (restraint for interference with medical device): Determine that other, less restrictive measures have been tried or would not be effective before applying the restraint  Taken 7/1/2024 0608 by Autumn Degroot RN  Remains free of injury from restraints (restraint for interference with medical device): Every 2 hours: Monitor safety, psychosocial status, comfort, nutrition and hydration  Taken 7/1/2024 0400 by Autumn Degroot RN  Remains free of injury from restraints (restraint for interference with medical device): Every 2 hours: Monitor safety, psychosocial status, comfort, nutrition and hydration     
nutrition and hydration  Note: Patient continues to reach for ETT restraint remain in place for patient safety. Will continue to monitor and reassess.      Problem: Discharge Planning  Goal: Discharge to home or other facility with appropriate resources  6/27/2024 0500 by Macie Dasilva, RN  Outcome: Not Progressing  Flowsheets (Taken 6/26/2024 2000)  Discharge to home or other facility with appropriate resources: Refer to discharge planning if patient needs post-hospital services based on physician order or complex needs related to functional status, cognitive ability or social support system     Problem: Respiratory - Adult  Goal: Achieves optimal ventilation and oxygenation  Recent Flowsheet Documentation  Taken 6/27/2024 0834 by Toña Ashton RCP  Achieves optimal ventilation and oxygenation:   Assess for changes in respiratory status   Assess for changes in mentation and behavior   Position to facilitate oxygenation and minimize respiratory effort   Oxygen supplementation based on oxygen saturation or arterial blood gases   Assess the need for suctioning and aspirate as needed   Assess and instruct to report shortness of breath or any respiratory difficulty   Respiratory therapy support as indicated  6/27/2024 0500 by Macie Dasilva, RN  Outcome: Progressing     Problem: Discharge Planning  Goal: Discharge to home or other facility with appropriate resources  6/27/2024 0500 by Macie Dasilva, RN  Outcome: Not Progressing  Flowsheets (Taken 6/26/2024 2000)  Discharge to home or other facility with appropriate resources: Refer to discharge planning if patient needs post-hospital services based on physician order or complex needs related to functional status, cognitive ability or social support system     
of urinary retention:   Assess patient’s ability to void and empty bladder   Monitor intake/output and perform bladder scan as needed   Place urinary catheter per Licensed Independent Practitioner order if needed   Discuss with Licensed Independent Practitioner  medications to alleviate retention as needed   Discuss catheterization for long term situations as appropriate  Goal: Urinary catheter remains patent  Outcome: Progressing  Flowsheets (Taken 7/9/2024 0800)  Urinary catheter remains patent:   Assess patency of urinary catheter   Irrigate catheter per Licensed Independent Practitioner order if indicated and notify Licensed Independent Practitioner if unable to irrigate   Assess need for a larger catheter size or a 3-way catheter for continuous bladder irrigation     Problem: Infection - Adult  Goal: Absence of infection at discharge  Outcome: Progressing  Flowsheets  Taken 7/9/2024 0800  Absence of infection at discharge:   Assess and monitor for signs and symptoms of infection   Monitor lab/diagnostic results   Monitor all insertion sites i.e., indwelling lines, tubes and drains   [place] appropriate cooling/warming therapies per order   Administer medications as ordered   Instruct and encourage patient and family to use good hand hygiene technique   Identify and instruct in appropriate isolation precautions for identified infection/condition  Taken 7/9/2024 0717  Absence of infection at discharge:   Assess and monitor for signs and symptoms of infection   Monitor lab/diagnostic results   Monitor all insertion sites i.e., indwelling lines, tubes and drains   Administer medications as ordered   Instruct and encourage patient and family to use good hand hygiene technique   Identify and instruct in appropriate isolation precautions for identified infection/condition     Problem: Metabolic/Fluid and Electrolytes - Adult  Goal: Electrolytes maintained within normal limits  Outcome: Progressing  Flowsheets (Taken 
2225)  Electrolytes maintained within normal limits: Monitor labs and assess patient for signs and symptoms of electrolyte imbalances  Goal: Glucose maintained within prescribed range  7/4/2024 1818 by Davina Apodaca RN  Outcome: Progressing  Flowsheets (Taken 7/3/2024 2225 by Klarissa Weaver, RN)  Glucose maintained within prescribed range: Monitor blood glucose as ordered  7/4/2024 0504 by Klarissa Weaver RN  Outcome: Progressing  Flowsheets (Taken 7/3/2024 2225)  Glucose maintained within prescribed range: Monitor blood glucose as ordered     Problem: Hematologic - Adult  Goal: Maintains hematologic stability  7/4/2024 1818 by Davina Apodaca RN  Outcome: Progressing  7/4/2024 0504 by Klarissa Weaver RN  Outcome: Progressing  Flowsheets (Taken 7/3/2024 2225)  Maintains hematologic stability: Assess for signs and symptoms of bleeding or hemorrhage

## 2024-07-10 NOTE — CARE COORDINATION
Transitional Planning  Called [facility], [phone], message left for [facility], requested call back.      1133 am Call back from [facility] at [facility], able to accept patient today, continues to meet LTACH criteria. Receiving facility requesting 2 hr notice prior to admission.  Spoke with neuro team plan for dc today.      200 pm [facility] neuro regarding dc.  Patient is medically stable for discharge.  Faxed [facility] with a request for transportation.      [facility], will call around    Call back from [place] at [facility], Haim Joyce to  at 7 pm.     PS Dr. Rick for [facility].      Message left for [facility] at [facility].      Update to Shira PARRA     Called brother Bell update provided.      Faxed [facility]UCK to [phone] report number [phone]    VMM received from [facility] at [facility] stating patient will need additional documentation to meet LTACH criteria.    Called and spoke with [facility], states patient does meet criteria under UR, but upper management is [facility]     PS Dr. Rick with information and will review with attending.      450 pm Confirmed with [facility] still able to accept tonight at 7 pm.  Confirmation received, able to accept.      Discharge Report    [facility]  Clinical Case Management Department  Written by: Leslye Peguero RN    Patient Name: Timothy Arellano  Attending Provider: Felton Chamberlain MD  Admit Date: 2024  1:50 PM  MRN: [id number]  Account: [id number]                     : 1950  Discharge Date: 7-10-24      Disposition: [facility]     Leslye Peguero RN

## 2024-07-10 NOTE — PROGRESS NOTES
Pharmacy Note     Enoxaparin Dose Adjustment    Timothy Arellano is a 73 y.o. male. Pharmacist assessment of enoxaparin dose for VTE prophylaxis.    Recent Labs     06/26/24  0524 06/27/24  0556   BUN 25* 26*       Recent Labs     06/26/24  0524 06/27/24  0556   CREATININE 0.8 0.8       Estimated Creatinine Clearance: 113 mL/min (based on SCr of 0.8 mg/dL).      Height:   Ht Readings from Last 1 Encounters:   06/25/24 1.727 m (5' 7.99\")     Weight:  Wt Readings from Last 1 Encounters:   06/24/24 135.6 kg (299 lb)       The following enoxaparin dose has been adjusted based upon renal function and/or patient weight per P&T Guidelines:             Enoxaparin 40 mg daily adjusted to 30 mg BID based on patient weight    
      Physical Therapy Cancel Note      DATE: 2024    NAME: Timothy Arellano  MRN: [id number]   : 1950      Patient not seen this date for Physical Therapy due to:    Surgery/Procedure: pt had PEG this AM; pt had stat CT, going to have LTME      Electronically signed by Luis Angel Baez PT on 2024 at 1:55 PM      
      Physical Therapy Cancel Note      DATE: 2024    NAME: Timothy Arellano  MRN: [id number]   : 1950      Patient not seen this date for Physical Therapy due to:    Other: pt had lumbar puncture late morning; per RN, not allowed to mobilize unitl 4:30; check back 7/10      Electronically signed by Luis Angel Baez, PT on 2024 at 1:27 PM      
    Nephrology Progress Note    Subjective:    Patient was a new consult yesterday for SINTIA, with baseline Cr 0.9- 1.0.     Patient seen and examined, discussed with bedside nursing.   Going for CT head without contrast soon.  Which confirms left MCA territory CVA without midline shift  Total fluids around 100cc/hr.   Tube feeds at goal 45cc/hr.   Has Juárez u/o 1155 past 24 hours.   He is currently off Brandon-Synephrine heparin gtt, remains sedated on vent FiO2 50%. No family present.   Weight is up by 17 kg, patient does appear edematous.  CPK now down to 6000.  Na 140 K 4.2 chloride 112, CO2 20 BUN 24 Cr 1.0, glucose 248, calcium 7.6  CK is down to 8,931    Renal US 24  IMPRESSION:  Unremarkable ultrasound of the kidneys and urinary bladder.              Specimen Collected: 24 05:39 EDT             In Summary:  This is a 73 y.o. male who has a long complicated past medical history.  He has a history of atrial fibrillation for which she is on Eliquis, he has a history of congestive cardiac failure, longstanding diabetes, hypertension as well as hyperlipidemia.  Patient also suffers from Parkinson disease.  Patient is a  and he lives all by himself.  As per nurses patient was not seen by his neighbors for 48 hours and his garbage can was also at the curb.  Which made his neighbor concerned and EMS was called.  Patient was brought into ER.  At ER patient was in atrial fibrillation with RVR, patient was not responding to physical stimuli and he was intubated to protect his airway.  Patient underwent imaging studies which shows subacute left MCA stroke, he also underwent CTA which shows moderate-sized acute left MCA infarct without any hemorrhage, CT of chest and abdomen which shows bilateral pleural effusion.  On further workup he found to have a creatinine of 1.6 as well as elevated CK.    Objective:  CURRENT TEMPERATURE:  Temp: 99.9 °F (37.7 °C)  MAXIMUM TEMPERATURE OVER 24HRS:  Temp (24hrs), Av.3 
   06/24/24 0422   Ventilator Settings   FiO2  (S)  30 %     Per abg   
   06/26/24 0818   Ventilator Settings   FiO2  (S)  30 %   PEEP/CPAP (cmH2O) (S)  5   Pressure Support (cm H2O) (S)  6 cm H2O     SBT  
   06/27/24 0834   Vent Information   Vent Mode (S)  CPAP/PS   $Ventilation $Subsequent Day   Ventilator Settings   FiO2  30 %   PEEP/CPAP (cmH2O) 5   Pressure Support (cm H2O) 6 cm H2O     sbt  
   06/29/24 1749   Patient Observation   Pulse 100   Respirations 25   SpO2 100 %   Vent Information   Vent Mode (S)  AC/PRVC   Ventilator Settings   FiO2  30 %   Resp Rate (Set) 15 bpm   PEEP/CPAP (cmH2O) 5   Vt (Set, mL) 520 mL     Weaned 1125 to 1749 well, placed back in PRVC mode.  
   06/30/24 0746   Vent Information   Vent Mode (S)  CPAP/PS   Ventilator Settings   PEEP/CPAP (cmH2O) 5   Pressure Support (cm H2O) (S)  8 cm H2O     Pt placed on CPAP/PS trial at this time.  
   07/01/24 0751   Vent Information   Vent Mode (S)  CPAP/PS   Ventilator Settings   FiO2  30 %   PEEP/CPAP (cmH2O) 5   Pressure Support (cm H2O) 8 cm H2O     Pt placed on CPAP/PS, vitals are stable at this time.   
   Daily Progress Note  Neuro Critical Care    Patient Name: Timothy Arellano  Patient : 1950  Room/Bed: 0121/0121-01  Code Status: Full  Allergies:   Allergies   Allergen Reactions    Ciprofloxacin     Sulfa Antibiotics        CHIEF COMPLAINT:      Unresponsive, Left MCA infarct      INTERVAL HISTORY    Initial Presentation (Admitted 2024):  The patient is a 73 y.o. male with significant past medical history of atrial fibrillation for which he takes Eliquis, BPH, CHF, diabetes, hypertension, hyperlipidemia, and Parkinson's disease.  Patient was taken to an outside hospital today after being found down at home unresponsive with sonorous breathing per EMS neighbor had contacted them this morning after patient had left his garbage can of the curb and not brought it and felt this was unusual for the patient.  Last known well was 48 hours prior to EMS arrival. On arrival to the outside hospital patient was in A-fib with RVR systolic blood pressure was 140 he was not responsive in any meaningful way tactile or sternal rub patient was intubated for airway protection.  At outside hospital patient had a CT of the head that was concerning for subacute left MCA stroke neuroendovascular was contacted and accepted transfer to [place] patient was also noted to be in rhabdomyolysis, a Juárez catheter was placed at outside facility and fluids of normal saline at 200 cc.       Patient's arrival to [facility] patient was on no sedation when patient was initially evaluated by the stroke team he was not withdrawing to noxious stimuli, had a conjugate gaze.  Repeat CT and CTA were completed that showed a moderate-sized acute left MCA infarction with no intracranial hemorrhage.  There is a large left subgaleal scalp fluid collection which is a possible hematoma.  CTP shows 31 mL perfusion mismatch in the left frontal and parietal lobes.  Patient was also found to have lactic acidosis of 4.7.  Neurocritical care 
   Daily Progress Note  Neuro Critical Care    Patient Name: Timothy Arellano  Patient : 1950  Room/Bed: 0121/0121-01  Code Status: Full  Allergies:   Allergies   Allergen Reactions    Ciprofloxacin     Sulfa Antibiotics        CHIEF COMPLAINT:      Unresponsive, Left MCA infarct      INTERVAL HISTORY    Initial Presentation (Admitted 2024):  The patient is a 73 y.o. male with significant past medical history of atrial fibrillation for which he takes Eliquis, BPH, CHF, diabetes, hypertension, hyperlipidemia, and Parkinson's disease.  Patient was taken to an outside hospital today after being found down at home unresponsive with sonorous breathing per EMS neighbor had contacted them this morning after patient had left his garbage can of the curb and not brought it and felt this was unusual for the patient.  Last known well was 48 hours prior to EMS arrival. On arrival to the outside hospital patient was in A-fib with RVR systolic blood pressure was 140 he was not responsive in any meaningful way tactile or sternal rub patient was intubated for airway protection.  At outside hospital patient had a CT of the head that was concerning for subacute left MCA stroke neuroendovascular was contacted and accepted transfer to [place] patient was also noted to be in rhabdomyolysis, a Juárez catheter was placed at outside facility and fluids of normal saline at 200 cc.       Patient's arrival to [facility] patient was on no sedation when patient was initially evaluated by the stroke team he was not withdrawing to noxious stimuli, had a conjugate gaze.  Repeat CT and CTA were completed that showed a moderate-sized acute left MCA infarction with no intracranial hemorrhage.  There is a large left subgaleal scalp fluid collection which is a possible hematoma.  CTP shows 31 mL perfusion mismatch in the left frontal and parietal lobes.  Patient was also found to have lactic acidosis of 4.7.  Neurocritical care 
   Daily Progress Note  Neuro Critical Care    Patient Name: Timothy Arellano  Patient : 1950  Room/Bed: 0121/0121-01  Code Status: Full  Allergies:   Allergies   Allergen Reactions    Ciprofloxacin     Sulfa Antibiotics        CHIEF COMPLAINT:      Unresponsive, Left MCA infarct      INTERVAL HISTORY    Initial Presentation (Admitted 2024):  The patient is a 73 y.o. male with significant past medical history of atrial fibrillation for which she takes Eliquis, BPH, CHF, diabetes, hypertension, hyperlipidemia, and Parkinson's disease.  Patient was taken to an outside hospital today after being found down at home unresponsive with sonorous breathing per EMS neighbor had contacted them this morning after patient had left his garbage can of the curb and not brought it and felt this was unusual for the patient.  Last known well was 48 hours prior to EMS arrival. On arrival to the outside hospital patient was in A-fib with RVR systolic blood pressure was 140 he was not responsive in any meaningful way tactile or sternal rub patient was intubated for airway protection.  At outside hospital patient had a CT of the head that was concerning for subacute left MCA stroke neuroendovascular was contacted and accepted transfer to [place] patient was also noted to be in rhabdomyolysis, a Juárez catheter was placed at outside facility and fluids of normal saline at 200 cc.      Patient's arrival to [facility] patient was on no sedation when patient was initially evaluated by the stroke team he was not withdrawing to noxious stimuli, had a conjugate gaze.  Repeat CT and CTA were completed that showed a moderate-sized acute left MCA infarction with no intracranial hemorrhage.  There is a large left subgaleal scalp fluid collection which is a possible hematoma.  CTP shows 31 mL perfusion mismatch in the left frontal and parietal lobes.  Patient was also found to have lactic acidosis of 4.7.  Neurocritical care 
   Daily Progress Note  Neuro Critical Care    Patient Name: Timothy Arellano  Patient : 1950  Room/Bed: 0121/0121-01  Code Status: Full  Allergies:   Allergies   Allergen Reactions    Ciprofloxacin     Sulfa Antibiotics        CHIEF COMPLAINT:      Unresponsive, Left MCA infarct      INTERVAL HISTORY    Initial Presentation (Admitted 2024):  The patient is a 73 y.o. male with significant past medical history of atrial fibrillation for which she takes Eliquis, BPH, CHF, diabetes, hypertension, hyperlipidemia, and Parkinson's disease.  Patient was taken to an outside hospital today after being found down at home unresponsive with sonorous breathing per EMS neighbor had contacted them this morning after patient had left his garbage can of the curb and not brought it and felt this was unusual for the patient.  Last known well was 48 hours prior to EMS arrival. On arrival to the outside hospital patient was in A-fib with RVR systolic blood pressure was 140 he was not responsive in any meaningful way tactile or sternal rub patient was intubated for airway protection.  At outside hospital patient had a CT of the head that was concerning for subacute left MCA stroke neuroendovascular was contacted and accepted transfer to [place] patient was also noted to be in rhabdomyolysis, a Juárez catheter was placed at outside facility and fluids of normal saline at 200 cc.      Patient's arrival to [facility] patient was on no sedation when patient was initially evaluated by the stroke team he was not withdrawing to noxious stimuli, had a conjugate gaze.  Repeat CT and CTA were completed that showed a moderate-sized acute left MCA infarction with no intracranial hemorrhage.  There is a large left subgaleal scalp fluid collection which is a possible hematoma.  CTP shows 31 mL perfusion mismatch in the left frontal and parietal lobes.  Patient was also found to have lactic acidosis of 4.7.  Neurocritical care 
   Daily Progress Note  Neuro Critical Care    Patient Name: Timothy Arellano  Patient : 1950  Room/Bed: 0121/0121-01  Code Status: Full  Allergies:   Allergies   Allergen Reactions    Ciprofloxacin     Sulfa Antibiotics        CHIEF COMPLAINT:      Unresponsive, Left MCA infarct      INTERVAL HISTORY    Initial Presentation (Admitted 2024):  The patient is a 73 y.o. male with significant past medical history of atrial fibrillation for which she takes Eliquis, BPH, CHF, diabetes, hypertension, hyperlipidemia, and Parkinson's disease.  Patient was taken to an outside hospital today after being found down at home unresponsive with sonorous breathing per EMS neighbor had contacted them this morning after patient had left his garbage can of the curb and not brought it and felt this was unusual for the patient.  Last known well was 48 hours prior to EMS arrival. On arrival to the outside hospital patient was in A-fib with RVR systolic blood pressure was 140 he was not responsive in any meaningful way tactile or sternal rub patient was intubated for airway protection.  At outside hospital patient had a CT of the head that was concerning for subacute left MCA stroke neuroendovascular was contacted and accepted transfer to [place] patient was also noted to be in rhabdomyolysis, a Juárez catheter was placed at outside facility and fluids of normal saline at 200 cc.      Patient's arrival to [facility] patient was on no sedation when patient was initially evaluated by the stroke team he was not withdrawing to noxious stimuli, had a conjugate gaze.  Repeat CT and CTA were completed that showed a moderate-sized acute left MCA infarction with no intracranial hemorrhage.  There is a large left subgaleal scalp fluid collection which is a possible hematoma.  CTP shows 31 mL perfusion mismatch in the left frontal and parietal lobes.  Patient was also found to have lactic acidosis of 4.7.  Neurocritical care 
   Daily Progress Note  Neuro Critical Care    Patient Name: Timothy Arellano  Patient : 1950  Room/Bed: 0121/0121-01  Code Status: Full  Allergies:   Allergies   Allergen Reactions    Ciprofloxacin     Sulfa Antibiotics        CHIEF COMPLAINT:      Unresponsive, Left MCA infarct      INTERVAL HISTORY    Initial Presentation (Admitted 2024):  The patient is a 73 y.o. male with significant past medical history of atrial fibrillation for which she takes Eliquis, BPH, CHF, diabetes, hypertension, hyperlipidemia, and Parkinson's disease.  Patient was taken to an outside hospital today after being found down at home unresponsive with sonorous breathing per EMS neighbor had contacted them this morning after patient had left his garbage can of the curb and not brought it and felt this was unusual for the patient.  Last known well was 48 hours prior to EMS arrival. On arrival to the outside hospital patient was in A-fib with RVR systolic blood pressure was 140 he was not responsive in any meaningful way tactile or sternal rub patient was intubated for airway protection.  At outside hospital patient had a CT of the head that was concerning for subacute left MCA stroke neuroendovascular was contacted and accepted transfer to [place] patient was also noted to be in rhabdomyolysis, a Juárez catheter was placed at outside facility and fluids of normal saline at 200 cc.       Patient's arrival to [facility] patient was on no sedation when patient was initially evaluated by the stroke team he was not withdrawing to noxious stimuli, had a conjugate gaze.  Repeat CT and CTA were completed that showed a moderate-sized acute left MCA infarction with no intracranial hemorrhage.  There is a large left subgaleal scalp fluid collection which is a possible hematoma.  CTP shows 31 mL perfusion mismatch in the left frontal and parietal lobes.  Patient was also found to have lactic acidosis of 4.7.  Neurocritical 
   Daily Progress Note  Neuro Critical Care    Patient Name: Timothy Arellano  Patient : 1950  Room/Bed: 0121/0121-01  Code Status: Full  Allergies:   Allergies   Allergen Reactions    Ciprofloxacin     Sulfa Antibiotics        CHIEF COMPLAINT:      Unresponsive, Left MCA infarct      INTERVAL HISTORY    Initial Presentation (Admitted 2024):  The patient is a 73 y.o. male with significant past medical history of atrial fibrillation for which she takes Eliquis, BPH, CHF, diabetes, hypertension, hyperlipidemia, and Parkinson's disease.  Patient was taken to an outside hospital today after being found down at home unresponsive with sonorous breathing per EMS neighbor had contacted them this morning after patient had left his garbage can of the curb and not brought it and felt this was unusual for the patient.  Last known well was 48 hours prior to EMS arrival. On arrival to the outside hospital patient was in A-fib with RVR systolic blood pressure was 140 he was not responsive in any meaningful way tactile or sternal rub patient was intubated for airway protection.  At outside hospital patient had a CT of the head that was concerning for subacute left MCA stroke neuroendovascular was contacted and accepted transfer to [place] patient was also noted to be in rhabdomyolysis, a Juárez catheter was placed at outside facility and fluids of normal saline at 200 cc.      Patient's arrival to [facility] patient was on no sedation when patient was initially evaluated by the stroke team he was not withdrawing to noxious stimuli, had a conjugate gaze.  Repeat CT and CTA were completed that showed a moderate-sized acute left MCA infarction with no intracranial hemorrhage.  There is a large left subgaleal scalp fluid collection which is a possible hematoma.  CTP shows 31 mL perfusion mismatch in the left frontal and parietal lobes.  Patient was also found to have lactic acidosis of 4.7.  Neurocritical care 
  [facility]  Occupational Therapy Not Seen Note    DATE: 2024    NAME: Timothy Arellano  MRN: [id number]   : 1950      Patient not seen this date for Occupational Therapy due to:    Surgery/Procedure: OR for ESOPHAGOGASTRODUODENOSCOPY PERCUTANEOUS ENDOSCOPIC GASTROSTOMY TUBE INSERTION     Next Scheduled Treatment: Ck     Electronically signed by Loretta Iniguez OT on 2024 at 7:44 AM    
  Attending Physician Statement  I have discussed the case, including pertinent history and exam findings with the resident and the team.  I have seen and examined the patient and the key elements of the encounter have been performed by me.  I agree with the assessment, plan and orders as documented by the resident.      Review of Systems:   Unable to obtain because of pt condition    73 years old gentleman who was initially admitted to neuro ICU for left MCA stroke.  Now transferred out off neuro ICU to neurology team.  History of atrial fibrillation on Eliquis but anticoagulation is currently held because of hemorrhagic transformation.  Also ejection fraction is decreased.  Cardiology has been consulted but might not be candidate for ischemic workup because of his hemorrhagic transformation and mentation.  Blood sugars are stable, continue Lantus at lower dose and monitor sugars.  Continue tube feeding.  Add free water.  Monitor electrolytes  White count elevation could be reactive, if persistent or spikes fever then we will check cultures  Rest of the management per primary team      Rosa Smith MD, FACP  Attending Physician, Internal Medicine Service    Internal Medicine Residency Program  7/4/2024, 10:41 PM    
  [facility]  Occupational Therapy Not Seen Note    DATE: 2024    NAME: Timothy Arellano  MRN: [id number]   : 1950      Patient not seen this date for Occupational Therapy due to:    Patient is not appropriate for active participation in OT evaluation/treatment at this time d/t not following commands upon attempt.       Electronically signed by Loretta Iniguez OT on 2024 at 12:08 PM    
  Physician Progress Note      PATIENT:               ALBERT ULRICH  CSN #:                  [id number]  :                       1950  ADMIT DATE:       2024 1:50 PM  DISCH DATE:  RESPONDING  PROVIDER #:        Rosette Stein MD          QUERY TEXT:    Pt admitted with Cerebral infarction and has shock documented.  If possible,   please document in progress notes and discharge summary further specificity   regarding the type of shock:    The medical record reflects the following:  Risk Factors: Age 73. Cerebral Infarction, Acute hypoxic respiratory failure,   Shock, Rhabomyolysis, CGF HTN, CAP VS ASP PNA  Clinical Indicators: H/P Found down unresponsive. Last well known 48hrs prior   to EMS.  At outside hospital patient had a CT of the head that was concerning   for subacute left MCA stroke. Repeat CT and CTA were completed that showed a   moderate-sized acute left MCA infarction with no intracranial hemorrhage.    There is a large left subgaleal scalp fluid collection which is a possible   hematoma.  CTP shows 31 mL perfusion mismatch in the left frontal and parietal   lobes. Leukocytosis Lactic acidosis  CAP vs aspiration pna  Tmax afebrile  Zosyn q 8 hours  Patient was also found   to have lactic acidosis of 4.7 H&H 15.1/46.7WBC 20.6>17.4. HR . B/P's   78/62, 88/72, 91/65. Total CK 20,923>17,403. Intubated at [facility]. NIH 24.    Treatment: .9 NS 200cc/hr, Neosynephrine gtt, CT Head, MRI Brain, Stroke   Comsult, IV Zosyn    Any question  Please call:  Karina Yang RN,BSN,[facility]  M-F  6AM-230PM  [phone]  Options provided:  -- Cardiogenic Shock  -- Hemorrhagic Shock  -- Neurogenic Shock  -- Traumatic Shock  -- Obstructive Shock  -- Septic Shock  -- Hypovolemic Shock  -- Other - I will add my own diagnosis  -- Disagree - Not applicable / Not valid  -- Disagree - Clinically unable to determine / Unknown  -- Refer to Clinical Documentation Reviewer    PROVIDER RESPONSE TEXT:    This 
  [facility] - [facility]     Emergency/Trauma Note    PATIENT NAME: Eileen Aguirre    Shift date: 06/20/2024  Shift day: Thursday   Shift # 1    Room # 14/14     Name: Eileen Aguirre            Age: 73 y.o.  Gender: male          Roman Catholic: Buddhism   Place of Muslim: None    Trauma/Incident type: Stroke Alert  Admit Date & Time: 6/20/2024  1:50 PM  TRAUMA NAME: None    ADVANCE DIRECTIVES IN CHART?  No    NAME OF DECISION MAKER:     RELATIONSHIP OF DECISION MAKER TO PATIENT:     PATIENT/EVENT DESCRIPTION:  Eileen Aguirre is a 73 y.o. male who arrived via ground ambulance as stroke alert. Patient was a transfer from [facility]. Patient was intubated and unresponsive.Patient to be admitted to 14/14.       SPIRITUAL ASSESSMENT-INTERVENTION-OUTCOME:  No spiritual assessment was carried out because patient was unresponsive. Per report, patient was raised Buddhism but had not been practicing. Patient received sacrament of anointing of the sick. Family was not present at the time.  called patient's neighbor, Darcie Donovanwhitleyyvon, at [phone] and she said ED doctor had updated her on patient's condition. Darcie said she called and notified patient's brother, Ray ([phone]). Per Darcie, Ray would asked his other brother, Lamine, to come to [facility]. Per Darcie, patient lost his wife some years ago and they did not have children together. Darcie said patient had stepchildren but they were not in communication with him. Darcie asked  to call patient's friend, Nishant Guevara, who is an Lutheran  to visit.  called  Nishant at [phone].and he said he was on his way.        PATIENT BELONGINGS:  No belongings noted    ANY BELONGINGS OF SIGNIFICANT VALUE NOTED:  Unknown    REGISTRATION STAFF NOTIFIED?  Yes    WHAT IS YOUR SPIRITUAL CARE PLAN FOR THIS PATIENT?:  Follow up visits recommended for ongoing assessment of patient's condition and for more prayers and support. 
  [facility]  Internal Medicine Teaching Residency Program  Inpatient Daily Progress Note  ______________________________________________________________________________    Patient: Timothy Arellano  YOB: 1950   MRN:[id number]    Acct: [id number]     Room: 0134/0134-01  Admit date: 6/20/2024  Today's date: 07/08/24  Number of days in the hospital: 18    SUBJECTIVE   CC: Respiratory Distress    Pt examined at bedside. Chart & results reviewed.   -VSS, pt is saturating well on nasal cannula 1 l/min. No hemodynamic instability noted. Patient in stable AF. Mentation at baseline. Some purposeful hand movements seen.   -Straight cath placed today drained around 400 ml of urine. Urine culture grows Enterobacter. Patient is started on iv ceftriaxone today.  -labs reviewed. Blood glucose levels still around 200 mg/dl. On lantus 25 units OD and lispro according to high dose s/s.  -no acute events overnight.     ROS:  Unable to review due to patient's current mentation    BRIEF HISTORY     The patient is a pleasant 73 y.o. male was found unconscious and unresponsive at his home and taken to an outside facility for further evaluation. The patient was found to have A-fib with RVR and systolic bp of 140, unresponsive to painful stimuli. He was diagnosed with a subacute left MCA stroke and transferred to [facility].  Patient was admitted under Neurology ICU. CT scan at the time showed left MCA stroke.  Patient got intubated due to poor GCS and was started on Zosyn for concern of aspiration pneumonia.  Last HbA1C (6/21)  9.5%     Hospital Course:     Timothy Arellano is a 73 y.o. with past medical history of atrial fibrillation on Eliquis and digoxin as home medication, type 2 diabetes, hypertension dyslipidemia who was initially found down and was taken to the [facility].  Stat CT head and CTA was completed.  The results of of the scan showed patient to have an 
0000: Dr. Blue at bedside for art line insertion.   
0230: Dr. Blue at bedside for CVC placement  
[facility]   Progress Note                   Date:   7/7/2024  Patient name: Timothy Arellano  Date of admission:  6/20/2024  1:50 PM  MRN:   [id number]  YOB: 1950  PCP: Kevyn Castillo MD    Reason for Admission: Acute ischemic left MCA stroke (HCC) [I63.512]  Non-traumatic rhabdomyolysis [M62.82]  Ischemic stroke (HCC) [I63.9]  Pneumonia due to infectious organism, unspecified laterality, unspecified part of lung [J18.9]    Subjective:       Clinical Changes / Abnormalities:Pt seen and examined in the room.  Pt resting quietly.  RN reports more alert today.   Labs, vitals and tele reviewed-  remains afib       Medications:   Scheduled Meds:   insulin glargine  15 Units SubCUTAneous Daily    atorvastatin  80 mg PEG Tube Nightly    carbidopa-levodopa  1 tablet PEG Tube TID    digoxin  125 mcg PEG Tube Daily    doxazosin  4 mg PEG Tube Daily    furosemide  20 mg Per NG tube Daily    metoprolol tartrate  25 mg PEG Tube BID    modafinil  200 mg PEG Tube Daily    [Held by provider] sacubitril-valsartan  1 tablet PEG Tube BID    cefepime  2,000 mg IntraVENous Q12H    lansoprazole  30 mg Per G Tube QAM AC    levETIRAcetam  500 mg IntraVENous Q12H    spironolactone  12.5 mg Oral Daily    [Held by provider] enoxaparin  30 mg SubCUTAneous BID    insulin lispro  0-16 Units SubCUTAneous Q6H    sodium chloride flush  5-40 mL IntraVENous 2 times per day     Continuous Infusions:   sodium chloride      dextrose       CBC:   Recent Labs     07/05/24  1412 07/06/24  1058 07/07/24  0633   WBC 12.4* 9.1 8.6   HGB 13.9 12.9* 12.7*    313 304       BMP:    Recent Labs     07/05/24  1412 07/06/24  1058 07/07/24  0633    137 139   K 5.2 4.7 4.1   CL 99 100 103   CO2 23 26 26   BUN 29* 29* 35*   CREATININE 0.8 0.8 0.7   GLUCOSE 176* 199* 207*       Hepatic: No results for input(s): \"AST\", \"ALT\", \"BILITOT\", \"ALKPHOS\" in the last 72 hours.    Invalid input(s): \"ALB\"  Troponin:   Recent Labs     
[facility]   Progress Note                   Date:   7/8/2024  Patient name: Timothy Arellano  Date of admission:  6/20/2024  1:50 PM  MRN:   [id number]  YOB: 1950  PCP: Kevyn Castillo MD    Reason for Admission: Acute ischemic left MCA stroke (HCC) [I63.512]  Non-traumatic rhabdomyolysis [M62.82]  Ischemic stroke (HCC) [I63.9]  Pneumonia due to infectious organism, unspecified laterality, unspecified part of lung [J18.9]    Subjective:       Clinical Changes / Abnormalities:Pt seen and examined in the room alone. Not following commands for me this AM. Opens eyes to voice is all.  No acute CV issues/concern overnight. Labs, vitals, & tele reviewed. Remains Afib 70-90s with odd PVCs      Medications:   Scheduled Meds:   insulin glargine  25 Units SubCUTAneous Daily    cefTRIAXone (ROCEPHIN) IV  1,000 mg IntraVENous Q24H    metoprolol tartrate  37.5 mg PEG Tube BID    atorvastatin  80 mg PEG Tube Nightly    carbidopa-levodopa  1 tablet PEG Tube TID    digoxin  125 mcg PEG Tube Daily    doxazosin  4 mg PEG Tube Daily    furosemide  20 mg Per NG tube Daily    modafinil  200 mg PEG Tube Daily    [Held by provider] sacubitril-valsartan  1 tablet PEG Tube BID    lansoprazole  30 mg Per G Tube QAM AC    levETIRAcetam  500 mg IntraVENous Q12H    spironolactone  12.5 mg Oral Daily    [Held by provider] enoxaparin  30 mg SubCUTAneous BID    insulin lispro  0-16 Units SubCUTAneous Q6H    sodium chloride flush  5-40 mL IntraVENous 2 times per day     Continuous Infusions:   sodium chloride      dextrose       CBC:   Recent Labs     07/06/24  1058 07/07/24  0633 07/08/24  0534   WBC 9.1 8.6 10.9   HGB 12.9* 12.7* 12.8*    304 306       BMP:    Recent Labs     07/06/24  1058 07/07/24  0633 07/08/24  0534    139 142   K 4.7 4.1 4.6    103 107   CO2 26 26 30   BUN 29* 35* 29*   CREATININE 0.8 0.7 0.7   GLUCOSE 199* 207* 221*       Hepatic: No results for input(s): \"AST\", \"ALT\", 
[facility]   Progress Note                   Date:   7/9/2024  Patient name: Timothy Arellano  Date of admission:  6/20/2024  1:50 PM  MRN:   [id number]  YOB: 1950  PCP: Kevyn Castillo MD    Reason for Admission: Acute ischemic left MCA stroke (HCC) [I63.512]  Non-traumatic rhabdomyolysis [M62.82]  Ischemic stroke (HCC) [I63.9]  Pneumonia due to infectious organism, unspecified laterality, unspecified part of lung [J18.9]    Subjective:       Clinical Changes / Abnormalities: Pt seen and examined in the room.  Patient resting in bed. Patient drowsy and is not following commands or waking up during examination. Labs, vitals and tele reviewed- SR     Medications:   Scheduled Meds:   insulin glargine  30 Units SubCUTAneous Daily    cefTRIAXone (ROCEPHIN) IV  1,000 mg IntraVENous Q24H    sacubitril-valsartan  0.5 tablet Oral BID    amantadine  100 mg Per G Tube BID    metoprolol tartrate  37.5 mg PEG Tube BID    atorvastatin  80 mg PEG Tube Nightly    carbidopa-levodopa  1 tablet PEG Tube TID    digoxin  125 mcg PEG Tube Daily    doxazosin  4 mg PEG Tube Daily    furosemide  20 mg Per NG tube Daily    lansoprazole  30 mg Per G Tube QAM AC    levETIRAcetam  500 mg IntraVENous Q12H    spironolactone  12.5 mg Oral Daily    [Held by provider] enoxaparin  30 mg SubCUTAneous BID    insulin lispro  0-16 Units SubCUTAneous Q6H    sodium chloride flush  5-40 mL IntraVENous 2 times per day     Continuous Infusions:   sodium chloride      dextrose       CBC:   Recent Labs     07/07/24  0633 07/08/24  0534 07/09/24  0645   WBC 8.6 10.9 8.3   HGB 12.7* 12.8* 13.1    306 271       BMP:    Recent Labs     07/07/24  0633 07/08/24  0534 07/09/24  0645    142 141   K 4.1 4.6 4.9    107 104   CO2 26 21 25   BUN 35* 29* 27*   CREATININE 0.7 0.7 0.7   GLUCOSE 207* 221* 242*       Hepatic: No results for input(s): \"AST\", \"ALT\", \"BILITOT\", \"ALKPHOS\" in the last 72 hours.    Invalid input(s): 
Comprehensive Nutrition Assessment    Type and Reason for Visit:  Reassess    Nutrition Recommendations/Plan:   Continue Diabetic (Glucerna 1.5) TF at goal rate of 55 ml/hr, providing 1980 kcals, 109 gm protein, 1002 ml free water.  Continue Wound Healing Supplement BID.  To monitor nutrition intake/tolerance, labs, weight, plan of care.     Malnutrition Assessment:  Malnutrition Status:  At risk for malnutrition (Comment) (07/10/24 1153)    Context:  Acute Illness     Findings of the 6 clinical characteristics of malnutrition:  Energy Intake:  Unable to assess  Weight Loss:  No significant weight loss     Body Fat Loss:  No significant body fat loss     Muscle Mass Loss:  No significant muscle mass loss    Fluid Accumulation:  Moderate to Severe Extremities   Strength:  Not Performed    Nutrition Assessment:    Pt asleep at time of visit. Diabetic TF running @ goal rate of 55 ml/hr via PEG tube. Wound Healing supplement given BID per RN documentation.  Weight history reviewed - no new wt since 7/3. Per previous RD note on 7/6, 12% significant weight loss, if accurate. LBM: 7/9. No nausea/emesis per chart review. Meds: lasix, lantus, Humalog. Labs: Glu 171-219 mg/dL (H). BUN 25 mg/dL (H). Cr 0.6 mg/dL (L).    Nutrition Related Findings:    Meds/labs reviewed. +2 pitting BUE edema. +2 non-pitting BLE edema. Wound Type: Pressure Injury, Stage II       Current Nutrition Intake & Therapies:    Average Meal Intake: NPO  Average Supplements Intake: NPO  ADULT TUBE FEEDING; PEG; Diabetic; Continuous; 20; Yes; 10; Q 4 hours; 55; 125; Q 3 hours; Wound Healing; 1 Dose; BID    Anthropometric Measures:  Height: 172.7 cm (5' 7.99\")  Ideal Body Weight (IBW): 154 lbs (70 kg)    Current Body Weight: 119.1 kg (262 lb 9.1 oz), 171.4 % IBW. Weight Source: Bed Scale  Current BMI (kg/m2): 39.9  Weight Adjustment For: No Adjustment  BMI Categories: Obese Class 2 (BMI 35.0 -39.9)    Estimated Daily Nutrient Needs:  Energy Requirements 
Comprehensive Nutrition Assessment    Type and Reason for Visit:  Reassess    Nutrition Recommendations/Plan:   Continue Peptide Based (Vital AF 1.2) with goal rate of 45 ml/hr + Protein Modular TID. FWF 95ml q3h per MD  Tube feed at goal volume (1080 ml/day) and Protein Modular TID provides 1608 kcals, 159 gm protein, and 876 ml free water, meeting 100% of estimated nutrition needs.  To monitor nutrition intake/tolerance, labs, weight, and plan of care.     Malnutrition Assessment:  Malnutrition Status:  At risk for malnutrition (Comment) (intubation, acute stroke) (06/21/24 1108)    Context:  Acute Illness     Findings of the 6 clinical characteristics of malnutrition:  Energy Intake:  Unable to assess  Weight Loss:  No significant weight loss     Body Fat Loss:  No significant body fat loss     Muscle Mass Loss:  No significant muscle mass loss (visual)    Fluid Accumulation:  No significant fluid accumulation     Strength:  Not Performed    Nutrition Assessment:    F/u. Pt remains intubated, off sedation, no pressors. TF infusing at goal, pt appears to be tolerating well. Will continue to follow per protocol, meds and labs reviewed.    Nutrition Related Findings:    No GI symptoms noted, LBM 6/27. +2 bilat UEand +3 bilat LE edema noted. Wound Type: Pressure Injury, Stage I (L hip)       Current Nutrition Intake & Therapies:    Average Meal Intake: NPO  Average Supplements Intake: NPO  Diet NPO  ADULT TUBE FEEDING; Nasogastric; Peptide Based; Continuous; 45; No; 95; Q 3 hours; Protein; 1 Dose; TID    Anthropometric Measures:  Height: 172.7 cm (5' 7.99\")  Ideal Body Weight (IBW): 154 lbs (70 kg)       Current Body Weight: 135.6 kg (298 lb 15.1 oz), 194.1 % IBW. Weight Source: Bed Scale  Current BMI (kg/m2): 45.5        Weight Adjustment For: No Adjustment                 BMI Categories: Obese Class 3 (BMI 40.0 or greater)    Estimated Daily Nutrient Needs:  Energy Requirements Based On: Kcal/kg  Weight Used 
Comprehensive Nutrition Assessment    Type and Reason for Visit:  Reassess    Nutrition Recommendations/Plan:   Continue current TF order Peptide Based at 45 mL/hr continuous with protein modular TID to provide 1608 kcal, 159 gm protein, 875 mL free water. With flushes = 1055 mL water per day.  Weigh pt as able, ordered weekly weights    11: 56   Noted extubation, recommend continue TF until adequate PO is established (>60% of meals)    Monitor TF tolerance, adequacy, weight, labs, GI status, fluid status, skin integrity.     Malnutrition Assessment:  Malnutrition Status:  At risk for malnutrition (Comment) (intubation, acute stroke) (06/21/24 1108)    Context:  Acute Illness       Nutrition Assessment:    Continues intubated, no sedation. +NGT. Tolerating Peptide Based TF at 45 mL/hr goal rate with protein modular TID. LBM 6/29, active bowel sounds. Labs include Glu 159-173 mg/dL. Meds include Lasix, Lantus 36 u, Humalog SS, Lopressor. No new weight x 7 days.    Nutrition Related Findings:    +2 generalized, +2 BUE, +3 BLE edema. Wound Type: Pressure Injury (hip)       Current Nutrition Intake & Therapies:    Average Meal Intake: NPO  Average Supplements Intake: NPO  Diet NPO  ADULT TUBE FEEDING; Nasogastric; Peptide Based; Continuous; 45; No; 30; Q 4 hours; Protein; 1 Dose; TID  Current Tube Feeding (TF) Orders:  Feeding Route: Nasogastric  Formula: Diabetic  Schedule: Continuous  Feeding Regimen: 45 mL/hr (1080 mL/day)  Additives/Modulars: Protein (TID)  Water Flushes: 30 mL q 4 hours  Current TF & Flush Orders Provides: 1608 kcal, 159 gm protein, 875 mL free water. With flushes = 1055 mL water per day  Goal TF & Flush Orders Provides: -  Additional Calorie Sources:  None    Anthropometric Measures:  Height: 172.7 cm (5' 7.99\")  Ideal Body Weight (IBW): 154 lbs (70 kg)    Current Body Weight: 135.6 kg (298 lb 15.1 oz), 194.1 % IBW. Weight Source: Bed Scale  Current BMI (kg/m2): 45.5  Weight Adjustment For: No 
Comprehensive Nutrition Assessment    Type and Reason for Visit:  Reassess    Nutrition Recommendations/Plan:   Recommend changing tube feed to Peptide Based (Vital AF 1.2) with goal rate of 45 ml/hr while the patient is vent dependent with no current propofol.  Recommend increasing Protein Modular to TID.  Tube feed at goal volume (1080 ml/day) and Protein Modular TID provides 1608 kcals, 159 gm protein, and 876 ml free water, meeting 100% of estimated nutrition needs.  To monitor nutrition intake/tolerance, labs, weight, and plan of care.     Malnutrition Assessment:  Malnutrition Status:  At risk for malnutrition (Comment) (intubation, acute stroke) (06/21/24 1108)    Context:  Acute Illness     Findings of the 6 clinical characteristics of malnutrition:  Energy Intake:  Unable to assess  Weight Loss:  No significant weight loss     Body Fat Loss:  No significant body fat loss     Muscle Mass Loss:  No significant muscle mass loss (visual)    Fluid Accumulation:  No significant fluid accumulation     Strength:  Not Performed    Nutrition Assessment:    Patient remains intubated, no sedation or pressor running. At visit, noted tube feed running at goal rate of 45 ml/hr. Per kangaroo pump, patient received 972 ml (1458 kcals, 80 gm protein) of tube feed over the past 24 hours. Per documentation, patient received 1 dose of proteinex this morning. No nausea/emesis, last BM 6/25. New documented weight fom 6/24 noted- indicates to weight loss since admit. To note, patient with edema. Labs include:  mg/dL (H), POC -269 mg/dL (past 36 hours). Meds include: carbidopa/levodopa, lasix, humalog, lantus.    Nutrition Related Findings:    +2 edema to extremities per chart. Wound Type: Pressure Injury, Stage I (L hip)       Current Nutrition Intake & Therapies:    Average Meal Intake: NPO  Average Supplements Intake: NPO  Diet NPO  ADULT TUBE FEEDING; Nasogastric; Diabetic; Continuous; 20; Yes; 15; Q 4 hours; 
[facility] -Progress Note    Today's Date and Time: 7/9/2024, 5:56 AM    Impression :   Enterobacter Cloacae UTI  Subacute left MCA stroke  Afib RVR  G tube placement 7/5/24  DM II  Sulfa allergy  Cipro allergy    Recommendations:   Continue IV rocephin 1 gm daily for Enterobacter Cloacae on urine culture-End date 7/12/24    Medical Decision Making/Summary/Discussion:7/9/2024     Daily Elements of Decision Making provided by Consulting Physician:    Note: I have independently performed the steps listed below as part of the medical decision making and evaluation.    Review of current Problems:  Today I am seeing the patient for the following problems:  Concern for UTI  Subacute left MCA stroke  Afib RVR  G tube placement 7/5/24  DM II  Parkinson's disease  Sulfa allergy  Cipro allergy  Evaluation of Patient:  Patient with subacute Lt MCA stroke  Evaluated for UTI  Please see daily details in Interval Changes Section  Changes in physical exam:  Sub acute stroke  A fib RVR  PEG tube placement  Please see daily details in Physical Exam section and in Interval Changes Section  Changes in ROS:  Unchanged  Please see daily details in Review of Symptoms section and in Interval Changes Section  Discussion with Referring Physician or Service  Dr. Chamberlain  Laboratory and Radiologic Studies with personal review of radiologic studies  Laboratory  Radiology  Microbiology  Urine 7-5-24 : pending  Please see Details in daily Interval Changes Section devoted to Lab, Micro and Radiology and in Medical Decision Laboratory, Imaging and Cultures sections.  Review of Infection Control and Prevention measures:  Universal precautions  Please see daily details in Infection Control Recommendations section  Administer medications as ordered:  Ceftriaxone  Review of Antimicrobial Stewardship Measures:  Targeted therapy  PK dosing  Please see daily details in Antimicrobial Stewardship Recommendations 
[facility]   IN-PATIENT SERVICE   [facility]     Neurology Resident Progress Note    Patient name:              Timothy Arellano  Date of admission:      6/20/2024  1:50 PM  MRN:                           [id number]  Account:                      [id number]  YOB: 1950  PCP:                            Kevyn Castillo MD  Room:                          134  Code Status:               Full Code     SUBJECTIVE:   The patient was seen and examined and the chart was reviewed.   No acute events overnight   Is able to withdraw to pain on left upper, pushes my hand when paining his LLE  PEG tube placed yesterday  Tube feeds started   NEV recs starting AC in 4 weeks from bleed  Will repeat CT head prior to that            ROS  Review of Systems   Unable to perform ROS: Mental status change        HPI    73 y.o.  Non- / non  male with PMH of afib on eliquis, parkinson's disease, T2DM, GERD, HTN, and HLD who presented due to being found down.  Patient unable to give history at this time due to mental status.  Patient initially presented [facility] after being found down.  Last known well at that time was roughly 48 hours prior to arrival.  Patient was found down outside his home due to being unresponsive with sonorous breathing per his neighbor.     Complicated in [facility] supposedly patient had GCS of 6.  Patient not withdrawing to any noxious stimuli, nonverbal, had conjugate gaze with PERRLA.  CT scan without contrast from acute hospital showed left MCA territory infarct with hypodensity.  He was transferred to [facility] for further evaluation.  Patient was started on Levophed drip at [facility].  He was also found to be in atrial fibrillation.  Also noted to have rhabdomyolysis.  Patient was intubated at [facility] due to concern for airway protection.     [facility] repeat CT head and CTA head were conducted.  
LTM started without MRI compatible leads  
LTME started MRI and CT compatible leads  
Neurology Attending Attestation       I have discussed the care of patient including pertinent history and exam findings, with the Neurology resident.      I have seen and examined the patient and the key elements of all parts of the encounter have been performed by me. I agree with the assessment, plan and orders as documented by the fellow/resident, after I modified exam findings and plan of treatments, and the final version is my approved version of the assessment.        Active problem Left cerebral hemorrhagic infarction . Atrial fibrillation . Encephalopathy . Possible parkinson's. The condition is  he is obtunded grimacing to noxious stimulus not opening eyes with no visual treat . There is plegic right arm and right leg withdrawing left arm and leg . There are no meningeal signs. LTME is connected .He has UTI > 100,00 enterobacter cloacea  on  IV cefepime with ID on case . FU Head CT stable left MCA hemorrhagic infarct with stable left occipital cortical infarction . Discussed with neuro intervention recommending anticoagulation resumption one month post presentation on July 20 .CXR improving pulmonary vascular congestion .  He has had PEG placement . He was placed on provigil 200 mg qd to improve cognition without much improvement. There was concern of parkinson's on sinemet 25/100 po tid . 72 yo male with stroke . He as admitted June 20 found unresponsive  taken to ER last known well 48 hours prior . He has DM ,atrial fibrillation on eliquis . Head CT with left frontal parietal infarction . CTA head and neck with left M2 occlusion focal segment stenosis left vertebral along with 70% stenosis right vertebral artery . He was felt to have completed infarction not candidate for intervention or TNK  .There was acute MI placed on IV heparin an aspirin . Patient developed change in mentation becoming more obtunded felt to be from hemorrhagic transformation within stroke with heparin being stopped along with 
Neurology Resident informed writer that he is planning to perform a Lumbar puncture (LP) on the patient this shift. Patient's TF placed on hold and no insulin coverage will be provided r/t no intake is being administered.   0430 TF resumed because LP will be performed later today after consent from appropriate family is received. Patient remains stable.  Will continue to monitor.  
Occupational Therapy    [facility]  Occupational Therapy Not Seen Note    DATE: 2024    NAME: Timothy Arellano  MRN: [id number]   : 1950      Patient not seen this date for Occupational Therapy due to:    Patient is not appropriate for active participation in OT evaluation/treatment at this time d/t intubation and not actively following any commands per staff.    Next Scheduled Treatment: Attempt on  as appropriate.    Electronically signed by Eliud Thompson OT on 2024 at 8:14 AM    
[facility]  Speech Language Pathology    Date: 7/2/2024  Patient Name: Timothy Arellano  YOB: 1950   AGE: 73 y.o.  MRN: [id number]        Patient Not Available for Speech Therapy     Due to:  [] Testing  [] Hemodialysis  [] Cancelled by RN  [] Surgery   [] Intubation/Sedation/Pain Medication  [] Medical instability  [x] Other: Pt. Not seen due to not being appropriate for active participation. Pt. Briefly opened eyes, but did not respond to name or any commands.    Next scheduled treatment: 7/3/24 or as appropriate  Completed by Krupa Nguyen  Clinician    Co-signed by Sybil Nunez M.S. CCC/SLP    
Order obtained for extubation.  SpO2 of 96% on 30% FiO2.  Patient extubated and placed on 4 liters/min via nasal cannula.   Post extubation SpO2 is 93% with HR  85 bpm and RR 17 breaths/min.    Patient had strong cough that was non-productive. Extubation Well tolerated by patient..     Zamzam Natarajan RCP   11:17 AM  
Palliative care Update:    Rounded to see patient this am.  Patient is having lumbar puncture at bedside currently.    Palliative care will round back.    I called patient's brother Homar to obtain names and numbers for next of kin.     Explained if there is a DPOAH and it is presented to us we will follow that document for medical decision maker.     If no DPOAH document is presented to us we will follow [place] Next of Kin [place].  Hierarchy of next of kin given.     Homar Aguirre is not , no children and his parents are .     Timothy has 5 adult living siblings who would be joint decision makers if needed.  Majority would be decision we go with.     Lamine Tacey- [phone] [facility].  Ray Tacey- [phone] [place].  James Tacey- [phone] home    [phone] [place], [place].  Jewell Tacey- [phone] home    [phone] [facility]  Renetta Tacey- [phone] [place].       Texted palliative care office number to Ray as well as Father Nishant's number as Ray requested.      Kristy Palliative Care Coordinator  Rosa DENSONN, RN, ONN-CG  [facility] [phone]/ [facility] [phone]/ [facility] [phone]   
Physical Therapy        Physical Therapy Cancel Note      DATE: 2024    NAME: Timothy Arellano  MRN: [id number]   : 1950      Patient not seen this date for Physical Therapy due to:    Other: intubated. Discussed with RN. Ck       Electronically signed by Elizabeth Sanchez PT on 2024 at 12:00 PM    
Physical Therapy        Physical Therapy Cancel Note      DATE: 2024    NAME: Timothy Arellano  MRN: [id number]   : 1950      Patient not seen this date for Physical Therapy due to:    Patient is not appropriate for PT evaluation/treatment at this time d/t intubated and sedated      Electronically signed by Tiara Ross PT on 2024 at 3:38 PM      
Physical Therapy        Physical Therapy Cancel Note      DATE: 7/3/2024    NAME: Timothy Arellano  MRN: [id number]   : 1950      Patient not seen this date for Physical Therapy due to:    Patient is not appropriate for PT evaluation/treatment at this time d/t Per RN report medical decline and pt is not responding. PT to continue to follow and address as indicated.       Electronically signed by Chelsea Monique PT on 7/3/2024 at 3:09 PM     
Physical Therapy  Facility/Department: [address] STEPDOWN  Physical Therapy Initial Assessment    Name: Timothy Arellano  : 1950  MRN: [id number]  Date of Service: 2024    Eileen Aguirre is a 73 y.o. male who presents via EMS as a transfer from an outside facility.  Per EMS patient was found down in his home with sonorous respirations earlier today.  Last known well was 2 days prior.  Brought to outside facility where patient required BVM for respirations.  Was intubated at outside facility.  Patient was found to be in atrial fibrillation.  Unknown history.  Patient was found to be hypotensive.  Started on Levophed at outside facility.  Patient was also found to be in rhabdomyolysis.  Juárez catheter placed at outside facility.  Fluids started at outside facility.  CT head was concerning for subacute left MCA stroke.  [facility] at this facility was contacted and accepted for transfer.  On arrival to the emergency department patient is intubated and unable to participate in history at this time.   Pt is currently extubated, on nasal canula, PEG.     Discharge Recommendations:  Further therapy recommended at discharge.        PT Equipment Recommendations  Equipment Needed: No      Patient Diagnosis(es): The primary encounter diagnosis was Acute ischemic left MCA stroke (HCC). Diagnoses of Non-traumatic rhabdomyolysis, Pneumonia due to infectious organism, unspecified laterality, unspecified part of lung, Shock (HCC), and Dysphagia, unspecified type were also pertinent to this visit.  Past Medical History:  has no past medical history on file.  Past Surgical History:  has a past surgical history that includes Gastrostomy tube placement (2024).    Assessment   Pt very lethargic, eyes closed while supine, opened when dangling but still not following commands.  Moves R hand purposefully, attempting to reach his face.  Bed mobility dep +1 for supine to sit HOB elevated; dep+2 for sit to supine.  Dangled EOB 13 
Physical Therapy  Facility/Department: [address] STEPDOWN  Physical Therapy Progress Note    Name: Timothy Arellano  : 1950  MRN: [id number]  Date of Service: 2024    Discharge Recommendations:  Therapy recommended at discharge   PT Equipment Recommendations  Equipment Needed: No      Patient Diagnosis(es): The primary encounter diagnosis was Acute ischemic left MCA stroke (HCC). Diagnoses of Non-traumatic rhabdomyolysis, Pneumonia due to infectious organism, unspecified laterality, unspecified part of lung, Shock (HCC), and Dysphagia, unspecified type were also pertinent to this visit.  Past Medical History:  has no past medical history on file.  Past Surgical History:  has a past surgical history that includes Gastrostomy tube placement (2024); Upper gastrointestinal endoscopy (N/A, 2024); and Upper gastrointestinal endoscopy (N/A, 2024).    Assessment   Body Structures, Functions, Activity Limitations Requiring Skilled Therapeutic Intervention: Decreased functional mobility ;Decreased tolerance to work activity;Decreased strength;Decreased safe awareness;Decreased cognition;Decreased endurance;Decreased balance;Decreased posture  Assessment: Pt dependent for bed mobility, unable to follow any commands.  Pt is unsafe to return to previous living siutation due to limited mobility and strength.  Pt will benefit from continued therapy to improve deficits and progress function.  Therapy Prognosis: Guarded  Barriers to Learning: lethargy, decreased cognition  Activity Tolerance  Activity Tolerance: Patient limited by fatigue;Treatment limited secondary to decreased cognition  Activity Tolerance Comments: opened eyes only briefly with transistion from supine to sitting     Plan   Physical Therapy Plan  General Plan:  (5 visits weekly)  Current Treatment Recommendations: Strengthening, ROM, Balance training, Functional mobility training, Transfer training, Endurance training, Wheelchair mobility training, 
Report called to charge nurse at [facility]. All questions answered.   
SLP ALL NOTES  [facility]  Speech Language Pathology    Date: 7/3/2024  Patient Name: Timothy Arellano  YOB: 1950   AGE: 73 y.o.  MRN: [id number]        Patient Not Available for Speech Therapy     Due to:  [] Testing  [] Hemodialysis  [] Cancelled by RN  [] Surgery   [] Intubation/Sedation/Pain Medication  [] Medical instability  [x] Other: Pt unable to maintain alertness or follow commands    Next scheduled treatment: 7/4/24 as appropriate  Completed by: Sybil Nunez, SLP, M.S. CCC-SLP    
SLP ALL NOTES  [facility]  Speech Language Pathology    Date: 6/21/2024  Patient Name: Timothy Arellano  YOB: 1950   AGE: 73 y.o.  MRN: [id number]        Patient Not Available for Speech Therapy     Due to:  [] Testing  [] Hemodialysis  [] Cancelled by RN  [] Surgery   [x] Intubation/Sedation/Pain Medication  [] Medical instability  [] Other:    Next scheduled treatment: as medically appropriate  Completed by: Jennifer Eckert, SLP, M.S. CCC-SLP     
Secure message sent to primary regarding discharging to [facility] with cooper in place. Per primary ok to discharge with cooper.  
[facility]  Speech Language Pathology    Date: 7/5/2024  Patient Name: Timothy Arellano  YOB: 1950   AGE: 73 y.o.  MRN: [id number]        Patient Not Available for Speech Therapy     Due to:  [] Testing  [] Hemodialysis  [] Cancelled by RN  [] Surgery   [] Intubation/Sedation/Pain Medication  [] Medical instability  [x] Other:  PEG placement    Next scheduled treatment: Later this date as appropriate or 7/8/24  Completed by: Sybil Nunez, SLP, M.S. CCC-SLP    
Writer visited per palliative care list. Nurse stated that pt is unresponsive and have only spoken with family by phone as they live out of town.    Electronically signed by Chaplain Miryam, on 7/9/2024 at 11:01 AM.  [facility]  [phone]   
Ammonia:  No results for input(s): \"AMYLASE\", \"LIPASE\", \"AMMONIA\" in the last 72 hours.    Acute Hepatitis Panel:  No results found for: \"HEPBSAG\", \"HEPCAB\", \"HEPBIGM\", \"HEPAIGM\"    HCV Genotype:  No components found for: \"HEPATITISCGENOTYPE\"    HCV Quantitative:  No results found for: \"HCVQNT\"    LIVER WORK UP:    AFP  No results found for: \"AFP\"    Alpha 1 antitrypsin   No results found for: \"A1A\"    BLAYNE  No results found for: \"BLAYNE\"    AMA  No results found for: \"MITOAB\"    ASMA  No results found for: \"SMOOTHMUSCAB\"    PT/INR  No results for input(s): \"PROTIME\", \"INR\" in the last 72 hours.    Cancer Markers:  CEA:  No results for input(s): \"CEA\" in the last 72 hours.  Ca 125:  No results for input(s): \"\" in the last 72 hours.  Ca 19-9:   Invalid input(s): \"\"  AFP: No results for input(s): \"AFP\" in the last 72 hours.  Lactic acid:Invalid input(s): \"LACTIC ACID\"    Radiology Review:    No results found.      Principal Problem:    Acute ischemic left MCA stroke with hemorrhagic conversion  Active Problems:    SINTIA (acute kidney injury) (HCC)    Encephalopathy    Oropharyngeal dysphagia    HFrEF (heart failure with reduced ejection fraction) (HCC)    Permanent atrial fibrillation (HCC)    Left atrial dilation    Type 2 diabetes mellitus (HCC)    Complicated UTI (urinary tract infection)  Resolved Problems:    Non-traumatic rhabdomyolysis    Pneumonia due to infectious organism    Shock (HCC)    Hyponatremia       GI Impression:    Oral dysphagia status post PEG tube placement 7/5/2024  -Site clean dry and intact, no bleeding drainage or odor  -Bumper slightly snug-adjusted-now moves freely.  -No erythema, no fever or chills overnight  -Binder on in place for diversion    Plan and Recommendations:    Okay to use tube  for feedings, medication administration free water flushes  Keep site clean-wash twice daily with soap and water pat dry.  May apply gauze on the outside of bumper for comfort  Keep binder on in 
or concerns.     [facility]   Garland Mcfarlane, APRN - CNP   [phone]  7/4/2024  12:01 PM    Estimated time of 15 mins reviewing chart, assessing patient and formulating plan of care    This note was created with the assistance of a speech-recognition program.  Although the intention is to generate a document that actually reflects the content of the visit, no guarantees can be provided that every mistake has been identified and corrected by editing.       Attending Physician Statement  I have discussed the care of Timothy Arellano and I have examined the patient myselft and taken ros and hpi , including pertinent history and exam findings,  with the author of this note . I have reviewed the key elements of all parts of the encounter with the nurse practitioner/resident.  I agree with the assessment, plan and orders as documented by the above health care provider     More than 50% of the time was spent taking care of this patient in addition to the nurse practitioner time.  That also included history taking follow-up physical examination and review of system.       PEG placement tomorrow  Please continue to hold Lovenox    Electronically signed by Tito Armenta MD     
rhabdomyolysis resolved  Acute left MCA infarction with hemorrhagic transformation with unchanged cortical infarction in the left occipital lobe  Parkinson disease on Sinemet  Severe encephalopathy secondary to new onset acute left MCA infarction with hemorrhagic transformation and underlying parkinsonism  Type 2 diabeteS    Patient Active Problem List:     Acute ischemic left MCA stroke with hemorrhagic conversion     SINTIA (acute kidney injury) (HCC)     Encephalopathy     Oropharyngeal dysphagia     HFrEF (heart failure with reduced ejection fraction) (HCC)     Permanent atrial fibrillation (HCC)     Left atrial dilation     Type 2 diabetes mellitus (HCC)      Plan of Treatment:   CMP.  Continue Aldactone, lasix, BB. . Will add entresto.  Hold jardiance due to concern for infection.   Afib with RVR.  On BB and Dig.  AC per neuro      Electronically signed by SANDRA HAILE CNP on 7/6/2024 at 8:07 AM  [facility]  [phone]         
30 mg Per G Tube QAM AC    levETIRAcetam  500 mg IntraVENous Q12H    spironolactone  12.5 mg Oral Daily    [Held by provider] enoxaparin  30 mg SubCUTAneous BID    insulin lispro  0-16 Units SubCUTAneous Q6H    sodium chloride flush  5-40 mL IntraVENous 2 times per day     Continuous Infusions:    sodium chloride      dextrose       PRN Medicationsalbuterol, 2.5 mg, Q6H PRN  polyethylene glycol, 17 g, Daily PRN  acetaminophen, 650 mg, Q6H PRN  sodium chloride, , PRN  metoprolol, 5 mg, Q6H PRN  labetalol, 10 mg, Q1H PRN  glucose, 4 tablet, PRN  dextrose bolus, 125 mL, PRN   Or  dextrose bolus, 250 mL, PRN  glucagon (rDNA), 1 mg, PRN  dextrose, , Continuous PRN  sodium chloride flush, 5-40 mL, PRN  ondansetron, 4 mg, Q8H PRN   Or  ondansetron, 4 mg, Q6H PRN        Diagnostic Labs:  CBC:   Recent Labs     07/06/24  1058 07/07/24  0633 07/08/24  0534   WBC 9.1 8.6 10.9   RBC 4.27 4.12* 4.17*   HGB 12.9* 12.7* 12.8*   HCT 40.4* 40.4* 41.2   MCV 94.6 98.1 98.8   RDW 14.7* 14.8* 14.7*    304 306     BMP:   Recent Labs     07/06/24  1058 07/07/24  0633 07/08/24  0534    139 142   K 4.7 4.1 4.6    103 107   CO2 26 26 21   BUN 29* 35* 29*   CREATININE 0.8 0.7 0.7     BNP: No results for input(s): \"BNP\" in the last 72 hours.  PT/INR: No results for input(s): \"PROTIME\", \"INR\" in the last 72 hours.  APTT: No results for input(s): \"APTT\" in the last 72 hours.  CARDIAC ENZYMES: No results for input(s): \"CKMB\", \"CKMBINDEX\", \"TROPONINI\" in the last 72 hours.    Invalid input(s): \"CKTOTAL;3\"  FASTING LIPID PANEL:  Lab Results   Component Value Date    CHOL 126 06/21/2024    HDL 36 (L) 06/21/2024    TRIG 120 06/21/2024     LIVER PROFILE: No results for input(s): \"AST\", \"ALT\", \"BILIDIR\", \"BILITOT\", \"ALKPHOS\" in the last 72 hours.    Invalid input(s): \"ALB\"   MICROBIOLOGY:   Lab Results   Component Value Date/Time    CULTURE ENTEROBACTER CLOACAE COMPLEX >100,000 CFU/ML (A) 07/05/2024 10:55 PM       Imaging:    XR 
7/3/2024  1. Redemonstration of a subacute left MCA infarct with hemorrhagic transformation, unchanged. 2. Unchanged cortical infarct in the left occipital lobe. 3. No new infarct or worsening hemorrhage.     CT HEAD WO CONTRAST    Result Date: 7/2/2024  1. Evolving left MCA territory infarct involving the left frontal lobe and left insula with multiple foci of associated hemorrhage, not significantly changed compared with CT performed earlier the same day. No new hemorrhage is seen. No midline shift. 2. Evolving small left occipital lobe infarct is better visualized on previous MRI.     CT HEAD WO CONTRAST    Result Date: 7/2/2024  1. Redemonstration of findings of hypodense acute infarction in the vascular territory of the left middle cerebral artery involving the area of the left Sylvian fissure and extended around the left Sylvian fissure area. 2. The amount of cortical-gyriform hemorrhage in the area of the left Sylvian fissure appears to be considerably increased and there was evidence of increased edema around this area of hemorrhage. 3. There is mild mass effect to the lateral aspect of the left lateral ventricle caused by the left MCA territory infarction edema. The left lateral ventricle is not significantly narrowed. There is minimal midline shift from left to right by 3.8 mm similar to previous study on 06/30/2024. 4. No evidence of intraventricular hemorrhage. 5. No evidence of subdural or epidural hematoma.     XR CHEST PORTABLE    Result Date: 7/1/2024  Stable chest when compared to the prior exam     CT HEAD WO CONTRAST    Result Date: 7/1/2024  1.  Evolving left MCA territory infarction with mildly increased foci of hemorrhage in the infarct territory. 2.  Stable 3 mm rightward midline shift. Discussed with NILAY Wolf by Dr. Cowan at 2:17 am on 7/1/2024       ASSESSMENT & PLAN     Principal Problem:    Acute ischemic left MCA stroke with hemorrhagic conversion  Active Problems:    SINTIA 
similar to previous study on 06/30/2024. 4. No evidence of intraventricular hemorrhage. 5. No evidence of subdural or epidural hematoma.     XR CHEST PORTABLE    Result Date: 7/1/2024  Stable chest when compared to the prior exam     CT HEAD WO CONTRAST    Result Date: 7/1/2024  1.  Evolving left MCA territory infarction with mildly increased foci of hemorrhage in the infarct territory. 2.  Stable 3 mm rightward midline shift. Discussed with NILAY Wolf by Dr. Cowan at 2:17 am on 7/1/2024       ASSESSMENT & PLAN     Principal Problem:    Acute ischemic left MCA stroke with hemorrhagic conversion  Active Problems:    SINTIA (acute kidney injury) (HCC)    Encephalopathy    Oropharyngeal dysphagia    HFrEF (heart failure with reduced ejection fraction) (HCC)    Permanent atrial fibrillation (HCC)    Left atrial dilation    Type 2 diabetes mellitus ([facility])  Resolved Problems:    Non-traumatic rhabdomyolysis    Pneumonia due to infectious organism    Shock (HCC)    Hyponatremia    Type II Diabetes Mellitus  Patient's blood glucose ranges from between 190 to 200 mg/dL.  HbA1c was 9.5% on admission.   Lantus 20 units OD  Insulin Lispro 6 hourly according to high dose S/s  Monitor glucose closely.      Hypertension  Maintained on Lopressor     NSTEMI  Patient's Troponins have been in an upward trend in the past 5-7 days.     Parkinson's disease  Patient earlier had parkinson's dementia which may contribute to further worsening of her current mental condition    Atrial fibrillation  Patient had an episode of tachycardia on 7/5 however there was no hemodynamic instability    DVT prophylaxis: compression  PT/OT: Consulted  Case Management: Consulted    Flora Simon MD  PGY-1, Internal Medicine Resident  [facility]         7/5/2024, 6:26 AM   
injury) (HCC)    Encephalopathy    Oropharyngeal dysphagia    HFrEF (heart failure with reduced ejection fraction) (HCC)    Permanent atrial fibrillation (HCC)    Left atrial dilation    Type 2 diabetes mellitus ([facility])  Resolved Problems:    Non-traumatic rhabdomyolysis    Pneumonia due to infectious organism    Shock (HCC)    Hyponatremia    Type II Diabetes Mellitus  Patient's blood glucose ranges from between 190 to 200 mg/dL.  HbA1c was 9.5% on admission.   Lantus 20 units BD  Insulin Lispro 6 hourly according to high dose S/s  Monitor glucose closely.      Hypertension  Maintained on Lopressor 25 mg BD     NSTEMI  Patient's Troponins have been in an upward trend in the past 5-7 days.    UTI  Urinalysis indicated infection. Patient is started on iv Rocephin for the UTI.   Follow counts and renal markers     Parkinson's disease  Patient earlier had parkinson's dementia which may contribute to further worsening of his current mental condition     Atrial fibrillation  Patient had an episode of tachycardia on 7/5 however there was no hemodynamic instability.  7/6 stable AF noted     DVT prophylaxis: compression  PT/OT: Consulted  Case Management: Consulted      Flora Simon MD  PGY-1, Internal Medicine Resident  [facility]         7/6/2024, 9:47 AM   
seen in previous, unchanged from previous  - Monitor off propofol and fentanyl infusions, use as needed fentanyl IV push as needed  - Repeat CT head stable   - TSH, B12, folate, ammonia -unremarkable  - No events on LTME, discontinued   - Goal MAP greater than 65  - Neuro checks per protocol  - Continue Provigil 200mg daily for neurostimulation  - Continue Sinemet  mg three tablets 3 times daily    CARDIOVASCULAR:  Hypotension, shock?  Hypovolemic versus septic  History of atrial fibrillation (on Eliquis)  - Continue Lopressor 25 mg twice daily  - Continue home digoxin  - Goal MAP greater than 65  - Echocardiogram showing EF of 35 to 40%, moderate global hypokinesis present  - Restart heparin  - troponin 57-60-64, recheck  - Continue telemetry    PULMONARY:  Acute hypoxic respiratory failure  Aspiration pneumonia  - Vent Settings: CPAP/PS, RR 15/ PIP 14 /PEEP 5  - Daily ABG: pH 7.45, HCO3 30  - Lasix 20 mg bid       RENAL/FLUID/ELECTROLYTE:    - BUN 20/ Cr 0.7  - IVF: Total fluids of 75 M association  - Sodium 130, recheck BMP  - Replace electrolytes PRN  - Daily BMP    GI/NUTRITION:  NUTRITION:  Diet NPO  ADULT TUBE FEEDING; Nasogastric; Peptide Based; Continuous; 45; No; 95; Q 3 hours; Protein; 1 Dose; TID  - Bowel regimen: GlycoLax as needed  - GI prophylaxis: Pepcid    ID:  Aspiration pneumonia  Shock, ? Severe dehydration vs septic; resolved  Temp (24hrs), Av.4 °F (36.9 °C), Min:98.1 °F (36.7 °C), Max:98.9 °F (37.2 °C)    -WBC 8.7  - Completed course of Unasyn  - 1 out of 2 blood cultures positive for staph epi (), likely contaminant  - Repeat blood and respiratory cultures pending, no growth to date  - MRSA nasal swab was negative   - Respiratory PCR negative  - Continue to monitor for fevers  - Daily CBC    HEME:   Recent Labs     24  0640   HGB 11.6*   HCT 36.5*        - Daily CBC    ENDOCRINE:  Type 2 diabetes mellitus  - Continue high intensity corrective insulin sliding 
which his neurostatus improved he was extubated on 6/30 after which he was noted to have changes in mental status, repeat imaging showed left MCA infarct with hemorrhagic transformation with 3 mm rightward midline shift  Medical comorbidities - t2dm on insulin,afib on digoxin and eliquis,htn,hfref         Acute metabolic encephalopathy   2/2 UTI and parkinsons disease /prolonged hospital stay   Continue antibiotics  Management per neuro   Sinemet increased by primary team  Amantadine added for neuro stimulation     UTI - Gram negative rods   Juárez's had to be placed instead of straight cath  Ceftriaxone started (7/8)  Will be continued on Rocephin till 7/12 as per ID     Acute hypoxic respiratory failure   Likely 2/2 atelectasis/aspiration   Ctpe negative for pe   Wean nasal cannula as tolerated   Was treated for aspiration with zosyn   Chest physiotherapy , continue diuresis      Type II Diabetes Mellitus  Patient's blood glucose ranges from between 190 to 200 mg/dL.  HbA1c was 9.5% on admission.   Lantus 35 units OD  Lispro given according to HD S/s  Poct glucose checks      Hypertension  Maintained on Lopressor 25 mg BD. Also being given Digoxin since he has heart failure     NSTEMI  New drop in EF  Patient had TTE conducted 6/20/2024 -estimated ejection fraction is 35 to 40%.  Moderate global hypokinesis present.  Right atrium moderately dilated.  Left atrium moderately dilated.  Patient does have moderate pulmonary hypertension with RVSP of 55.  Indeterminate diastolic function of left ventricle due to atrial fibrillation  Recent echo in December 2023 showed ejection fraction of 60 to 65%  Continue lasix,beta blocker, hold entresto due to hypotension   Will need cardiac cath when cleared by neuro      Parkinson's disease  Patient earlier had parkinson's dementia which may contribute to further worsening of his current mental condition     Persistent Atrial fibrillation  Chadsvasc 6   Rate controlled   continue 
10.2  - Completed course of Unasyn  - 1 out of 2 blood cultures positive for staph epi (6/20), likely contaminant  - Repeat blood and respiratory cultures pending, no growth to date  - MRSA nasal swab was negative   - Respiratory PCR negative  - Continue to monitor for fevers  - Daily CBC    HEME:   - H&H: 11.5/37.0  - Platelets: 183  - Daily CBC    ENDOCRINE:  Type 2 diabetes mellitus  - Continue high intensity corrective insulin sliding scale  - Continue home Lantus 36 units nightly   - Continue to monitor blood glucose, goal <180  - Change tube feed to diabetic    OTHER:  - PT/OT/ST   - Code Status: Full    PROPHYLAXIS:  Stress ulcer: H2 blocker    DVT PROPHYLAXIS:  - SCD sleeves - Thigh High   - start Heparin tomorrow     DISPOSITION:  [x] To remain ICU:   [] OK for out of ICU from Neuro Critical Care standpoint    We will continue to follow along.     For any changes in exam or patient status please contact Neuro Critical Care.    Critical Care Time: 15 minutes       SANDRA Cifuentes - CNP  Neuro Critical Care  7/1/2024     6:52 AM      
EXAM       CONSTITUTIONAL:  Intubated, sedation held, opens eyes to voice, unable to track or follow commands   HEAD:  normocephalic, atraumatic    EYES:  PERRLA, EOMI.   ENT:  moist mucous membranes   NECK:  supple, symmetric   LUNGS:  Equal air entry bilaterally   CARDIOVASCULAR:  normal s1 / s2, RRR, distal pulses intact   ABDOMEN:  Soft, no rigidity   NEUROLOGIC:  Mental Status: Intubated, sedation held, opens eyes to voice, unable to track or follow commands             Cranial Nerves:    III: Pupils:  equal, round, reactive to light  III,IV,VI: Extra Ocular Movements: intact  V: Corneal reflex:  completed.  intact  Cough and gag intact    Motor Exam:    Localizes to pain left upper extremity  No movement to pain right upper extremity  Withdrawal to pain bilateral lower extremities         ASSESSMENT AND PLAN:       This is a 73 y.o. male with significant past medical history of atrial fibrillation,on Eliquis was found down by EMS with last known well 48 hours prior. CT of brain with left MCA M2 infarct also noted to have rhabdomyolysis and lactic acidosis.     Plan of care discussed with attending physician    NEUROLOGIC:  Subacute left MCA territory infarct  - Continue aspirin  - Continue Lipitor 80 mg nightly for secondary stroke prevention  - MRI brain: Left MCA territory infarct, acute small left occipital infarct  - Monitor off propofol and fentanyl infusions, use as needed fentanyl IV push as needed  - Repeat CT head stable   - Stroke workup including echocardiogram  - Goal MAP greater than 65  - Neuro checks per protocol    CARDIOVASCULAR:  Hypotension, shock?  Hypovolemic versus septic  History of atrial fibrillation (on Eliquis)  - Monitor off vasopressors  - Continue Lopressor 25 mg twice daily  - Restart home digoxin  - Goal MAP greater than 65  - Obtain echocardiogram  - Continue heparin infusion, plan to transition back to Eliquis when appropriate  - Continue telemetry    PULMONARY:  Acute hypoxic 
lobes.   2. Occlusion in the proximal and mid M2 segment of the left MCA.   3. Focal short segment occlusion at the origin of the left vertebral artery.   4. 70% stenosis at the origin of the right vertebral artery.   5. Moderate bilateral pleural effusions.  Airspace opacities in the bilateral   lungs, likely related to mild pulmonary edema versus less likely pneumonia.   6. No fracture or subluxation in the cervical spine.  Degenerative disc   disease, most pronounced at C6-7.         CT CHEST ABDOMEN PELVIS W CONTRAST Additional Contrast? None   Final Result   1. No acute traumatic injury involving the chest, abdomen or pelvis.   2. Small bilateral pleural effusions with lower lobe atelectasis.   3. Patchy bilateral upper lobe infiltrates left greater than right.  This   could represent edema or pneumonia.  Follow-up to resolution is recommended.   4. Cholelithiasis.   5. Diverticulosis.   6. Enlarged prostate gland.   7. No acute osseous abnormality involving the thoracic or lumbar spine.         CT THORACIC SPINE BONY RECONSTRUCTION   Final Result   1. No acute traumatic injury involving the chest, abdomen or pelvis.   2. Small bilateral pleural effusions with lower lobe atelectasis.   3. Patchy bilateral upper lobe infiltrates left greater than right.  This   could represent edema or pneumonia.  Follow-up to resolution is recommended.   4. Cholelithiasis.   5. Diverticulosis.   6. Enlarged prostate gland.   7. No acute osseous abnormality involving the thoracic or lumbar spine.         CT LUMBAR SPINE BONY RECONSTRUCTION   Final Result   1. No acute traumatic injury involving the chest, abdomen or pelvis.   2. Small bilateral pleural effusions with lower lobe atelectasis.   3. Patchy bilateral upper lobe infiltrates left greater than right.  This   could represent edema or pneumonia.  Follow-up to resolution is recommended.   4. Cholelithiasis.   5. Diverticulosis.   6. Enlarged prostate gland.   7. No acute 
CT head stable   - Follow-up TSH, B12, folate, ammonia -unremarkable  - No events on LTME, discontinued 6/26  - Goal MAP greater than 65  - Neuro checks per protocol  - Continue Provigil  - Increased Sinemet to three tablets per day given patient's history of Parkinson's and response to therapy     CARDIOVASCULAR:  Hypotension, shock?  Hypovolemic versus septic  History of atrial fibrillation (on Eliquis)  - Monitor off vasopressors  - Continue Lopressor 25 mg twice daily  - Continue home digoxin  - Check digoxin level tomorrow am   - Goal MAP greater than 65  - Obtain echocardiogram showing EF of 35 to 40%, moderate global hypokinesis present  - Hold heparin infusion for now   - Continue telemetry    PULMONARY:  Acute hypoxic respiratory failure  Aspiration pneumonia  - Vent Settings: 30%/16/8/550  - CPAP as tolerated, eval for extubation tomorrow if mentation continues to improve   - Continue Lasix 20 mg BID   - Completed course of Unasyn     RENAL/FLUID/ELECTROLYTE:  SINTIA, likely prerenal azotemia; improved  Rhabdomyolysis  - BUN 26/ Creatinine 0.8  - Urine output 1071/2215  - CK down trending, recheck today   - IVF: Total fluid goal 75 mL/h  - Replace electrolytes PRN  - Daily BMP    GI/NUTRITION:  NUTRITION:  Diet NPO  ADULT TUBE FEEDING; Nasogastric; Peptide Based; Continuous; 45; No; 95; Q 3 hours; Protein; 1 Dose; TID  - Bowel regimen: GlycoLax as needed  - GI prophylaxis: Pepcid    ID:  Aspiration pneumonia  Shock, ? Severe dehydration vs septic   - Completed course of Unasyn  - 1 out of 2 blood cultures positive for staph epi (6/20), likely contaminant  - Repeat blood and respiratory cultures pending, no growth to date  - MRSA nasal swab pending  - Respiratory PCR negative  - Afebrile   - WBC 10.2  - Continue to monitor for fevers  - Daily CBC    HEME:   - H&H 11.5/36.1  - Platelets 185  - Daily CBC    ENDOCRINE:  Type 2 diabetes mellitus  - Continue high intensity corrective insulin sliding scale  - 
infarct involving the left frontal lobe and left insula with associated sulcal effacement.  There are multiple foci of associated hemorrhage, not significantly changed compared with CT performed earlier the same day.  No new hemorrhage is seen.  No midline shift.  Previously noted evolving small left occipital lobe infarct is better visualized on previous MRI.  There is mild diffuse parenchymal atrophy.  Patchy bilateral periventricular and subcortical white matter hypodensities are nonspecific, but compatible with chronic microvascular ischemic change. ORBITS: The visualized portion of the orbits demonstrate no acute abnormality. SINUSES: The visualized paranasal sinuses demonstrate no acute abnormality. Partial opacification of the bilateral mastoid air cells.  Partially visualized nasoenteric tube in place. SOFT TISSUES/SKULL:  No acute abnormality of the visualized skull or soft tissues.     1. Evolving left MCA territory infarct involving the left frontal lobe and left insula with multiple foci of associated hemorrhage, not significantly changed compared with CT performed earlier the same day. No new hemorrhage is seen. No midline shift. 2. Evolving small left occipital lobe infarct is better visualized on previous MRI.     CT HEAD WO CONTRAST    Result Date: 7/2/2024  EXAMINATION: CT OF THE HEAD WITHOUT CONTRAST  7/2/2024 2:48 am TECHNIQUE: CT of the head was performed without the administration of intravenous contrast. Automated exposure control, iterative reconstruction, and/or weight based adjustment of the mA/kV was utilized to reduce the radiation dose to as low as reasonably achievable. COMPARISON: Noncontrast CT scan of head on 06/27/2024. Noncontrast CT scan of the head on 06/30/2024. HISTORY: ORDERING SYSTEM PROVIDED HISTORY: f/u increase in blood in stroke bed TECHNOLOGIST PROVIDED HISTORY: f/u increase in blood in stroke bed FINDINGS: BRAIN/VENTRICLES: Redemonstration of findings of hypodense acute 
3.8 mm similar to previous study on 06/30/2024. 4. No evidence of intraventricular hemorrhage. 5. No evidence of subdural or epidural hematoma.     XR CHEST PORTABLE    Result Date: 7/1/2024  EXAMINATION: ONE XRAY VIEW OF THE CHEST 7/1/2024 5:15 am COMPARISON: 06/24/2024 HISTORY: ORDERING SYSTEM PROVIDED HISTORY: Intubated TECHNOLOGIST PROVIDED HISTORY: Intubated FINDINGS: Right central catheter has been removed.  Remaining multiple support lines and tubes are stable in positioning. Stable bilateral infiltrates and small bilateral pleural effusions. Underlying pulmonary vascular congestion appears unchanged.  No evidence of pneumothorax. No other significant changes are seen.     Stable chest when compared to the prior exam     CT HEAD WO CONTRAST    Result Date: 7/1/2024  EXAMINATION: CT OF THE HEAD WITHOUT CONTRAST  6/30/2024 11:26 pm TECHNIQUE: CT of the head was performed without the administration of intravenous contrast. Automated exposure control, iterative reconstruction, and/or weight based adjustment of the mA/kV was utilized to reduce the radiation dose to as low as reasonably achievable. COMPARISON: 06/27/2024 CT head and 06/25/2024 MRI brain HISTORY: ORDERING SYSTEM PROVIDED HISTORY: Stability CTH after Anti Xa is therapeutic TECHNOLOGIST PROVIDED HISTORY: Stability CTH after Anti Xa is therapeutic Reason for Exam: stability CTH after Anti XA is therapeutic, acute ishemic left MCA stroke FINDINGS: BRAIN/VENTRICLES: Redemonstrated involving left MCA territory infarct.  Foci of hemorrhage in the infarct territory have mildly increased from prior. Stable 3 mm rightward midline shift.  No new loss of gray-white matter differentiation is identified.  There are nonspecific areas of hypoattenuation within the periventricular and subcortical white matter, which likely represent chronic microvascular ischemic change. There is prominence of the ventricles and sulci due to global parenchymal volume loss. ORBITS: 
AP and 4.2 cm transversely as on 06/30/2024.  Increased hypodense area likely to be due to increased edema surrounding the area of hemorrhage. The infarction edema involves the upper posterior portion of the left insular cortex and subinsular area including the left external capsule and most of the central portion and posterior portion of the left corona radiata. There is mild mass effect to lateral aspect of the left lateral ventricle caused by the left MCA territory infarction edema.  The left lateral ventricle is not significantly narrowed.  There is minimal midline shift from left to right by 3.8 mm similar to previous study on 06/30/2024. No evidence of intraventricular hemorrhage. No evidence of subdural or epidural hematoma. No definable hemorrhage or acute process in the right cerebral hemisphere, bilateral thalami, brainstem or in cerebellum. ORBITS: The visualized portion of the orbits demonstrate no acute abnormality. SINUSES: The visualized paranasal sinuses and mastoid air cells demonstrate no acute abnormality. SOFT TISSUES/SKULL:  No acute abnormality of the visualized skull or soft tissues.     1. Redemonstration of findings of hypodense acute infarction in the vascular territory of the left middle cerebral artery involving the area of the left Sylvian fissure and extended around the left Sylvian fissure area. 2. The amount of cortical-gyriform hemorrhage in the area of the left Sylvian fissure appears to be considerably increased and there was evidence of increased edema around this area of hemorrhage. 3. There is mild mass effect to the lateral aspect of the left lateral ventricle caused by the left MCA territory infarction edema. The left lateral ventricle is not significantly narrowed. There is minimal midline shift from left to right by 3.8 mm similar to previous study on 06/30/2024. 4. No evidence of intraventricular hemorrhage. 5. No evidence of subdural or epidural hematoma.     XR CHEST 
date  - MRSA nasal swab pending  - Respiratory PCR negative  - Afebrile   - WBC 11.2  - Continue to monitor for fevers  - Daily CBC    HEME:   - H&H 12.1/37.0  - Platelets 217  - Daily CBC    ENDOCRINE:  Type 2 diabetes mellitus  - Continue high intensity corrective insulin sliding scale  - Continue home Lantus 36 units nightly   - Continue to monitor blood glucose, goal <180  - Change tube feed to diabetic    OTHER:  - PT/OT/ST   - Code Status: Full    PROPHYLAXIS:  Stress ulcer: H2 blocker    DVT PROPHYLAXIS:  - SCD sleeves - Thigh High   - Heparin (On hold)  - Lovenox   - Aspirin      DISPOSITION:  [x] To remain ICU  [] OK for out of ICU from Neuro Critical Care standpoint    We will continue to follow along.     For any changes in exam or patient status please contact Neuro Critical Care.    Critical care time: 25 minutes       SANDRA Cifuentes - CNP  Neuro Critical Care  6/28/2024     6:35 AM        
edema. The left lateral ventricle is not significantly narrowed. There is minimal midline shift from left to right by 3.8 mm similar to previous study on 06/30/2024. 4. No evidence of intraventricular hemorrhage. 5. No evidence of subdural or epidural hematoma.     XR CHEST PORTABLE    Result Date: 7/1/2024  EXAMINATION: ONE XRAY VIEW OF THE CHEST 7/1/2024 5:15 am COMPARISON: 06/24/2024 HISTORY: ORDERING SYSTEM PROVIDED HISTORY: Intubated TECHNOLOGIST PROVIDED HISTORY: Intubated FINDINGS: Right central catheter has been removed.  Remaining multiple support lines and tubes are stable in positioning. Stable bilateral infiltrates and small bilateral pleural effusions. Underlying pulmonary vascular congestion appears unchanged.  No evidence of pneumothorax. No other significant changes are seen.     Stable chest when compared to the prior exam     CT HEAD WO CONTRAST    Result Date: 7/1/2024  EXAMINATION: CT OF THE HEAD WITHOUT CONTRAST  6/30/2024 11:26 pm TECHNIQUE: CT of the head was performed without the administration of intravenous contrast. Automated exposure control, iterative reconstruction, and/or weight based adjustment of the mA/kV was utilized to reduce the radiation dose to as low as reasonably achievable. COMPARISON: 06/27/2024 CT head and 06/25/2024 MRI brain HISTORY: ORDERING SYSTEM PROVIDED HISTORY: Stability CTH after Anti Xa is therapeutic TECHNOLOGIST PROVIDED HISTORY: Stability CTH after Anti Xa is therapeutic Reason for Exam: stability CTH after Anti XA is therapeutic, acute ishemic left MCA stroke FINDINGS: BRAIN/VENTRICLES: Redemonstrated involving left MCA territory infarct.  Foci of hemorrhage in the infarct territory have mildly increased from prior. Stable 3 mm rightward midline shift.  No new loss of gray-white matter differentiation is identified.  There are nonspecific areas of hypoattenuation within the periventricular and subcortical white matter, which likely represent chronic 
visualized skull or soft tissues.  Partially visualized nasogastric and endotracheal tubes.     1.  Evolving left MCA territory infarction with mildly increased foci of hemorrhage in the infarct territory. 2.  Stable 3 mm rightward midline shift. Discussed with NILAY Wolf by Dr. Cowan at 2:17 am on 7/1/2024       Medical Decision Edifgs[facility]ub[facility]Ebd[facility]:     Results       Procedure Component Value Units Date/Time    Culture, Urine [[id number]]  (Abnormal) Collected: 07/05/24 2255    Order Status: Completed Specimen: Urine, clean catch Updated: 07/06/24 1917     Specimen Description .CLEAN CATCH URINE     Culture GRAM NEGATIVE RODS >100,000 CFU/ML    Culture, Blood 1 [[id number]] Collected: 07/05/24 1417    Order Status: Completed Specimen: Blood Updated: 07/06/24 1445     Specimen Description .BLOOD     Special Requests RIGHT HAND     Culture NO GROWTH 1 DAY    Culture, Blood 1 [[id number]] Collected: 07/05/24 1358    Order Status: Completed Specimen: Blood Updated: 07/06/24 1441     Specimen Description .BLOOD     Special Requests LEFT HAND .5ML     Culture NO GROWTH 1 DAY              Medical Decision Making-Other:     Note:    Thank you for allowing us to participate in the care of this patient. Please call with questions.    Noe Garcia MD  Office: [phone]    
administration of intravenous contrast. Multiplanar reformatted images are provided for review. Automated exposure control, iterative reconstruction, and/or weight based adjustment of the mA/kV was utilized to reduce the radiation dose to as low as reasonably achievable.; CT of the lumbar spine was performed without the administration of intravenous contrast. Multiplanar reformatted images are provided for review.  Adjustment of mA and/or kV according to patient size was utilized.  Automated exposure control, iterative reconstruction, and/or weight based adjustment of the mA/kV was utilized to reduce the radiation dose to as low as reasonably achievable.; CT of the chest, abdomen and pelvis was performed with the administration of intravenous contrast. Multiplanar reformatted images are provided for review. Automated exposure control, iterative reconstruction, and/or weight based adjustment of the mA/kV was utilized to reduce the radiation dose to as low as reasonably achievable. COMPARISON: None. HISTORY: ORDERING SYSTEM PROVIDED HISTORY: stroke, possible fall TECHNOLOGIST PROVIDED HISTORY: stroke, possible fall FINDINGS: CT SCAN CHEST, ABDOMEN AND PELVIS: Mediastinum: There are a few less than 1 cm mediastinal lymph nodes but no lymphadenopathy.  The thoracic aorta is not aneurysmal.  No dissection is seen.  There are no defects involving the major pulmonary arteries.  The heart size is enlarged. Lungs/pleura: The lung parenchyma demonstrates small bilateral pleural effusions with lower lobe atelectasis.  There are patchy bilateral upper lobe infiltrates left greater than right.  There is an endotracheal tube with its tip above the tracy.  No pneumothoraces are seen. Organs: The liver, spleen, pancreas and adrenal glands appear unremarkable. There are gallstones in the gallbladder.  There is symmetric enhancement of the kidneys.  There are small cysts in the right kidney.  No hydronephrosis is seen. GI/Bowel: 
ARTERIES: There is focal short segment occlusion at the origin of the left vertebral artery.  There is 70% stenosis at the origin of the right vertebral artery. SOFT TISSUES: There are moderate bilateral pleural effusions.  There is dependent atelectasis in the bilateral posterior lungs.  There are airspace opacities in the bilateral lungs, likely related to mild pulmonary edema versus less likely pneumonia.  No cervical or superior mediastinal lymphadenopathy.  Limited evaluation of the larynx and pharynx secondary to intubation.  No acute abnormality of the salivary and thyroid glands. CT cervical spine: There is 2 mm C6 on C7 retrolisthesis.  The vertebral body heights are grossly maintained, without fracture or destructive osseous lesion. There is degenerative disc disease with disc space narrowing, endplate changes, endplate osteophyte formation, most severe at C6-7. CTA HEAD: ANTERIOR CIRCULATION: There is occlusion in the proximal and mid M2 segment of the left MCA (series 3, image 210).  No significant stenosis of the intracranial internal carotid, anterior cerebral, or right middle cerebral arteries. No aneurysm. POSTERIOR CIRCULATION: There is fetal origin of the right PCA, normal variant.  No significant stenosis of the vertebral, basilar, or posterior cerebral arteries. No aneurysm. OTHER: No dural venous sinus thrombosis on this non-dedicated study. BRAIN: There is acute to subacute infarction in the left frontal lobe and the left insula cortex, in the left MCA territory.  There is associated local mass effect without midline shift. No extra-axial fluid collection. CT PERFUSION: EXAM QUALITY: The examination is diagnostic with appropriate arterial inflow and venous outflow curves, and diagnostic perfusion maps. CORE INFARCT: The total area of ischemic core is 21 mL (CBF<30% volume). TOTAL HYPOPERFUSION: The total area of hypoperfusion is 52 mL (Tmax>6s volume). PENUMBRA: The penumbra (mismatch) volume 
cerebral arteries. No aneurysm. POSTERIOR CIRCULATION: There is fetal origin of the right PCA, normal variant.  No significant stenosis of the vertebral, basilar, or posterior cerebral arteries. No aneurysm. OTHER: No dural venous sinus thrombosis on this non-dedicated study. BRAIN: There is acute to subacute infarction in the left frontal lobe and the left insula cortex, in the left MCA territory.  There is associated local mass effect without midline shift. No extra-axial fluid collection. CT PERFUSION: EXAM QUALITY: The examination is diagnostic with appropriate arterial inflow and venous outflow curves, and diagnostic perfusion maps. CORE INFARCT: The total area of ischemic core is 21 mL (CBF<30% volume). TOTAL HYPOPERFUSION: The total area of hypoperfusion is 52 mL (Tmax>6s volume). PENUMBRA: The penumbra (mismatch) volume is 31 mL and is located in the left frontal parietal lobes.  The mismatch ratio is 2.5.     1. Acute to subacute infarction in the left MCA territory.  31 mL perfusion mismatch in the left frontal parietal lobes. 2. Occlusion in the proximal and mid M2 segment of the left MCA. 3. Focal short segment occlusion at the origin of the left vertebral artery. 4. 70% stenosis at the origin of the right vertebral artery. 5. Moderate bilateral pleural effusions.  Airspace opacities in the bilateral lungs, likely related to mild pulmonary edema versus less likely pneumonia. 6. No fracture or subluxation in the cervical spine.  Degenerative disc disease, most pronounced at C6-7.     XR CHEST PORTABLE    Result Date: 6/20/2024  EXAMINATION: ONE XRAY VIEW OF THE CHEST 6/20/2024 2:06 pm COMPARISON: None. HISTORY: ORDERING SYSTEM PROVIDED HISTORY: intubated TECHNOLOGIST PROVIDED HISTORY: intubated FINDINGS: ET tube is in good position in midtrachea.  Tip of gastric tube not visualize.  Some elevation of the left hemidiaphragm.  There is suggestion of airspace disease in the the right lung, and the left lung 
candidate for TNK or thrombectomy at the time of presentation due to last known well being 48 hours prior to presentation  - LTME during this admission has showed diffuse slowing of the brain suggestive of encephalopathy with periodic left sharp temporal waves noted  - Seems that patient's mentation did improve somewhat in the neuro ICU when Sinemet was started.  There was concern for possible encephalopathy related to Parkinson's disease.  - CT head on 6/30 showed new hemorrhagic conversion of left-sided MCA territory ischemic stroke.  Patient was on heparin drip at that time which was discontinued.  Repeat CT head on 7/1 showed expansion of left-sided MCA territory hemorrhaging.  Subsequent CTs on 7/2 and 7/3 had shown stability of left-sided hemorrhaging of the brain.  - Patient is continued on Lipitor 80 mg nightly.  -PT/OT/SLP ordered although due to patient's mentation evaluating treatment with these teams is limited  - Patient remains n.p.o. at this time.  Continue tube feeds through NG  - continue to hold aspirin and heparin drip at this time    - Patient will be continued on Sinemet  mg.  Current regimen is 4 tablets in the morning, 3 tablets in the afternoon, and 3 tablets in the evening  - We will continue modafinil at this time for stimulation  - repeat CT head in 4 weeks then start AC      #PEG tube evaluation  -GI consulted for PEG tube placement during this admission due to patient's continued altered mentation and extended use of NG tube.  - lovenox held for PEG tube placement tomorrow      #Shock - resolved. Neurogenic vs septic vs hypovolemic   #HTN   -No longer on pressor support  - Patient no longer on IV fluids  -  currently on Cardura for BPH, but can drop patient's blood pressure  - continue to monitor patient's BP now that GDMT has been started      #acute hypoxic respiratory failure  # Possible aspiration pneumonia   # Small bilateral pleural effusions - resolved  # Likely pulmonary

## 2024-07-10 NOTE — DISCHARGE INSTR - COC
Continuity of Care Form    Patient Name: Timothy Arellano   :  1950  MRN:  [id number]    Admit date:  2024  Discharge date:  7/10/2024    Code Status Order: Full Code   Advance Directives:     Admitting Physician:  Felton Chamberlain MD  PCP: Kevyn Castillo MD    Discharging Nurse: Yudith PARRA  Discharging Hospital Unit/Room#: 0134/0134-01  Discharging Unit Phone Number: [phone]    Emergency Contact:   Extended Emergency Contact Information  Primary Emergency Contact: Ray Arellano  Home Phone: [phone]  Relation: Brother/Sister  Secondary Emergency Contact: Lamine Arellano  Mobile Phone: [phone]  Relation: Brother/Sister    Past Surgical History:  Past Surgical History:   Procedure Laterality Date    GASTROSTOMY TUBE PLACEMENT  2024    ESOPHAGOGASTRODUODENOSCOPY PERCUTANEOUS ENDOSCOPIC GASTROSTOMY TUBE INSERTION    UPPER GASTROINTESTINAL ENDOSCOPY N/A 2024    2ND CASE: ESOPHAGOGASTRODUODENOSCOPY PERCUTANEOUS ENDOSCOPIC GASTROSTOMY TUBE INSERTION performed by Tito Armenta MD at [facility] OR    UPPER GASTROINTESTINAL ENDOSCOPY N/A 2024    ESOPHAGOGASTRODUODENOSCOPY BIOPSY performed by Tito Armenta MD at [facility] OR       Immunization History:   Immunization History   Administered Date(s) Administered    COVID-19, MODERNA BLUE border, Primary or Immunocompromised, (age 12y+), IM, 100 mcg/0.5mL 2021, 2021       Active Problems:  Patient Active Problem List   Diagnosis Code    Acute ischemic left MCA stroke with hemorrhagic conversion I63.512    SINTIA (acute kidney injury) ([facility]) N17.9    Encephalopathy G93.40    Oropharyngeal dysphagia R13.12    HFrEF (heart failure with reduced ejection fraction) ([facility]) I50.20    Permanent atrial fibrillation ([facility]) I48.21    Left atrial dilation I51.7    Type 2 diabetes mellitus ([facility]) E11.9    Complicated UTI (urinary tract infection) N39.0    ACP (advance care planning) Z71.89    Palliative care encounter Z51.5       Isolation/Infection:   Isolation

## 2024-07-11 NOTE — PROCEDURES
EEG was reviewed up to 11:15 PM     The interictal EEG was abnormal due to diffuse delta and theta frequencies with blunt sharp waves with triphasic wave morphology suggestive of severe encephalopathy with cortical irritability.     Electronically signed by Tripp Hernandez MD on 6/24/2024 at 11:17 PM    
EEG was reviewed up to 8:30 PM    The interictal EEG was abnormal due to occasional left hemisphere blunt sharp wave discharges suggestive of cortical irritability in the left hemisphere.  The diffuse  theta frequencies was suggestive of moderate to severe encephalopathy.     Electronically signed by Tripp Hernandez MD on 7/7/2024 at 8:35 PM    
PROCEDURE NOTE  Date: 6/21/2024   Name: Timothy Arellano  YOB: 1950    CENTRAL LINE    Date/Time: 6/21/2024 2:31 AM    Performed by: Roger Bule MD  Authorized by: Roger Blue MD  Consent: The procedure was performed in an emergent situation.  Risks and benefits: risks, benefits and alternatives were discussed  Consent given by: Emergent.  Relevant documents: relevant documents present and verified  Site marked: the operative site was marked  Imaging studies: imaging studies available  Patient identity confirmed: provided demographic data and anonymous protocol, patient vented/unresponsive  Time out: Immediately prior to procedure a \"time out\" was called to verify the correct patient, procedure, equipment, support staff and site/side marked as required.  Indications: vascular access    Sedation:  Patient sedated: yes  Sedatives: fentanyl and propofol    Location details: right internal jugular  Patient position: flat  Catheter type: triple lumen  Pre-procedure: landmarks identified  Ultrasound guidance: yes  Number of attempts: 1  Post-procedure: line sutured and dressing applied  Assessment: placement verified by x-ray, no pneumothorax on x-ray, blood return through all ports and free fluid flow  Patient tolerance: patient tolerated the procedure well with no immediate complications            Roger Blue MD  Internal medicine resident  [facility]  6/21/2024      
PROCEDURE NOTE  Date: 6/21/2024   Name: Timothy Arellano  YOB: 1950    Insert Arterial Line    Date/Time: 6/21/2024 1:24 AM    Performed by: Roger Blue MD  Authorized by: Roger Blue MD  Consent: The procedure was performed in an emergent situation.  Risks and benefits: risks, benefits and alternatives were discussed  Consent given by: Emergent.  Patient states understanding of procedure being performed: Emergent.  Relevant documents: relevant documents present and verified  Test results: test results available and properly labeled  Site marked: the operative site was marked  Patient identity confirmed: anonymous protocol, patient vented/unresponsive  Time out: Immediately prior to procedure a \"time out\" was called to verify the correct patient, procedure, equipment, support staff and site/side marked as required.  Preparation: Patient was prepped and draped in the usual sterile fashion.  Indications: multiple ABGs and hemodynamic monitoring  Location: left femoral    Sedation:  Patient sedated: yes  Sedatives: fentanyl and propofol    Needle gauge: 20  Post-procedure: dressing applied and line sutured        Roger Blue MD  Internal medicine resident  [facility]  6/21/2024          
PROCEDURE NOTE  Date: 6/25/2024   Name: Timothy Arellano  YOB: 1950    Procedures      LONG-TERM EEG-VIDEO MONITORING   CLINICAL NEUROPHYSIOLOGY LABORATORY  DEPARTMENT OF NEUROLOGY  [facility]     Patient: Timothy Arellano  Age: 73 y.o.  MRN: [id number]    Referring Physician: No ref. provider found  History: The patient is a 73 y.o. male who presented breakthrough seizure/encephalopathy. This long-term video-EEG monitoring study was performed to determine the nature of the patient's clinical events. The patient is on neuroactive medications.   Timothy Arellano   Current Facility-Administered Medications   Medication Dose Route Frequency Provider Last Rate Last Admin    digoxin (LANOXIN) tablet 125 mcg  125 mcg Per NG tube Daily Sanya Diaz APRN - CNP        famotidine (PEPCID) tablet 20 mg  20 mg Per NG tube BID Autumn Jack DO   20 mg at 06/24/24 2024    labetalol (NORMODYNE;TRANDATE) injection 10 mg  10 mg IntraVENous Q1H PRN Sanya Diaz APRN - CNP        carbidopa-levodopa (SINEMET)  MG per tablet 1 tablet  1 tablet Per NG tube TID Sanya Diaz APRN - CNP   1 tablet at 06/24/24 2023    insulin glargine (LANTUS) injection vial 30 Units  30 Units SubCUTAneous Nightly Sanya Diaz APRN - CNP   30 Units at 06/24/24 2023    furosemide (LASIX) tablet 20 mg  20 mg Per NG tube BID Sanya Diaz APRN - CNP   20 mg at 06/24/24 1658    atorvastatin (LIPITOR) tablet 80 mg  80 mg Per NG tube Nightly Sanya Diaz APRN - CNP   80 mg at 06/24/24 2023    insulin lispro (HUMALOG,ADMELOG) injection vial 0-16 Units  0-16 Units SubCUTAneous Q6H Sanya Diaz APRN - CNP   4 Units at 06/25/24 0447    metoprolol tartrate (LOPRESSOR) tablet 25 mg  25 mg Per NG tube BID Sanya Diaz APRN - CNP   25 mg at 06/24/24 2023    glucose chewable tablet 16 g  4 tablet Oral PRN Myranda Wolf APRN - CNP        dextrose bolus 10% 125 mL  125 mL IntraVENous PRN Myranda Wolf APRN - CNP        Or    dextrose bolus 10% 250 mL  250 
PROCEDURE NOTE  Date: 7/10/2024   Name: Timothy Arellano  YOB: 1950    Procedures              Referring physician: Dr. Chamberlain  Date: 7/10/2024  Start Time: 7/10/2024 @ 0730  End Time: 7/10/2024 @ 1100    Indication  Patient with encephalopathy, EEG done to rule out subclinical seizures.     Introduction  This continuous video-EEG was acquired using a [employer] workstation at 256 samples/s. Electrodes were placed according to the International 10-20 system. Automated spike and seizure detection algorithms were applied. Video was recorded during this study.    Description  The background consistent of diffuse more reactive polymorphic delta and theta slowing. There is continuous left hemispheric focal slowing or interhemispheric asymmetry was noted. Normal sleep structures were not observed. There were less frequent left temporal blunt phase reversal.     Events  7/8-9/2024  No events reported.     7/9-10/2024  No events reported.      Impression  Abnormal continuous vEEG recording, the slowing mentioned above suggests mild-moderate non specific encephalopathy. Felt over all improved with more reactivity.     Interhemispheric asymmetry suggest focal lesion on the left.    The presence of left temporal sharps increases patient risk for focal seizures. Overall felt less frequent.     Kylah De Paz MD  Epilepsy Board Certified.  Neurology Board Certified.    Electronically Signed    
PROCEDURE NOTE  Date: 7/10/2024   Name: Timothy Arellano  YOB: 1950    Procedures              Referring physician: Dr. Chamberlain  Date: 7/10/2024  Start Time: 7/9/2024 @ 0730  End Time: 7/10/2024 @ 0700    Indication  Patient with encephalopathy, EEG done to rule out subclinical seizures.     Introduction  This continuous video-EEG was acquired using a [employer] workstation at 256 samples/s. Electrodes were placed according to the International 10-20 system. Automated spike and seizure detection algorithms were applied. Video was recorded during this study.    Description  The background consistent of diffuse non reactive polymorphic delta and theta slowing with brief periods of attenuation, by the end of the study reactivity and lack of attenuation noted. There is continuous left hemispheric focal slowing or interhemispheric asymmetry was noted. Normal sleep structures were not observed. There were frequent left temporal blunt phase reversal.     Events  7/8-9/2024  No events reported.     7/9-10/2024  No events reported.      Impression  Abnormal continuous vEEG recording, the slowing mentioned above suggests mild-moderate non specific encephalopathy. No major changes from previous study.     Interhemispheric asymmetry suggest focal lesion on the left.    The presence of left temporal sharps increases patient risk for focal seizures.    Kylah De Paz MD  Epilepsy Board Certified.  Neurology Board Certified.    Electronically Signed    
PROCEDURE NOTE  Date: 7/8/2024   Name: Timothy Arellano  YOB: 1950    Procedures              Referring physician: Dr. Chamberlain  Date: 7/9/2024  Start Time: 7/8/2024 @ 0730  End Time: 7/9/2024 @ 0700    Indication  Patient with encephalopathy, EEG done to rule out subclinical seizures.     Introduction  This continuous video-EEG was acquired using a [employer] workstation at 256 samples/s. Electrodes were placed according to the International 10-20 system. Automated spike and seizure detection algorithms were applied. Video was recorded during this study.    Description  The background consistent of diffuse non reactive polymorphic delta and theta slowing with brief periods of attenuation, by the end of the study reactivity and lack of attenuation noted. There is continuous left hemispheric focal slowing or interhemispheric asymmetry was noted. Normal sleep structures were not observed. There were frequent left temporal blunt phase reversal.     Events  7/8-9/2024  No events reported.       Impression  Abnormal continuous vEEG recording, the slowing mentioned above suggests moderate non specific encephalopathy. However by the end of the study there is some improvement on tracing.     Interhemispheric asymmetry suggest focal lesion on the left.    The presence of left temporal sharps increases patient risk for focal seizures.    Kylah De Paz MD  Epilepsy Board Certified.  Neurology Board Certified.    Electronically Signed    
PROCEDURE NOTE  Date: 7/8/2024   Name: Timothy Arellano  YOB: 1950    Procedures      LONG-TERM EEG-VIDEO MONITORING   CLINICAL NEUROPHYSIOLOGY LABORATORY  DEPARTMENT OF NEUROLOGY  [facility]     Patient: Timothy Arellano  Age: 73 y.o.  MRN: [id number]    Referring Physician: No ref. provider found  History: The patient is a 73 y.o. male who presented breakthrough seizure/encephalopathy. This long-term video-EEG monitoring study was performed to determine the nature of the patient's clinical events. The patient is on neuroactive medications.   Timothy Arellano   Current Facility-Administered Medications   Medication Dose Route Frequency Provider Last Rate Last Admin    insulin glargine (LANTUS) injection vial 25 Units  25 Units SubCUTAneous Daily Emilia Parra MD        albuterol (PROVENTIL) (2.5 MG/3ML) 0.083% nebulizer solution 2.5 mg  2.5 mg Nebulization Q6H PRN Emilia Parra MD        metoprolol tartrate (LOPRESSOR) tablet 37.5 mg  37.5 mg PEG Tube BID Nirali Villavicencio APRN - CNP   37.5 mg at 07/07/24 2040    atorvastatin (LIPITOR) tablet 80 mg  80 mg PEG Tube Nightly Vicky Lindsey MD   80 mg at 07/07/24 2040    polyethylene glycol (GLYCOLAX) packet 17 g  17 g Per G Tube Daily PRN Vicky Lindsey MD        acetaminophen (TYLENOL) tablet 650 mg  650 mg PEG Tube Q6H PRN Vicky Lindsey MD        carbidopa-levodopa (SINEMET)  MG per tablet 1 tablet  1 tablet PEG Tube TID Vicky Lindsey MD   1 tablet at 07/07/24 2040    digoxin (LANOXIN) tablet 125 mcg  125 mcg PEG Tube Daily Vicky Lindsey MD   125 mcg at 07/07/24 0932    doxazosin (CARDURA) tablet 4 mg  4 mg PEG Tube Daily Vicky Lindsey MD   4 mg at 07/07/24 0932    furosemide (LASIX) tablet 20 mg  20 mg Per NG tube Daily Vicky Lindsey MD   20 mg at 07/07/24 0932    modafinil (PROVIGIL) tablet 200 mg  200 mg PEG Tube Daily Vicky Lindsey MD   200 mg at 07/07/24 0931    0.9 % sodium chloride infusion   IntraVENous PRN 
PROCEDURE NOTE  Date: 7/9/2024   Name: Timothy Arellano  YOB: 1950    Lumbar Puncture    Date/Time: 7/9/2024 11:39 AM    Performed by: Marques Norris MD  Authorized by: Marques Norris MD    Consent:     Consent obtained:  Verbal    Consent given by:  Guardian    Risks, benefits, and alternatives were discussed: yes      Risks discussed:  Bleeding, infection, pain, repeat procedure, nerve damage and headache    Alternatives discussed:  No treatment and delayed treatment  Universal protocol:     Procedure explained and questions answered to patient or proxy's satisfaction: yes      Relevant documents present and verified: yes      Test results available: yes      Imaging studies available: yes      Required blood products, implants, devices, and special equipment available: yes      Immediately prior to procedure a time out was called: yes      Site/side marked: yes      Patient identity confirmed:  Hospital-assigned identification number  Pre-procedure details:     Procedure purpose:  Diagnostic  Anesthesia:     Anesthesia method:  Local infiltration    Local anesthetic:  Lidocaine 1% w/o epi  Procedure details:     Lumbar space:  L3-L4 interspace    Patient position:  R lateral decubitus    Needle gauge:  22    Needle type:  Spinal needle - Quincke tip    Needle length (in):  2.5    Ultrasound guidance: no      Number of attempts:  1    Fluid appearance:  Cloudy    Tubes of fluid:  4  Post-procedure details:     Puncture site:  Adhesive bandage applied    Procedure completion:  Tolerated            
15 mL  15 mL Mouth/Throat BID Autumn Jack, DO   15 mL at 06/25/24 2011     Technical Description: This is a 21-channel digital EEG recording with time-locked video. Electrodes were placed in accordance with the 10-20 International System of Electrode Placement. Single lead EKG monitoring was included.    Baseline EEG Recording:  A formal baseline EEG recording was not obtained.     Day - 6/24/24, starting at 10: 38 and reviewed upto 7:30 on 6/25/2020    Interictal EEG Samples:  In the intubated state, the background rhythm was a symmetric, poorly-modulated, 2-5 hz, 20-40 uV rhythm admixed with abundant, 1 Hz, low amplitude, blunt sharp wave discharges frequently they form generalized periodic blunt sharp wave discharges of 1 Hz frequencies.  Sleep structures were not clearly seen.  The EKG channel revealed no abnormalities.    Ictal EEG Recording / Patient Events: During this period the patient had no events or seizures.    Summary: During this day of recording no events were recorded. The interictal EEG was abnormal due to diffuse delta and theta frequencies with blunt sharp waves frequently forming generalized periodic blunt sharp wave discharges suggestive of severe encephalopathy with cortical irritability. Monitoring was continued in order to record the patient's typical events. The EKG channel revealed no abnormalities.    Tripp Hernandez MD  [facility]    6/25/24, reviewed starting 07:30 onwards, interrupted between 20:41-22:43    Interictal EEG Samples: The background was nearly continuous with frequent epochs of diffuse attenuation lasting 2 to 3 seconds, disorganized without an AP gradient, and diffusely slow composed primarily of theta-delta with admixed faster frequencies.  There was no discernible PDR.  There was evidence of reactivity and state changes.  Poorly formed, symmetric sleep architecture was visualized.    There were rare to occasional left temporal low amplitude

## 2024-07-12 LAB
MICROORGANISM SPEC CULT: NORMAL
MICROORGANISM/AGENT SPEC: NORMAL
SPECIMEN DESCRIPTION: NORMAL

## 2024-07-15 ENCOUNTER — TELEPHONE (OUTPATIENT)
Dept: NEUROLOGY | Age: 74
End: 2024-07-15

## 2024-07-15 NOTE — TELEPHONE ENCOUNTER
Cheri with [facility] called in stating when pt was transferred back to them he was taking Keppra 500 mg BID. They are trying to figure out if he is supposed to be getting this medication or not. She was reviewing the hospital notes and could not find if it scheduled or discontinued. She said she saw a notation that it was started.    Upon looking at your notes from the hospital stay (6/20/24-7/10/24) I did not see any mention of Keppra at all.    Can you please advise on this as they have been giving him Keppra at [facility] and need to know if they should continue this.  Thanks

## 2024-07-23 NOTE — TELEPHONE ENCOUNTER
I called to give this information to Cheri, she was not in today. I did speak with Yajaira, one of the other charge nurses. She took this information down and stated that she would give this information to their providers. If they have any questions, they will call down to [id number] to discuss with Dr Woods.

## 2024-08-13 ENCOUNTER — HOSPITAL ENCOUNTER (OUTPATIENT)
Age: 74
Setting detail: SPECIMEN
Discharge: HOME | End: 2024-08-13

## 2024-08-13 LAB
CREAT SERPL-MCNC: 0.8 MG/DL (ref 0.7–1.2)
GFR, ESTIMATED: >90 ML/MIN/1.73M2

## 2024-08-13 PROCEDURE — 82565 ASSAY OF CREATININE: CPT

## 2024-08-13 PROCEDURE — 36415 COLL VENOUS BLD VENIPUNCTURE: CPT

## 2024-08-13 PROCEDURE — P9603 ONE-WAY ALLOW PRORATED MILES: HCPCS

## 2024-08-19 ENCOUNTER — HOSPITAL ENCOUNTER (OUTPATIENT)
Age: 74
Setting detail: SPECIMEN
Discharge: HOME | End: 2024-08-19

## (undated) DEVICE — MIC* SAFETY PERCUTANEOUS ENDOSCOPIC GASTROSTOMY PEG KIT - 20 FR - PULL: Brand: MIC PEG TUBE

## (undated) DEVICE — BINDER ABD UNISX 4 PNL PREM 6INX6INX12IN L XL 4

## (undated) DEVICE — SINGLE-USE BIOPSY FORCEPS: Brand: RADIAL JAW 4